# Patient Record
Sex: MALE | Race: WHITE | NOT HISPANIC OR LATINO | URBAN - METROPOLITAN AREA
[De-identification: names, ages, dates, MRNs, and addresses within clinical notes are randomized per-mention and may not be internally consistent; named-entity substitution may affect disease eponyms.]

---

## 2019-06-26 ENCOUNTER — EMERGENCY (EMERGENCY)
Facility: HOSPITAL | Age: 64
LOS: 1 days | Discharge: HOME | End: 2019-06-26
Attending: EMERGENCY MEDICINE | Admitting: EMERGENCY MEDICINE
Payer: COMMERCIAL

## 2019-06-26 PROCEDURE — 99284 EMERGENCY DEPT VISIT MOD MDM: CPT | Mod: 25

## 2019-06-27 VITALS
HEART RATE: 91 BPM | RESPIRATION RATE: 18 BRPM | SYSTOLIC BLOOD PRESSURE: 160 MMHG | OXYGEN SATURATION: 99 % | DIASTOLIC BLOOD PRESSURE: 91 MMHG | TEMPERATURE: 98 F

## 2019-06-27 VITALS
TEMPERATURE: 98 F | DIASTOLIC BLOOD PRESSURE: 70 MMHG | RESPIRATION RATE: 19 BRPM | SYSTOLIC BLOOD PRESSURE: 137 MMHG | OXYGEN SATURATION: 99 % | HEART RATE: 81 BPM

## 2019-06-27 LAB
ALBUMIN SERPL ELPH-MCNC: 4.1 G/DL — SIGNIFICANT CHANGE UP (ref 3.3–5)
ALP SERPL-CCNC: 58 U/L — SIGNIFICANT CHANGE UP (ref 40–120)
ALT FLD-CCNC: 14 U/L — SIGNIFICANT CHANGE UP (ref 4–41)
ANION GAP SERPL CALC-SCNC: 11 MMO/L — SIGNIFICANT CHANGE UP (ref 7–14)
AST SERPL-CCNC: 22 U/L — SIGNIFICANT CHANGE UP (ref 4–40)
BASOPHILS # BLD AUTO: 0.03 K/UL — SIGNIFICANT CHANGE UP (ref 0–0.2)
BASOPHILS NFR BLD AUTO: 0.4 % — SIGNIFICANT CHANGE UP (ref 0–2)
BILIRUB DIRECT SERPL-MCNC: < 0.2 MG/DL — SIGNIFICANT CHANGE UP (ref 0.1–0.2)
BILIRUB SERPL-MCNC: 0.4 MG/DL — SIGNIFICANT CHANGE UP (ref 0.2–1.2)
BUN SERPL-MCNC: 15 MG/DL — SIGNIFICANT CHANGE UP (ref 7–23)
CALCIUM SERPL-MCNC: 9.4 MG/DL — SIGNIFICANT CHANGE UP (ref 8.4–10.5)
CHLORIDE SERPL-SCNC: 98 MMOL/L — SIGNIFICANT CHANGE UP (ref 98–107)
CO2 SERPL-SCNC: 28 MMOL/L — SIGNIFICANT CHANGE UP (ref 22–31)
CREAT SERPL-MCNC: 0.9 MG/DL — SIGNIFICANT CHANGE UP (ref 0.5–1.3)
CRP SERPL-MCNC: 11.5 MG/L — HIGH
EOSINOPHIL # BLD AUTO: 0.4 K/UL — SIGNIFICANT CHANGE UP (ref 0–0.5)
EOSINOPHIL NFR BLD AUTO: 4.8 % — SIGNIFICANT CHANGE UP (ref 0–6)
ERYTHROCYTE [SEDIMENTATION RATE] IN BLOOD: 23 MM/HR — HIGH (ref 1–15)
GLUCOSE SERPL-MCNC: 119 MG/DL — HIGH (ref 70–99)
GRAM STN SPEC: SIGNIFICANT CHANGE UP
HBA1C BLD-MCNC: 5.5 % — SIGNIFICANT CHANGE UP (ref 4–5.6)
HCT VFR BLD CALC: 42 % — SIGNIFICANT CHANGE UP (ref 39–50)
HGB BLD-MCNC: 13.6 G/DL — SIGNIFICANT CHANGE UP (ref 13–17)
IMM GRANULOCYTES NFR BLD AUTO: 0.4 % — SIGNIFICANT CHANGE UP (ref 0–1.5)
LYMPHOCYTES # BLD AUTO: 1.62 K/UL — SIGNIFICANT CHANGE UP (ref 1–3.3)
LYMPHOCYTES # BLD AUTO: 19.2 % — SIGNIFICANT CHANGE UP (ref 13–44)
MAGNESIUM SERPL-MCNC: 2.2 MG/DL — SIGNIFICANT CHANGE UP (ref 1.6–2.6)
MCHC RBC-ENTMCNC: 30.4 PG — SIGNIFICANT CHANGE UP (ref 27–34)
MCHC RBC-ENTMCNC: 32.4 % — SIGNIFICANT CHANGE UP (ref 32–36)
MCV RBC AUTO: 93.8 FL — SIGNIFICANT CHANGE UP (ref 80–100)
MONOCYTES # BLD AUTO: 0.74 K/UL — SIGNIFICANT CHANGE UP (ref 0–0.9)
MONOCYTES NFR BLD AUTO: 8.8 % — SIGNIFICANT CHANGE UP (ref 2–14)
NEUTROPHILS # BLD AUTO: 5.6 K/UL — SIGNIFICANT CHANGE UP (ref 1.8–7.4)
NEUTROPHILS NFR BLD AUTO: 66.4 % — SIGNIFICANT CHANGE UP (ref 43–77)
NRBC # FLD: 0 K/UL — SIGNIFICANT CHANGE UP (ref 0–0)
NT-PROBNP SERPL-SCNC: 82.25 PG/ML — SIGNIFICANT CHANGE UP
PHOSPHATE SERPL-MCNC: 3.1 MG/DL — SIGNIFICANT CHANGE UP (ref 2.5–4.5)
PLATELET # BLD AUTO: 192 K/UL — SIGNIFICANT CHANGE UP (ref 150–400)
PMV BLD: 10.4 FL — SIGNIFICANT CHANGE UP (ref 7–13)
POTASSIUM SERPL-MCNC: 3.9 MMOL/L — SIGNIFICANT CHANGE UP (ref 3.5–5.3)
POTASSIUM SERPL-SCNC: 3.9 MMOL/L — SIGNIFICANT CHANGE UP (ref 3.5–5.3)
PROT SERPL-MCNC: 7.5 G/DL — SIGNIFICANT CHANGE UP (ref 6–8.3)
RBC # BLD: 4.48 M/UL — SIGNIFICANT CHANGE UP (ref 4.2–5.8)
RBC # FLD: 12.4 % — SIGNIFICANT CHANGE UP (ref 10.3–14.5)
SODIUM SERPL-SCNC: 137 MMOL/L — SIGNIFICANT CHANGE UP (ref 135–145)
SPECIMEN SOURCE: SIGNIFICANT CHANGE UP
WBC # BLD: 8.42 K/UL — SIGNIFICANT CHANGE UP (ref 3.8–10.5)
WBC # FLD AUTO: 8.42 K/UL — SIGNIFICANT CHANGE UP (ref 3.8–10.5)

## 2019-06-27 PROCEDURE — 73630 X-RAY EXAM OF FOOT: CPT | Mod: 26,LT

## 2019-06-27 PROCEDURE — 71045 X-RAY EXAM CHEST 1 VIEW: CPT | Mod: 26

## 2019-06-27 PROCEDURE — 93971 EXTREMITY STUDY: CPT | Mod: 26,LT

## 2019-06-27 RX ORDER — MUPIROCIN 20 MG/G
1 OINTMENT TOPICAL ONCE
Refills: 0 | Status: COMPLETED | OUTPATIENT
Start: 2019-06-27 | End: 2019-06-27

## 2019-06-27 RX ADMIN — Medication 100 MILLIGRAM(S): at 03:12

## 2019-06-27 RX ADMIN — MUPIROCIN 1 APPLICATION(S): 20 OINTMENT TOPICAL at 11:00

## 2019-06-27 NOTE — ED PROVIDER NOTE - PROGRESS NOTE DETAILS
Elbert Cobb MD, PGY3: Patient received at resident sign out. Discussed with podiatry, will discharge with follow up in 1 week. Pt ambulating, appears well. Patient informed of ED visit findings, understands plan.  Patient provided with written and further verbal instructions not included in discharge paperwork.  Patient instructed to follow up with their primary care physician in 2-3 days and return for new, worsened, or persistent symptoms.

## 2019-06-27 NOTE — CONSULT NOTE ADULT - SUBJECTIVE AND OBJECTIVE BOX
Podiatry pager #: [phone] ([place])/ [phone] ([facility])    Patient is a 64y old  Male who presents with a chief complaint of L foot ulcer w/ LLE swelling.    HPI: 63yo M who has not seen a doctor >20 years presents with L leg pain and swelling. 2 weeks ago pt filed down a callous on the bottom of his L foot. Ex-girlfriend Adrianne at bedside. An ulcer developed and has gotten larger. He tried peroxide and salt water on the wound. His LLE became red, swollen, and warm. He has chronic b/l LE edema especially after he stands all day as a , but the L leg is more swollen. Denies discharge from the ulcer. He has a chronic cough with white phlegm for years. At baseline he does not exert himself much and feels short of breath easily. Sleeps with 2 pillows. Wakes up in the middle of the night coughing sometimes. Denies F/C, CP, wheezing, N/V/D, abd pain, PND, orthopnea, dysuria, numbness/tingling, urinary frequency, polydipsia.      PAST MEDICAL & SURGICAL HISTORY:  No pertinent past medical history  No significant past surgical history      MEDICATIONS  (STANDING):    MEDICATIONS  (PRN):      Allergies    No Known Allergies    Intolerances        VITALS:    Vital Signs Last 24 Hrs  T(C): 36.7 (27 Jun 2019 07:51), Max: 36.7 (27 Jun 2019 00:01)  T(F): 98 (27 Jun 2019 07:51), Max: 98.1 (27 Jun 2019 00:01)  HR: 81 (27 Jun 2019 07:51) (65 - 91)  BP: 137/70 (27 Jun 2019 07:51) (137/70 - 162/94)  BP(mean): --  RR: 19 (27 Jun 2019 07:51) (18 - 19)  SpO2: 99% (27 Jun 2019 07:51) (98% - 99%)    LABS:                          13.6   8.42  )-----------( 192      ( 27 Jun 2019 02:40 )             42.0       06-27    137  |  98  |  15  ----------------------------<  119<H>  3.9   |  28  |  0.90    Ca    9.4      27 Jun 2019 02:40  Phos  3.1     06-27  Mg     2.2     06-27    TPro  7.5  /  Alb  4.1  /  TBili  0.4  /  DBili  < 0.2  /  AST  22  /  ALT  14  /  AlkPhos  58  06-27      CAPILLARY BLOOD GLUCOSE      POCT Blood Glucose.: 147 mg/dL (27 Jun 2019 00:00)          LOWER EXTREMITY PHYSICAL EXAM:    Vascular: DP/PT 2/4 B/L, CFT <3 seconds B/L, Temperature gradient warm to cool B/L  Neuro: Epicritic sensation diminished to the level of toes B/L  Musculoskeletal/Ortho: cavovarus foot type b/l  Skin: LLE +3 pitting edema w/ venous stasis and varicosities b/l  Wound #1:   Location: left foot sub met 5  Size: 2x2cm in diameter  Depth: probes to subQ  Wound bed: fibrogranular  Drainage: none  Odor: none  Periwound: hyperkeratotic  Etiology: pressure, neuropathy    RADIOLOGY & ADDITIONAL STUDIES:  < from: Xray Foot AP + Lateral + Oblique, Left (06.27.19 @ 04:58) >  EXAM:  RAD FOOT MIN 3 VIEWS LT        PROCEDURE DATE:  Jun 27 2019         INTERPRETATION:  CLINICAL INFORMATION: Left foot ulceration, evaluate for   acute osteomyelitis.    TECHNIQUE: 3 views of the left foot.    COMPARISON: None.    FINDINGS:   Diffuse lower extremity subcutaneous edema.  No evidence of subcutaneous tracking gas, cortical destruction or   erosion. No radiopaque foreign body.  Chronic appearing deformity of the second digit metatarsal head.  No acute fracture or dislocation.    IMPRESSION:   No radiologic evidence of acute osteomyelitis.              RACHEL MALLOY M.D., RADIOLOGY RESIDENT  This document has been electronically signed.  MANI PARSONS M.D., ATTENDING RADIOLOGIST  This document has been electronically signed. Jun27 2019  6:16AM                  < end of copied text >

## 2019-06-27 NOTE — ED PROVIDER NOTE - NSFOLLOWUPINSTRUCTIONS_ED_ALL_ED_FT
1. TAKE CLINDAMYCIN (ANTIBIOTIC) AS PRESCRIBED  2. FOR PAIN OR FEVER YOU CAN TAKE IBUPROFEN (MOTRIN, ADVIL) OR ACETAMINOPHEN (TYLENOL) AS NEEDED, AS DIRECTED ON PACKAGING.  3. FOLLOW UP WITH A PODIATRIST IN 5-7 DAYS. CALL OFFICE FOR APPOINTMENT.  4. IF YOU HAD LABS OR IMAGING DONE, YOU WERE GIVEN COPIES OF ALL LABS AND/OR IMAGING RESULTS FROM YOUR ER VISIT--PLEASE TAKE THEM WITH YOU TO YOUR FOLLOW UP APPOINTMENTS.  5. IF NEEDED, CALL PATIENT ACCESS SERVICES AT [phone] TO FIND A PRIMARY CARE PHYSICIAN.  OR CALL [phone] TO MAKE AN APPOINTMENT WITH THE CLINIC.  6. RETURN TO THE ER FOR ANY WORSENING SYMPTOMS OR CONCERNS.  7. PLEASE ESTABLISH CARE WITH A PRIMARY CARE DOCTOR FOR HEALTH MAINTENANCE AND ROUTINE HEALTHCARE. 1. TAKE CLINDAMYCIN (ANTIBIOTIC) AS PRESCRIBED TWO PILLS EVERY 8 HOURS FOR 10 DAYS  2. FOR PAIN OR FEVER YOU CAN TAKE IBUPROFEN (MOTRIN, ADVIL) OR ACETAMINOPHEN (TYLENOL) AS NEEDED, AS DIRECTED ON PACKAGING.  3. FOLLOW UP WITH A PODIATRIST IN 5-7 DAYS. CALL OFFICE FOR APPOINTMENT.  -Follow up with Dr. Richards within 1 week. Call [phone] to make an appointment.  4. IF YOU HAD LABS OR IMAGING DONE, YOU WERE GIVEN COPIES OF ALL LABS AND/OR IMAGING RESULTS FROM YOUR ER VISIT--PLEASE TAKE THEM WITH YOU TO YOUR FOLLOW UP APPOINTMENTS.  5. IF NEEDED, CALL PATIENT ACCESS SERVICES AT [phone] TO FIND A PRIMARY CARE PHYSICIAN.  OR CALL [phone] TO MAKE AN APPOINTMENT WITH THE CLINIC.  6. RETURN TO THE ER FOR ANY WORSENING SYMPTOMS OR CONCERNS.  7. PLEASE ESTABLISH CARE WITH A PRIMARY CARE DOCTOR FOR HEALTH MAINTENANCE AND ROUTINE HEALTHCARE.

## 2019-06-27 NOTE — ED ADULT NURSE REASSESSMENT NOTE - NS ED NURSE REASSESS COMMENT FT1
Received report from overnight YANELI [place]. Pt AxOx4, presenting with positive ROM in upper and lower extremities. Breathing is even and unlabored, pt satting well on RA. NAD noted at this time. Pt denies CP, SOB, dizziness, lightheadedness, H/A at this time. Pt waiting podiatry consult. Wound on bottom of left foot measuring 3x2, stage 2. MD at bedside, S, will continue to monitor.

## 2019-06-27 NOTE — ED ADULT NURSE REASSESSMENT NOTE - NS ED NURSE REASSESS COMMENT FT1
Pt requesting to speak with doctor to discuss results, MD made aware, MD at bedside, will continue to monitor.

## 2019-06-27 NOTE — ED ADULT NURSE NOTE - NSIMPLEMENTINTERV_GEN_ALL_ED
Implemented All Universal Safety Interventions:  [place] to call system. Call bell, personal items and telephone within reach. Instruct patient to call for assistance. Room bathroom lighting operational. Non-slip footwear when patient is off stretcher. Physically safe environment: no spills, clutter or unnecessary equipment. Stretcher in lowest position, wheels locked, appropriate side rails in place.

## 2019-06-27 NOTE — ED PROVIDER NOTE - CCCP TRG CHIEF CMPLNT
Veronica from [facility] calling and patient has a sentencing at 3:20 today.    He was been rude, combative with staff and likely off his medications and noncompliant.     He will likely be sentenced to [facility] today.    They will be contacting our office to notify us if new scripts need to be sent to a new pharmacy if he has to go to [facility].    Veronica's supervisor is Dante and his phone number is [phone]       foot/leg pain/swelling

## 2019-06-27 NOTE — ED ADULT TRIAGE NOTE - CHIEF COMPLAINT QUOTE
c/o pain and swelling to left lower leg. has wound on bottom of foot that started as an infection from sanding down a callus. +1 pitting edema to left leg. has not seen a doctor in 20 years.

## 2019-06-27 NOTE — ED PROVIDER NOTE - ATTENDING CONTRIBUTION TO CARE
Afebrile. Awake and Alert. Lungs CTA. Heart RRR. CN II-XII grossly intact. Moves all extremities without lateralization. b/l LE +2 pitting edema. LLE ulcer to heal of foot with 2.5 cm with surrounding, +2 pedal pulse.    Cellulitis with b/l pedal edema, no medical care  r/o OM (low suspicion) XR, ESR, CRP, blood Cx  r/o CHF given leg swelling BNP/CXR  No sepsis Afebrile. Awake and Alert. Lungs CTA. Heart RRR. CN II-XII grossly intact. Moves all extremities without lateralization. b/l LE +2 pitting edema. LLE ulcer stage 2 to ball of foot with 2.5 cm with surrounding erythema up to mid shin, +2 pedal pulse.    Cellulitis with b/l pedal edema, no medical care  r/o OM (low suspicion) XR, ESR, CRP, blood Cx  r/o CHF given leg swelling BNP/CXR  No sepsis Afebrile. Awake and Alert. Lungs CTA. Heart RRR. CN II-XII grossly intact. Moves all extremities without lateralization. b/l LE l>r. LLE ulcer stage 2 to ball of foot with 2.5 cm with surrounding erythema up to mid shin, +2 pedal pulse.    Cellulitis with b/l pedal edema, no medical care  r/o OM (low suspicion) XR, ESR, CRP, blood Cx  r/o CHF given leg swelling BNP/CXR  No sepsis  Will check HA1C r/o DM    Venous stasis ulcer. Pt declined CDU for IV abx. Podiatry consulted.

## 2019-06-27 NOTE — ED ADULT NURSE NOTE - OBJECTIVE STATEMENT
Pt received to the ED came in c/o of callous located on bottom of left foot that "broke open". Pt b/l legs edematous at baseline, +1 edema to rt lower leg. Pt left lower leg appears swollen and red. Pt a&ox4 and ambulatory at baseline, respirations even and unlabored. Pt denies fever, chills, or any other symptoms. Awaiting MD orders, will continue to monitor.

## 2019-06-27 NOTE — ED PROVIDER NOTE - OBJECTIVE STATEMENT
63yo M who has not seen a doctor >20 years presents with L leg pain and swelling. 63yo M who has not seen a doctor >20 years presents with L leg pain and swelling. 2 weeks ago pt filed down a callous on the bottom of his L foot. Ex-girlfriend Adrianne at bedside. An ulcer developed and has gotten larger. He tried peroxide and salt water on the wound. His LLE became red, swollen, and warm. He has chronic b/l LE edema especially after he stands all day as a , but the L leg is more swollen. Denies discharge from the ulcer. He has a chronic cough with white phlegm for years. At baseline he does not exert himself much and feels short of breath easily. Sleeps with 2 pillows. Wakes up in the middle of the night coughing sometimes. Denies F/C, CP, wheezing, N/V/D, abd pain, PND, orthopnea, dysuria, numbness/tingling, urinary frequency, polydipsia.

## 2019-06-27 NOTE — ED PROVIDER NOTE - PHYSICAL EXAMINATION
Vital Signs Last 24 Hrs  T(C): 36.6 (27 Jun 2019 01:23), Max: 36.7 (27 Jun 2019 00:01)  T(F): 97.8 (27 Jun 2019 01:23), Max: 98.1 (27 Jun 2019 00:01)  HR: 65 (27 Jun 2019 02:43) (65 - 91)  BP: 142/78 (27 Jun 2019 02:43) (142/78 - 162/94)  BP(mean): --  RR: 18 (27 Jun 2019 02:43) (18 - 18)  SpO2: 98% (27 Jun 2019 02:43) (98% - 99%)    GENERAL: No acute distress, well-developed  HEAD:  Atraumatic, Normocephalic  ENT: PERRL, conjunctiva and sclera clear, neck supple, no JVD, moist mucosa, posterior oropharynx clear  CHEST/LUNG: crackles at bases; No wheeze, equal breath sounds bilaterally, respirations nonlabored, on RA  HEART: Regular rate and rhythm; No murmurs, rubs, or gallops  ABDOMEN: Soft, nontender, nondistended; Bowel sounds present, no organomegaly  BACK: no spinal tenderness, no CVA tenderness  EXTREMITIES:  No clubbing, cyanosis. 3+ pitting edema LLE, 2+ pitting edema RLE with varicose veins. L leg erythematous with inc warmth, no sharp demarcation or streaking. Pedal pulses intact  PSYCH: Nl behavior, nl affect  NEUROLOGY: AAOx3, non-focal, moves all extremities spontaneously  SKIN: Normal color, No rashes. L plantar forefoot clean-based ulcer ~2.5cm diameter, no discharge or foul-smelling.

## 2019-06-27 NOTE — CONSULT NOTE ADULT - ASSESSMENT
65 y/o M w/ LF sub met 5 ulcer to subQ and LLE edema  -Pt seen and evaluated in ED  -Vitals stable, no WBC  -Ulcer probes to subQ, stable, no acute signs of infection  -LLE edema likely 2/2 venous stasis and possibly infection from the ulcer  -X-ray negative for OM  -Wound culture taken  -Pt refusing stay in CDU- will follow up on culture and monitor swelling outpatient  -Dressed w/ betadine and DSD  -Rec 2 wks PO Clinda  -Daily dressing changes w/ mupirocin and DSD  -Follow up with Dr. Richards within 1 week. Call [phone] to make an appointment.  -Discussed w/ attending 65 y/o M w/ LF sub met 5 ulcer to subQ and LLE edema  -Pt seen and evaluated in ED  -Vitals stable, no WBC  -Ulcer probes to subQ, stable, no acute signs of infection  -LLE edema likely 2/2 venous stasis and possibly infection from the ulcer  -X-ray negative for OM  -Wound culture taken  -Pt refusing stay in CDU- will follow up on culture and monitor swelling outpatient  -Dressed w/ betadine, DSD, and ACE  -Rec 2 wks PO Clinda  -Daily dressing changes w/ mupirocin and DSD  -Rec compression of the LLE w/ ACE  -Follow up with Dr. Richards within 1 week. Call [phone] to make an appointment.  -Discussed w/ attending

## 2019-06-27 NOTE — ED PROVIDER NOTE - CARE PLAN
Principal Discharge DX:	Cellulitis of left lower extremity Principal Discharge DX:	Venous stasis ulcer  Secondary Diagnosis:	Cellulitis of left lower extremity

## 2019-06-27 NOTE — ED PROVIDER NOTE - CLINICAL SUMMARY MEDICAL DECISION MAKING FREE TEXT BOX
Pt nontoxic, afebrile without leukocytosis. LLE neg for DVT, XRay LLE neg for signs of OM. ESR 23, CRP 11.5. Clindamycin for LLE cellulitis. Podiatry consult. Pt nontoxic, afebrile without leukocytosis. LLE neg for DVT, XRay LLE neg for signs of OM. ESR 23, CRP 11.5. Clindamycin for LLE cellulitis. Podiatry consult for LLE venous stasis ulcer.

## 2019-06-28 LAB
SPECIMEN SOURCE: SIGNIFICANT CHANGE UP
SPECIMEN SOURCE: SIGNIFICANT CHANGE UP

## 2019-06-29 LAB
GRAM STN SPEC: SIGNIFICANT CHANGE UP
METHOD TYPE: SIGNIFICANT CHANGE UP
ORGANISM # SPEC MICROSCOPIC CNT: SIGNIFICANT CHANGE UP

## 2019-07-01 LAB
-  AMPICILLIN: SIGNIFICANT CHANGE UP
-  CIPROFLOXACIN: SIGNIFICANT CHANGE UP
-  TETRACYCLINE: SIGNIFICANT CHANGE UP
-  VANCOMYCIN: SIGNIFICANT CHANGE UP
CULTURE RESULTS: SIGNIFICANT CHANGE UP
ORGANISM # SPEC MICROSCOPIC CNT: SIGNIFICANT CHANGE UP
ORGANISM # SPEC MICROSCOPIC CNT: SIGNIFICANT CHANGE UP

## 2019-07-02 LAB
BACTERIA BLD CULT: SIGNIFICANT CHANGE UP
BACTERIA BLD CULT: SIGNIFICANT CHANGE UP

## 2019-07-03 RX ORDER — MOXIFLOXACIN HYDROCHLORIDE TABLETS, 400 MG 400 MG/1
1 TABLET, FILM COATED ORAL
Qty: 14 | Refills: 0
Start: 2019-07-03 | End: 2019-07-09

## 2019-09-19 ENCOUNTER — INPATIENT (INPATIENT)
Facility: HOSPITAL | Age: 64
LOS: 4 days | Discharge: HOME | End: 2019-09-24
Attending: INTERNAL MEDICINE | Admitting: INTERNAL MEDICINE
Payer: COMMERCIAL

## 2019-09-19 VITALS
HEART RATE: 84 BPM | RESPIRATION RATE: 16 BRPM | DIASTOLIC BLOOD PRESSURE: 75 MMHG | TEMPERATURE: 98 F | OXYGEN SATURATION: 100 % | SYSTOLIC BLOOD PRESSURE: 156 MMHG

## 2019-09-19 LAB
ALBUMIN SERPL ELPH-MCNC: 4.3 G/DL — SIGNIFICANT CHANGE UP (ref 3.3–5)
ALP SERPL-CCNC: 62 U/L — SIGNIFICANT CHANGE UP (ref 40–120)
ALT FLD-CCNC: 15 U/L — SIGNIFICANT CHANGE UP (ref 4–41)
ANION GAP SERPL CALC-SCNC: 11 MMO/L — SIGNIFICANT CHANGE UP (ref 7–14)
AST SERPL-CCNC: 19 U/L — SIGNIFICANT CHANGE UP (ref 4–40)
BASOPHILS # BLD AUTO: 0.05 K/UL — SIGNIFICANT CHANGE UP (ref 0–0.2)
BASOPHILS NFR BLD AUTO: 0.6 % — SIGNIFICANT CHANGE UP (ref 0–2)
BILIRUB SERPL-MCNC: 0.6 MG/DL — SIGNIFICANT CHANGE UP (ref 0.2–1.2)
BUN SERPL-MCNC: 14 MG/DL — SIGNIFICANT CHANGE UP (ref 7–23)
CALCIUM SERPL-MCNC: 9.3 MG/DL — SIGNIFICANT CHANGE UP (ref 8.4–10.5)
CHLORIDE SERPL-SCNC: 97 MMOL/L — LOW (ref 98–107)
CO2 SERPL-SCNC: 31 MMOL/L — SIGNIFICANT CHANGE UP (ref 22–31)
CREAT SERPL-MCNC: 0.89 MG/DL — SIGNIFICANT CHANGE UP (ref 0.5–1.3)
CRP SERPL-MCNC: 10.2 MG/L — HIGH
EOSINOPHIL # BLD AUTO: 0.27 K/UL — SIGNIFICANT CHANGE UP (ref 0–0.5)
EOSINOPHIL NFR BLD AUTO: 3.1 % — SIGNIFICANT CHANGE UP (ref 0–6)
ERYTHROCYTE [SEDIMENTATION RATE] IN BLOOD: 18 MM/HR — HIGH (ref 1–15)
GLUCOSE SERPL-MCNC: 107 MG/DL — HIGH (ref 70–99)
HCT VFR BLD CALC: 42.6 % — SIGNIFICANT CHANGE UP (ref 39–50)
HGB BLD-MCNC: 13.6 G/DL — SIGNIFICANT CHANGE UP (ref 13–17)
IMM GRANULOCYTES NFR BLD AUTO: 0.6 % — SIGNIFICANT CHANGE UP (ref 0–1.5)
LYMPHOCYTES # BLD AUTO: 2.04 K/UL — SIGNIFICANT CHANGE UP (ref 1–3.3)
LYMPHOCYTES # BLD AUTO: 23.6 % — SIGNIFICANT CHANGE UP (ref 13–44)
MCHC RBC-ENTMCNC: 29.5 PG — SIGNIFICANT CHANGE UP (ref 27–34)
MCHC RBC-ENTMCNC: 31.9 % — LOW (ref 32–36)
MCV RBC AUTO: 92.4 FL — SIGNIFICANT CHANGE UP (ref 80–100)
MONOCYTES # BLD AUTO: 0.63 K/UL — SIGNIFICANT CHANGE UP (ref 0–0.9)
MONOCYTES NFR BLD AUTO: 7.3 % — SIGNIFICANT CHANGE UP (ref 2–14)
NEUTROPHILS # BLD AUTO: 5.6 K/UL — SIGNIFICANT CHANGE UP (ref 1.8–7.4)
NEUTROPHILS NFR BLD AUTO: 64.8 % — SIGNIFICANT CHANGE UP (ref 43–77)
NRBC # FLD: 0 K/UL — SIGNIFICANT CHANGE UP (ref 0–0)
PLATELET # BLD AUTO: 209 K/UL — SIGNIFICANT CHANGE UP (ref 150–400)
PMV BLD: 10.3 FL — SIGNIFICANT CHANGE UP (ref 7–13)
POTASSIUM SERPL-MCNC: 4.2 MMOL/L — SIGNIFICANT CHANGE UP (ref 3.5–5.3)
POTASSIUM SERPL-SCNC: 4.2 MMOL/L — SIGNIFICANT CHANGE UP (ref 3.5–5.3)
PROT SERPL-MCNC: 8.1 G/DL — SIGNIFICANT CHANGE UP (ref 6–8.3)
RBC # BLD: 4.61 M/UL — SIGNIFICANT CHANGE UP (ref 4.2–5.8)
RBC # FLD: 12.4 % — SIGNIFICANT CHANGE UP (ref 10.3–14.5)
SODIUM SERPL-SCNC: 139 MMOL/L — SIGNIFICANT CHANGE UP (ref 135–145)
WBC # BLD: 8.64 K/UL — SIGNIFICANT CHANGE UP (ref 3.8–10.5)
WBC # FLD AUTO: 8.64 K/UL — SIGNIFICANT CHANGE UP (ref 3.8–10.5)

## 2019-09-19 PROCEDURE — 73620 X-RAY EXAM OF FOOT: CPT | Mod: 26,LT

## 2019-09-19 NOTE — ED PROVIDER NOTE - CLINICAL SUMMARY MEDICAL DECISION MAKING FREE TEXT BOX
Attending MD Nelson.  Given age of wound, location, chronic non-healing pressure ulcer with surrounding edema, concern for osteomyelitis.  No current evidence of sepsis or significant surrounding cellulitis.

## 2019-09-19 NOTE — ED ADULT NURSE NOTE - OBJECTIVE STATEMENT
received pt to intake aox4 in no apparent distress VSS sent by podiatrist for wound on L foot. Pt sustained wound to bottom of foot few months ago when filing calculous. Has been slow healing since largest since quarter size now dime size with clean edges and pink wound bed. Pt states was sent for abx because surround area has been intermittently swollen with redness, currently note some edema and redness to middle toe. Pt endorses difficulty ambulating. Denies any other symptoms including fevers, chills, n/v, chest pain, SOB, abdominal pain, weakness. Breaths equal and unlabored VSS 18 G IV L AC placed labs sent will continue to monitor

## 2019-09-19 NOTE — ED PROVIDER NOTE - PROGRESS NOTE DETAILS
Attending MD Nelson.  Labs, vital signs and initial XR largely non-actionable.  Podiatry has seen pt and recommends vanc/cefepime and MRI with contrast.  Pt is well appearing in NAD.  Unclear if chronic non-healing ulcer represents isolated skin involvement or underlying osteomyelitis.  Will consult CDU for possible obs stay and MRI.  PT advised and amenable to plan.  Podiatry to follow.  If MRI inconsistent with osteo will change abxs to skin carlos and likely d/c tomorrow with close podiatry follow-up.

## 2019-09-19 NOTE — ED ADULT NURSE NOTE - NSIMPLEMENTINTERV_GEN_ALL_ED
Implemented All Fall Risk Interventions:  [place] to call system. Call bell, personal items and telephone within reach. Instruct patient to call for assistance. Room bathroom lighting operational. Non-slip footwear when patient is off stretcher. Physically safe environment: no spills, clutter or unnecessary equipment. Stretcher in lowest position, wheels locked, appropriate side rails in place. Provide visual cue, wrist band, yellow gown, etc. Monitor gait and stability. Monitor for mental status changes and reorient to person, place, and time. Review medications for side effects contributing to fall risk. Reinforce activity limits and safety measures with patient and family.

## 2019-09-19 NOTE — ED PROVIDER NOTE - NS ED ROS FT
Gen: No fever, normal appetite  Eyes: No eye irritation or discharge  ENT: No ear pain, congestion, sore throat  Resp: No cough or trouble breathing  Cardiovascular: No chest pain or palpitation  Gastroenteric: No nausea/vomiting, diarrhea, constipation  :  No change in urine output; no dysuria  MS: pain in L foot with prolonged standing  Skin: No rashes. Skin breakdown over L ball of foot.   Neuro: No headache; no abnormal movements  Remainder negative, except as per the HPI

## 2019-09-19 NOTE — ED PROVIDER NOTE - ATTENDING CONTRIBUTION TO CARE
Agree with above.  Pt is well appearing, L foot is edematous and has a deep wound to ball of foot c/w chronic non-healing ulcer.  Tender to deep palpation.  Dorsal pedal pulses intact.  Pt denies leg pain/CP/SOB.  States he was sent in by Dr. Erwin Rangel.  States he was 'supposed to stay' in July but refused.  Planned eval with labs/XR and podiatry consult.

## 2019-09-19 NOTE — ED PROVIDER NOTE - PHYSICAL EXAMINATION
Const:  Alert and interactive, no acute distress  HEENT: Normocephalic, atraumatic; Moist mucosa; Oropharynx clear; Neck supple  Lymph: No significant lymphadenopathy  CV: Heart regular, normal S1/2, no murmurs; Extremities WWPx4  Pulm: Lungs clear to auscultation bilaterally  GI: Abdomen non-distended; No organomegaly, no tenderness, no masses  Skin: No rash noted, L wound at ball of foot. No pus, foul odor.  Pink healthy skin at wound base. White calloused skin around wound. DP pulses palpable bilaterally. PT pulse palpable on R, not palpable on L due to swelling inferior and posterior to medial malleolus due to L ankle injury while playing tennis as a kid.   Neuro: Alert; Normal tone; coordination intact Const:  Alert and interactive, no acute distress  HEENT: Normocephalic, atraumatic; Moist mucosa; Oropharynx clear; Neck supple  Lymph: No significant lymphadenopathy  CV: Heart regular, normal S1/2, no murmurs; Extremities WWPx4  Pulm: Lungs clear to auscultation bilaterally  GI: Abdomen non-distended; No organomegaly, no tenderness, no masses  Skin: No rash noted, L wound at ball of foot. No pus, foul odor.  Pink healthy skin at wound base. White calloused skin around wound. DP pulses palpable bilaterally. PT pulse palpable on R, not palpable on L secondary to swelling inferior and posterior to medial malleolus due to L ankle injury while playing tennis as a kid.   Neuro: Alert; Normal tone; coordination intact

## 2019-09-19 NOTE — ED PROVIDER NOTE - OBJECTIVE STATEMENT
Javier is a 64yoM with no PMH presenting with a chronic foot wound, sent in by podiatry for r/o osteomyelitis and IV abx as swelling has worsened in the last few days. Wound caused by filing callouses in July. Broke skin with metal file, bandaged it.  Wound did not heal well and at its largest was the size of a quarter. Today the size of a dime. S/p 2 PO antibiotic regimens (amoxicillin, then cipro). Took all abx as prescribed. Outpatient podiatry able to probe "almost to bone" today. No fever, chills, or night sweats. Swelling of the foot and ankle, increased in past few days. Pain with prolonged standing. Works at [employer] standing all day, 12h at a time. Concern for swelling and thickening of the skin lateral to the third toe on L foot.     All: none  Meds: Vitamin E, Collagen  Surg: none  PMH: none  PMD: Dr. Umana  Podiatrist: Dr. You, on janis ahumada

## 2019-09-19 NOTE — ED ADULT TRIAGE NOTE - CHIEF COMPLAINT QUOTE
C/o LLE swelling for few months, has been worse past couple of days, spoke to podiatrist 2 days ago who said if swelling does not improve he should come to ED to r/o osteomyelitis and for IV abx. Pt is ambulatory to triage, has gauze in place over wound. Denies fevers/ other complaints. Denies DM/ other PMH.

## 2019-09-20 LAB
ANION GAP SERPL CALC-SCNC: 13 MMO/L — SIGNIFICANT CHANGE UP (ref 7–14)
BASOPHILS # BLD AUTO: 0.04 K/UL — SIGNIFICANT CHANGE UP (ref 0–0.2)
BASOPHILS NFR BLD AUTO: 0.5 % — SIGNIFICANT CHANGE UP (ref 0–2)
BUN SERPL-MCNC: 15 MG/DL — SIGNIFICANT CHANGE UP (ref 7–23)
CALCIUM SERPL-MCNC: 9 MG/DL — SIGNIFICANT CHANGE UP (ref 8.4–10.5)
CHLORIDE SERPL-SCNC: 101 MMOL/L — SIGNIFICANT CHANGE UP (ref 98–107)
CO2 SERPL-SCNC: 26 MMOL/L — SIGNIFICANT CHANGE UP (ref 22–31)
CREAT SERPL-MCNC: 0.83 MG/DL — SIGNIFICANT CHANGE UP (ref 0.5–1.3)
EOSINOPHIL # BLD AUTO: 0.24 K/UL — SIGNIFICANT CHANGE UP (ref 0–0.5)
EOSINOPHIL NFR BLD AUTO: 3.1 % — SIGNIFICANT CHANGE UP (ref 0–6)
GLUCOSE SERPL-MCNC: 95 MG/DL — SIGNIFICANT CHANGE UP (ref 70–99)
GRAM STN SPEC: SIGNIFICANT CHANGE UP
HBA1C BLD-MCNC: 5.3 % — SIGNIFICANT CHANGE UP (ref 4–5.6)
HCT VFR BLD CALC: 41.3 % — SIGNIFICANT CHANGE UP (ref 39–50)
HGB BLD-MCNC: 13.3 G/DL — SIGNIFICANT CHANGE UP (ref 13–17)
IMM GRANULOCYTES NFR BLD AUTO: 0.4 % — SIGNIFICANT CHANGE UP (ref 0–1.5)
LYMPHOCYTES # BLD AUTO: 2.02 K/UL — SIGNIFICANT CHANGE UP (ref 1–3.3)
LYMPHOCYTES # BLD AUTO: 25.9 % — SIGNIFICANT CHANGE UP (ref 13–44)
MCHC RBC-ENTMCNC: 29.9 PG — SIGNIFICANT CHANGE UP (ref 27–34)
MCHC RBC-ENTMCNC: 32.2 % — SIGNIFICANT CHANGE UP (ref 32–36)
MCV RBC AUTO: 92.8 FL — SIGNIFICANT CHANGE UP (ref 80–100)
MONOCYTES # BLD AUTO: 0.71 K/UL — SIGNIFICANT CHANGE UP (ref 0–0.9)
MONOCYTES NFR BLD AUTO: 9.1 % — SIGNIFICANT CHANGE UP (ref 2–14)
NEUTROPHILS # BLD AUTO: 4.76 K/UL — SIGNIFICANT CHANGE UP (ref 1.8–7.4)
NEUTROPHILS NFR BLD AUTO: 61 % — SIGNIFICANT CHANGE UP (ref 43–77)
NRBC # FLD: 0 K/UL — SIGNIFICANT CHANGE UP (ref 0–0)
PLATELET # BLD AUTO: 169 K/UL — SIGNIFICANT CHANGE UP (ref 150–400)
PMV BLD: 10.9 FL — SIGNIFICANT CHANGE UP (ref 7–13)
POTASSIUM SERPL-MCNC: 4.6 MMOL/L — SIGNIFICANT CHANGE UP (ref 3.5–5.3)
POTASSIUM SERPL-SCNC: 4.6 MMOL/L — SIGNIFICANT CHANGE UP (ref 3.5–5.3)
RBC # BLD: 4.45 M/UL — SIGNIFICANT CHANGE UP (ref 4.2–5.8)
RBC # FLD: 12.5 % — SIGNIFICANT CHANGE UP (ref 10.3–14.5)
SODIUM SERPL-SCNC: 140 MMOL/L — SIGNIFICANT CHANGE UP (ref 135–145)
SPECIMEN SOURCE: SIGNIFICANT CHANGE UP
WBC # BLD: 7.8 K/UL — SIGNIFICANT CHANGE UP (ref 3.8–10.5)
WBC # FLD AUTO: 7.8 K/UL — SIGNIFICANT CHANGE UP (ref 3.8–10.5)

## 2019-09-20 PROCEDURE — 73719 MRI LOWER EXTREMITY W/DYE: CPT | Mod: 26,LT

## 2019-09-20 RX ORDER — CEFEPIME 1 G/1
1000 INJECTION, POWDER, FOR SOLUTION INTRAMUSCULAR; INTRAVENOUS ONCE
Refills: 0 | Status: COMPLETED | OUTPATIENT
Start: 2019-09-20 | End: 2019-09-20

## 2019-09-20 RX ORDER — CEFEPIME 1 G/1
1000 INJECTION, POWDER, FOR SOLUTION INTRAMUSCULAR; INTRAVENOUS EVERY 8 HOURS
Refills: 0 | Status: DISCONTINUED | OUTPATIENT
Start: 2019-09-20 | End: 2019-09-24

## 2019-09-20 RX ORDER — IBUPROFEN 200 MG
600 TABLET ORAL EVERY 6 HOURS
Refills: 0 | Status: DISCONTINUED | OUTPATIENT
Start: 2019-09-20 | End: 2019-09-24

## 2019-09-20 RX ORDER — VANCOMYCIN HCL 1 G
1000 VIAL (EA) INTRAVENOUS EVERY 12 HOURS
Refills: 0 | Status: DISCONTINUED | OUTPATIENT
Start: 2019-09-20 | End: 2019-09-24

## 2019-09-20 RX ORDER — VANCOMYCIN HCL 1 G
1000 VIAL (EA) INTRAVENOUS ONCE
Refills: 0 | Status: COMPLETED | OUTPATIENT
Start: 2019-09-20 | End: 2019-09-20

## 2019-09-20 RX ORDER — MUPIROCIN 20 MG/G
1 OINTMENT TOPICAL
Refills: 0 | Status: DISCONTINUED | OUTPATIENT
Start: 2019-09-20 | End: 2019-09-24

## 2019-09-20 RX ORDER — CEFEPIME 1 G/1
INJECTION, POWDER, FOR SOLUTION INTRAMUSCULAR; INTRAVENOUS
Refills: 0 | Status: DISCONTINUED | OUTPATIENT
Start: 2019-09-20 | End: 2019-09-24

## 2019-09-20 RX ADMIN — Medication 600 MILLIGRAM(S): at 18:09

## 2019-09-20 RX ADMIN — MUPIROCIN 1 APPLICATION(S): 20 OINTMENT TOPICAL at 07:55

## 2019-09-20 RX ADMIN — MUPIROCIN 1 APPLICATION(S): 20 OINTMENT TOPICAL at 18:07

## 2019-09-20 RX ADMIN — CEFEPIME 100 MILLIGRAM(S): 1 INJECTION, POWDER, FOR SOLUTION INTRAMUSCULAR; INTRAVENOUS at 18:07

## 2019-09-20 RX ADMIN — CEFEPIME 100 MILLIGRAM(S): 1 INJECTION, POWDER, FOR SOLUTION INTRAMUSCULAR; INTRAVENOUS at 02:39

## 2019-09-20 RX ADMIN — Medication 250 MILLIGRAM(S): at 13:08

## 2019-09-20 RX ADMIN — CEFEPIME 100 MILLIGRAM(S): 1 INJECTION, POWDER, FOR SOLUTION INTRAMUSCULAR; INTRAVENOUS at 10:39

## 2019-09-20 RX ADMIN — Medication 250 MILLIGRAM(S): at 01:36

## 2019-09-20 NOTE — ED CDU PROVIDER INITIAL DAY NOTE - INDICATION FOR OBSERVATION
Other/IV antibiotics per orders, MRI foot, IV antibiotics per orders, MRI foot, Podiatry following pt, general observation care / monitoring./Other

## 2019-09-20 NOTE — ED CDU PROVIDER INITIAL DAY NOTE - PROGRESS NOTE DETAILS
Awaiting MRI. Seen by Podiatry. Appears well. NAD. Afebrile. Vital signs unremarkable. JANNET Valentine: pt resting comfortably in bed NAD, family at bedside.  Pt returned from MRI awaiting results.  Will continue to monitor.

## 2019-09-20 NOTE — ED CDU PROVIDER INITIAL DAY NOTE - MUSCULOSKELETAL, MLM
Range of motion is not limited, no calf TTP LLE.  LE symmetrical in size/color/temp.  LLE with ~2 cm wound to plantar aspect of foot, centrally located at level of distal MTs; wound appears clean and with no active bleeding or discharge; surrounding tissue is not erythematous or edematous on objective visualization.  Pt. is obese, with large legs and feet bilaterally which appear grossly symmetrical.

## 2019-09-20 NOTE — CONSULT NOTE ADULT - ASSESSMENT
65 y/o M w/ LF sub met 3 ulcer to bone w/ cellulitic forefoot:  -pt seen and evaluated in ED  -vitals stable, no leukocytosis,   -LF X-ray negative for OM or subcutaneous gas   -left foot sub me 3 wound probes to bone centrally, fibrogranular base, no active drainage, forefoot erythematous and warm, no fluctuance or crepitation   -wound culture taken  -dressed w/ betadine, DSD, and ACE  -rec admit to CDU for IV abx and monitor progression of cellulitis  -ordered LF MR to r/o OM- high clinical suspicion   -rx: mupiricon  -will cont to follow to monitor progression on IV abx/ MR results   -Discussed w/ attending

## 2019-09-20 NOTE — CONSULT NOTE ADULT - SUBJECTIVE AND OBJECTIVE BOX
Attending:    Patient is a 64y old  Male who presents with a chief complaint of L foot wound/cellulitis     HPI:  Javier is a 64yoM with no PMH presenting with a chronic foot wound, sent in by podiatry for r/o osteomyelitis and IV abx as swelling has worsened in the last few days. Wound caused by filing callouses in July. Broke skin with metal file, bandaged it.  Wound did not heal well and at its largest was the size of a quarter. Today the size of a dime. S/p 2 PO antibiotic regimens (amoxicillin, then cipro). Took all abx as prescribed. Outpatient podiatry able to probe "almost to bone" today. No fever, chills, or night sweats. Swelling of the foot and ankle, increased in past few days. Pain with prolonged standing. Works at [employer] standing all day, 12h at a time. Concern for swelling and thickening of the skin lateral to the third toe on L foot.     Review of systems negative except per HPI    PAST MEDICAL & SURGICAL HISTORY:  No pertinent past medical history  No significant past surgical history    Home Medications:    Allergies    No Known Allergies    Intolerances      FAMILY HISTORY:  Family history of COPD (chronic obstructive pulmonary disease) (Mother): Mother    Social History:       LABS                        13.6   8.64  )-----------( 209      ( 19 Sep 2019 21:40 )             42.6     09-19    139  |  97<L>  |  14  ----------------------------<  107<H>  4.2   |  31  |  0.89    Ca    9.3      19 Sep 2019 21:40    TPro  8.1  /  Alb  4.3  /  TBili  0.6  /  DBili  x   /  AST  19  /  ALT  15  /  AlkPhos  62  09-19      ESR: 18  CRP: --  09-19 @ 21:40    Vital Signs Last 24 Hrs  T(C): 36.6 (19 Sep 2019 18:21), Max: 36.6 (19 Sep 2019 18:21)  T(F): 97.9 (19 Sep 2019 18:21), Max: 97.9 (19 Sep 2019 18:21)  HR: 84 (19 Sep 2019 18:21) (84 - 84)  BP: 156/75 (19 Sep 2019 18:21) (156/75 - 156/75)  BP(mean): --  RR: 16 (19 Sep 2019 18:21) (16 - 16)  SpO2: 100% (19 Sep 2019 18:21) (100% - 100%)    PHYSICAL EXAM  General: NAD, AA0x3    Lower Extremity Focused:  Vascular: DP/PT 2/4 B/L, CFT <3 seconds B/L, Temperature gradient warm to cool on R, warm to warm on L  Neuro: Epicritic sensation diminished to the level of toes B/L  Musculoskeletal/Ortho: cavovarus foot type b/l  Skin: LLE +3 pitting edema w/ venous stasis and varicosities b/l    Wound #1:   Location: left foot sub met 3  Size: 2x2cm in diameter  Depth: probes to bone  Wound bed: fibrogranular  Drainage: none  Odor: none  Periwound: hyperkeratotic  Etiology: pressure, neuropathy    	    RADIOLOGY    < from: Xray Foot AP + Lateral, Left (09.19.19 @ 22:14) >    ******PRELIMINARY REPORT******    ******PRELIMINARY REPORT******            EXAM:  RAD FOOT 2 VIEWS LEFT        PROCEDURE DATE:  Sep 19 2019     ******PRELIMINARY REPORT******    ******PRELIMINARY REPORT******            INTERPRETATION:  No subcutaneous gas collection, bony erosion or   periosteal reaction suggestive of advanced osteomyelitis. MRI is more   sensitive.            ******PRELIMINARY REPORT******    ******PRELIMINARY REPORT******          ALEX LOWE M.D., RADIOLOGY RESIDENT    < end of copied text >

## 2019-09-20 NOTE — ED CDU PROVIDER INITIAL DAY NOTE - CONSTITUTIONAL, MLM
normal... Pt obese, well appearing, well nourished, awake, alert, oriented to person, place, time/situation and in no apparent distress.  Pt. verbalizing in full, clear, effortless sentences.

## 2019-09-20 NOTE — ED CDU PROVIDER INITIAL DAY NOTE - OBJECTIVE STATEMENT
Javier is a 64yoM with no PMH presenting with a chronic foot wound, sent in by podiatry for r/o osteomyelitis and IV abx as swelling has worsened in the last few days. Wound caused by filing callouses in July. Broke skin with metal file, bandaged it.  Wound did not heal well and at its largest was the size of a quarter. Today the size of a dime. S/p 2 PO antibiotic regimens (amoxicillin, then cipro). Took all abx as prescribed. Outpatient podiatry able to probe "almost to bone" today. No fever, chills, or night sweats. Swelling of the foot and ankle, increased in past few days. Pain with prolonged standing. Works at [employer] standing all day, 12h at a time. Concern for swelling and thickening of the skin lateral to the third toe on L foot.   All: none  Meds: Vitamin E, Collagen  Surg: none  PMH: none  PMD: Dr. Umana  Podiatrist: Dr. You, on janis ahumada    CDU JANNET Bhat Note------  65 yo male, no stated PMH other than chronic plantar wound to left foot, presented to the ED for swelling to area of chronic plantar wound, as per above.  In the ED, Podiatry was consulted; pt was started on IV vancomycin and cefipime with MRI foot ordered to r/o osteomyelitis.  Pt. was dispo'd to CDU for continued care.  On CDU evaluation, pt with no active c/o; states any foot discomfort he has been experiencing has been helped by ibuprofen PRN.  No hx/o fevers or chills or foot trauma, though pt states he stands on his feet for extended time frames at work.  No calf pain/discomfort; no new/worse leg swelling; no other c/o.  CDU care plan d/w pt who verbalizes agreement with plan.

## 2019-09-21 DIAGNOSIS — L97.509 NON-PRESSURE CHRONIC ULCER OF OTHER PART OF UNSPECIFIED FOOT WITH UNSPECIFIED SEVERITY: ICD-10-CM

## 2019-09-21 RX ORDER — INFLUENZA VIRUS VACCINE 15; 15; 15; 15 UG/.5ML; UG/.5ML; UG/.5ML; UG/.5ML
0.5 SUSPENSION INTRAMUSCULAR ONCE
Refills: 0 | Status: COMPLETED | OUTPATIENT
Start: 2019-09-21 | End: 2019-09-21

## 2019-09-21 RX ORDER — ENOXAPARIN SODIUM 100 MG/ML
40 INJECTION SUBCUTANEOUS DAILY
Refills: 0 | Status: DISCONTINUED | OUTPATIENT
Start: 2019-09-21 | End: 2019-09-24

## 2019-09-21 RX ADMIN — MUPIROCIN 1 APPLICATION(S): 20 OINTMENT TOPICAL at 18:55

## 2019-09-21 RX ADMIN — CEFEPIME 100 MILLIGRAM(S): 1 INJECTION, POWDER, FOR SOLUTION INTRAMUSCULAR; INTRAVENOUS at 02:55

## 2019-09-21 RX ADMIN — MUPIROCIN 1 APPLICATION(S): 20 OINTMENT TOPICAL at 07:18

## 2019-09-21 RX ADMIN — Medication 250 MILLIGRAM(S): at 13:40

## 2019-09-21 RX ADMIN — CEFEPIME 100 MILLIGRAM(S): 1 INJECTION, POWDER, FOR SOLUTION INTRAMUSCULAR; INTRAVENOUS at 10:06

## 2019-09-21 RX ADMIN — Medication 600 MILLIGRAM(S): at 11:50

## 2019-09-21 RX ADMIN — Medication 250 MILLIGRAM(S): at 01:00

## 2019-09-21 RX ADMIN — CEFEPIME 100 MILLIGRAM(S): 1 INJECTION, POWDER, FOR SOLUTION INTRAMUSCULAR; INTRAVENOUS at 18:51

## 2019-09-21 NOTE — ED CDU PROVIDER SUBSEQUENT DAY NOTE - MEDICAL DECISION MAKING DETAILS
Chronic left foot wound not healing, sent in for IV antibiotics, MRI r/o osteo, Podiatry following. Chronic left foot wound not healing, sent in for IV antibiotics, MRI to assess for osteomyelitis, Podiatry following.  Exam otherwise normal and pt well appearing and in NAD>

## 2019-09-21 NOTE — H&P ADULT - HISTORY OF PRESENT ILLNESS
64yoM with no PMH presenting with a chronic foot wound, sent in by podiatry for r/o osteomyelitis and IV abx as swelling has worsened in the last few days. Wound caused by filing callouses in July. Broke skin with metal file, bandaged it.  Wound did not heal well and at its largest was the size of a quarter. Today the size of a dime. S/p 2 PO antibiotic regimens (amoxicillin, then cipro). Took all abx as prescribed. Outpatient podiatry able to probe "almost to bone" today. No fever, chills, or night sweats. Swelling of the foot and ankle, increased in past few days. Pain with prolonged standing. [place] [employer] standing all day,

## 2019-09-21 NOTE — ED CDU PROVIDER SUBSEQUENT DAY NOTE - ATTENDING CONTRIBUTION TO CARE
ED Attending (Dr Thrasher): I have personally performed a face to face bedside history and physical examination of this patient.  I have discussed the case with the PA and  I have personally authored the following components: HPI, ROS, Physical Exam and MDM in addition to reviewing and revising the rest of the Provider Note. Chronic left foot wound not healing, sent in for IV antibiotics, MRI to assess for osteomyelitis, Podiatry following.  Exam otherwise normal and pt well appearing and in NAD>

## 2019-09-21 NOTE — ED CDU PROVIDER SUBSEQUENT DAY NOTE - PROGRESS NOTE DETAILS
CDU Att PN: Price  Pt feeling well.  No complaints.   I have personally performed a face to face evaluation of this patient including review of the history and focused physical exam.  I have discussed the case and reviewed the plan of care with the PA.

## 2019-09-21 NOTE — PATIENT PROFILE ADULT - DO YOU FEEL LIKE HURTING OTHERS?
[facility] called, cardiac rehab ordered in 2017 by Dr Dameon Otoole was denied through insurance  [facility] is looking for additional dx codes that would cover rehab, Cardiomyopathy is not an approved one       C/b # [id number]  Fax # [phone]
P/C to Lizette @ [facility] to call me back with DOS  Spoke with Lizette-new dx codes given   ICD, PSVT
no

## 2019-09-21 NOTE — ED CDU PROVIDER DISPOSITION NOTE - CLINICAL COURSE
CDU Att Admit Note: Price  Pt chronic non healing wound who presented for soft tissue infection and placed in CDU d/t posiatry request for MRI.  Generally well otherwise.  No Osteo on MRI but Pods requesting admit to IM for IV Abx and further inpaientn E&M.   I have personally performed a face to face evaluation of this patient including review of the history and focused physical exam.  I have discussed the case and reviewed the plan of care with the PA.

## 2019-09-21 NOTE — ED CDU PROVIDER SUBSEQUENT DAY NOTE - HISTORY
JANNET Valentine: pt sleeping comfortably in bed NAD, pt receiving IV antibiotics.  Pt completed MRI awaiting results.  Podiatry following.  Will continue to monitor. Pt sent to CDU for MRI to assess for possible osteomyelitis.     JANNET Valentine: pt sleeping comfortably in bed NAD, pt receiving IV antibiotics.  Pt completed MRI awaiting results.  Podiatry following.  Will continue to monitor.

## 2019-09-21 NOTE — H&P ADULT - ASSESSMENT
63 y/o M w/ LF sub met 3 ulcer to bone w/ cellulitic forefoot:    - Evaluated by podiatry , wound care , MRI neg for osteo , iv abx , ID eval

## 2019-09-22 ENCOUNTER — TRANSCRIPTION ENCOUNTER (OUTPATIENT)
Age: 64
End: 2019-09-22

## 2019-09-22 LAB
HCV AB S/CO SERPL IA: 0.11 S/CO — SIGNIFICANT CHANGE UP (ref 0–0.99)
HCV AB SERPL-IMP: SIGNIFICANT CHANGE UP
VANCOMYCIN TROUGH SERPL-MCNC: 10.1 UG/ML — SIGNIFICANT CHANGE UP (ref 10–20)

## 2019-09-22 RX ADMIN — CEFEPIME 100 MILLIGRAM(S): 1 INJECTION, POWDER, FOR SOLUTION INTRAMUSCULAR; INTRAVENOUS at 01:11

## 2019-09-22 RX ADMIN — Medication 250 MILLIGRAM(S): at 16:20

## 2019-09-22 RX ADMIN — CEFEPIME 100 MILLIGRAM(S): 1 INJECTION, POWDER, FOR SOLUTION INTRAMUSCULAR; INTRAVENOUS at 18:41

## 2019-09-22 RX ADMIN — MUPIROCIN 1 APPLICATION(S): 20 OINTMENT TOPICAL at 18:41

## 2019-09-22 RX ADMIN — MUPIROCIN 1 APPLICATION(S): 20 OINTMENT TOPICAL at 05:02

## 2019-09-22 RX ADMIN — ENOXAPARIN SODIUM 40 MILLIGRAM(S): 100 INJECTION SUBCUTANEOUS at 12:07

## 2019-09-22 RX ADMIN — CEFEPIME 100 MILLIGRAM(S): 1 INJECTION, POWDER, FOR SOLUTION INTRAMUSCULAR; INTRAVENOUS at 10:17

## 2019-09-22 RX ADMIN — Medication 250 MILLIGRAM(S): at 03:39

## 2019-09-22 NOTE — DISCHARGE NOTE PROVIDER - HOSPITAL COURSE
64yoM with no PMH presenting with a chronic foot wound, sent in by podiatry for r/o osteomyelitis and IV abx as swelling has worsened in the last few days. Wound caused by filing callouses in July. Broke skin with metal file, bandaged it.  Wound did not heal well and at its largest was the size of a quarter. Today the size of a dime. S/p 2 PO antibiotic regimens (amoxicillin, then cipro). Took all abx as prescribed. Outpatient podiatry able to probe "almost to bone" today. No fever, chills, or night sweats. Swelling of the foot and ankle, increased in past few days. Pain with prolonged standing. [place] [employer] standing all day.         Patient was admitted to the observation unit for IV abx and Podiatry and MRI to r/o osteo. Due to concerns for osteo patient was admitted to [facility] Medicine service for further management. Patient was continued on IV abx and had MRI showing tenosynovitis and possible bursitis but no abscess or evidence of Osteo. Patient improved on IV Abx and was transitioned to oral antibiotics. Patient was discharged home in stable condition on 9/23/19 with continuing oral antibiotics, would care instructions, and further podiatry follow-up. Patient was given return instructions for worsening symptoms or condition.

## 2019-09-22 NOTE — DISCHARGE NOTE PROVIDER - NSDCFUADDINST_GEN_ALL_CORE_FT
Podiatry Discharge Instructions:  Follow up: Please follow up with Dr. Richards within 1 week of discharge from the hospital, please call [phone] for appointment and discuss that you recently were seen in the hospital.  Wound Care: Please make daily dressing changes with application of Mupirocin ointment, 4x4 gauze and Ted.  Weight bearing: Please weight bear as tolerated in a surgical shoe.  Antibiotics: Please continue as instructed. Wound Care: Please make daily dressing changes with application of Mupirocin ointment, 4x4 gauze and Ted.  Weight bearing: Please weight bear as tolerated in a surgical shoe.  Antibiotics: Please continue as instructed.

## 2019-09-22 NOTE — DISCHARGE NOTE PROVIDER - NSFOLLOWUPCLINICS_GEN_ALL_ED_FT
[facility]  Podiatry  [address] [place] [address] - [address] [place] [address]  Phone: [phone]  Fax:   Follow Up Time:

## 2019-09-22 NOTE — DISCHARGE NOTE PROVIDER - NSDCFUADDAPPT_GEN_ALL_CORE_FT
Follow up: Please follow up with Dr. Richards within 1 week of discharge from the hospital, please call [phone] for appointment and discuss that you recently were seen in the hospital.

## 2019-09-23 PROCEDURE — 99222 1ST HOSP IP/OBS MODERATE 55: CPT

## 2019-09-23 RX ORDER — MUPIROCIN 20 MG/G
1 OINTMENT TOPICAL
Qty: 1 | Refills: 0
Start: 2019-09-23 | End: 2019-10-22

## 2019-09-23 RX ADMIN — Medication 250 MILLIGRAM(S): at 04:33

## 2019-09-23 RX ADMIN — CEFEPIME 100 MILLIGRAM(S): 1 INJECTION, POWDER, FOR SOLUTION INTRAMUSCULAR; INTRAVENOUS at 10:36

## 2019-09-23 RX ADMIN — CEFEPIME 100 MILLIGRAM(S): 1 INJECTION, POWDER, FOR SOLUTION INTRAMUSCULAR; INTRAVENOUS at 01:32

## 2019-09-23 RX ADMIN — Medication 250 MILLIGRAM(S): at 16:52

## 2019-09-23 RX ADMIN — MUPIROCIN 1 APPLICATION(S): 20 OINTMENT TOPICAL at 18:52

## 2019-09-23 RX ADMIN — ENOXAPARIN SODIUM 40 MILLIGRAM(S): 100 INJECTION SUBCUTANEOUS at 12:47

## 2019-09-23 RX ADMIN — CEFEPIME 100 MILLIGRAM(S): 1 INJECTION, POWDER, FOR SOLUTION INTRAMUSCULAR; INTRAVENOUS at 18:52

## 2019-09-23 RX ADMIN — MUPIROCIN 1 APPLICATION(S): 20 OINTMENT TOPICAL at 05:17

## 2019-09-23 NOTE — PROGRESS NOTE ADULT - ASSESSMENT
65 y/o M w/ LF sub met 3 ulcer to bone w/ cellulitic forefoot:    - Evaluated by podiatry , wound care , MRI neg for osteo , iv abx , ID eval
63 y/o M w/ LF sub met 3 ulcer to bone w/ cellulitic forefoot:    - Evaluated by podiatry , wound care , MRI neg for osteo , iv abx     - left foot ulcer with wound culture positive fo Acinetobacter  Imaging with synovitis
63 y/o M w/ LF sub met 3 ulcer to bone w/ cellulitic forefoot:  -pt seen and evaluated  -vitals stable, no leukocytosis,   -LF X-ray negative for OM  -stable left foot sub me 3 wound probes to bone centrally, fibrogranular base, no active drainage, forefoot erythematous and warm, no fluctuance or crepitation   -discussed option for surgery wiht pt - will need to have met head removed to allow wound to heal. Pt refusing any surgical intervention at this moment, will continue with local wound care   -dressed w/ mupirocin, DSD, and patti  -pod stable for d/c, will consider any surgical intervention as outpatient. recommend discharge on Levofloxacin x 1 week  -Discussed w/ attending
63 y/o M w/ LF sub met 3 ulcer to bone w/ cellulitic forefoot:  -pt seen and evaluated  -vitals stable, no leukocytosis,   -LF X-ray negative for OM or subcutaneous gas   -MR results still pending final read   -left foot sub me 3 wound probes to bone centrally, fibrogranular base, no active drainage, forefoot erythematous and warm, no fluctuance or crepitation   -wound culture currently not growing anything  -dressed w/ mupriocin, DSD, and patti  -admit to medicine for IV abx w/ vanco and cefepime  -will cont to follow to monitor progression on IV abx/ MR results   -Seen w/ attending
65 y/o M w/ LF sub met 3 ulcer to bone w/ cellulitic forefoot:  -pt seen and evaluated  -vitals stable, no leukocytosis,   -LF X-ray negative for OM or subcutaneous gas   -MR negative for OM but after discussion with Dr. De Leon, early osteomyelitis vs osteitis. To contact Dr Fulton (MRI report author) on 9/23 to reassess the impression and findings.  -stable left foot sub me 3 wound probes to bone centrally, fibrogranular base, no active drainage, forefoot erythematous and warm, no fluctuance or crepitation   -prelim wound culture currently growing CNS   -dressed w/ mupirocin, DSD, and patti  - c/w IV abx w/ vanco and cefepime  -pod stable for d/c & surg intvn as outpatient  -Rec d/c on PO ABx pending final WCx  -Seen w/ attending
65 y/o M w/ LF sub met 3 ulcer to bone w/ cellulitic forefoot:  -pt seen and evaluated  -vitals stable, no leukocytosis,   -LF X-ray negative for OM or subcutaneous gas   -left foot sub me 3 wound probes to bone centrally, fibrogranular base, no active drainage, forefoot erythematous and warm, no fluctuance or crepitation   -wound culture currently not growing anything  -dressed w/ betadine, DSD, and ACE  -rec admit to CDU for IV abx and monitor progression of cellulitis  -ordered LF MR to r/o OM- high clinical suspicion   -wound dresssed w/ mupriocin, 4x4 gauze, and patti  -will cont to follow to monitor progression on IV abx/ MR results   -Discussed w/ attending

## 2019-09-23 NOTE — CONSULT NOTE ADULT - SUBJECTIVE AND OBJECTIVE BOX
HPI:  64yoM with no PMH presenting with a chronic foot wound, sent in by podiatry for r/o osteomyelitis and IV abx as swelling has worsened in the last few days. Wound caused by filing callouses in July. Broke skin with metal file, bandaged it.  Wound did not heal well and at its largest was the size of a quarter. Today the size of a dime. S/p 2 PO antibiotic regimens (amoxicillin, then cipro). Took all abx as prescribed. Outpatient podiatry able to probe "almost to bone" today. No fever, chills, or night sweats. Swelling of the foot and ankle, increased in past few days. Pain with prolonged standing. Works at [employer] standing all day, (21 Sep 2019 12:45)      PAST MEDICAL & SURGICAL HISTORY:  Wound: (chronic wound to plantar aspect of left foot)  No significant past surgical history      Allergies    No Known Allergies    Intolerances        ANTIMICROBIALS:  cefepime   IVPB 1000 every 8 hours  cefepime   IVPB    vancomycin  IVPB 1000 every 12 hours      OTHER MEDS:  enoxaparin Injectable 40 milliGRAM(s) SubCutaneous daily  ibuprofen  Tablet. 600 milliGRAM(s) Oral every 6 hours PRN  mupirocin 2% Ointment 1 Application(s) Topical two times a day      SOCIAL HISTORY:    FAMILY HISTORY:  Family history of COPD (chronic obstructive pulmonary disease): Mother      REVIEW OF SYSTEMS  [  ] ROS unobtainable because:    [  ] All other systems negative except as noted below:	    Constitutional:  [ ] fever [ ] chills  [ ] weight loss  [ ] weakness  Skin:  [ ] rash [ ] phlebitis	  Eyes: [ ] icterus [ ] pain  [ ] discharge	  ENMT: [ ] sore throat  [ ] thrush [ ] ulcers [ ] exudates  Respiratory: [ ] dyspnea [ ] hemoptysis [ ] cough [ ] sputum	  Cardiovascular:  [ ] chest pain [ ] palpitations [ ] edema	  Gastrointestinal:  [ ] nausea [ ] vomiting [ ] diarrhea [ ] constipation [ ] pain	  Genitourinary:  [ ] dysuria [ ] frequency [ ] hematuria [ ] discharge [ ] flank pain  [ ] incontinence  Musculoskeletal:  [ ] myalgias [ ] arthralgias [ ] arthritis  [ ] back pain  Neurological:  [ ] headache [ ] seizures  [ ] confusion/altered mental status  Psychiatric:  [ ] anxiety [ ] depression	  Hematology/Lymphatics:  [ ] lymphadenopathy  Endocrine:  [ ] adrenal [ ] thyroid  Allergic/Immunologic:	 [ ] transplant [ ] seasonal    PHYSICAL EXAM:  General: [ ] non-toxic  HEAD/EYES: [ ] PERRL [ ] white sclera [ ] icterus  ENT:  [ ] normal [ ] supple [ ] thrush [ ] pharyngeal exudate  Cardiovascular:   [ ] murmur [ ] normal [ ] PPM/AICD  Respiratory:  [ ] clear to ausculation bilaterally  GI:  [ ] soft, non-tender, normal bowel sounds  :  [ ] vasquez [ ] no CVA tenderness   Musculoskeletal:  [ ] no synovitis  Neurologic:  [ ] non-focal exam   Skin:  [ ] no rash  Lymph: [ ] no lymphadenopathy  Psychiatric:  [ ] appropriate affect [ ] alert & oriented  Lines:  [ ] no phlebitis [ ] central line          Drug Dosing Weight  Height (cm): 175.3 (22 Sep 2019 01:00)  Weight (kg): 103.8 (22 Sep 2019 01:00)  BMI (kg/m2): 33.8 (22 Sep 2019 01:00)  BSA (m2): 2.19 (22 Sep 2019 01:00)    Vital Signs Last 24 Hrs  T(F): 98 (09-23-19 @ 14:00), Max: 98.6 (09-21-19 @ 10:10)    Vital Signs Last 24 Hrs  HR: 91 (09-23-19 @ 14:00) (69 - 91)  BP: 128/74 (09-23-19 @ 14:00) (128/72 - 141/85)  RR: 16 (09-23-19 @ 14:00)  SpO2: 100% (09-23-19 @ 14:00) (95% - 100%)  Wt(kg): --                    MICROBIOLOGY:    RADIOLOGY: HPI:  64yoM with no PMH presenting with a chronic foot wound, sent in by podiatry for r/o osteomyelitis    Pt developed a foot ulcer in 2019 after removing a Callus. He was treated in July with antibiotics- initially improved.     Hs pain and the size of the ulcer tend to improve when he is not working.  tend to get worse/ larger when he works on his feet.      Recently has changed from size of a quarter ot size of a dime.  No fever.      No fever, chills, or night sweats. Swelling of the foot and ankle, increased in past few days. Pain with prolonged standing. Works at [employer] standing all day,    PAST MEDICAL & SURGICAL HISTORY:  Wound: (chronic wound to plantar aspect of left foot)  No significant past surgical history      Allergies    No Known Allergies    Intolerances        ANTIMICROBIALS:  cefepime   IVPB 1000 every 8 hours  cefepime   IVPB    vancomycin  IVPB 1000 every 12 hours      OTHER MEDS:  enoxaparin Injectable 40 milliGRAM(s) SubCutaneous daily  ibuprofen  Tablet. 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      FAMILY HISTORY:  Family history of COPD (chronic obstructive pulmonary disease): Mother  at 92  Father  at 89- complications of hospitalization        REVIEW OF SYSTEMS  [  ] ROS unobtainable because:    [ x ] All other systems negative except as noted below:	    Constitutional:  [ ] fever [ ] chills  [ ] weight loss  [ x] weakness  Skin:  [ ] rash [ ] phlebitis	  Eyes: [ ] icterus [ ] pain  [ ] discharge	  ENMT: [ ] sore throat  [ ] thrush [ ] ulcers [ ] exudates  Respiratory: [ ] dyspnea [ ] hemoptysis [ ] cough [ ] sputum	  Cardiovascular:  [ ] chest pain [ ] palpitations [ ] edema	  Gastrointestinal:  [ ] nausea [ ] vomiting [ ] diarrhea [ ] constipation [ ] pain	  Genitourinary:  [ ] dysuria [ ] frequency [ ] hematuria [ ] discharge [ ] flank pain  [ ] incontinence  Musculoskeletal:  [ ] myalgias [ ] arthralgias [ ] arthritis  [ ] back pain  Neurological:  [ ] headache [ ] seizures  [ ] confusion/altered mental status  Psychiatric:  [ ] anxiety [ ] depression	  Hematology/Lymphatics:  [ ] lymphadenopathy  Endocrine:  [ ] adrenal [ ] thyroid  Allergic/Immunologic:	 [ ] transplant [ ] seasonal    PHYSICAL EXAM:  General: [ x] non-toxic  HEAD/EYES: [ ] PERRL [ x] white sclera [ ] icterus  ENT:  [ ] normal [x ] supple [ ] thrush [ ] pharyngeal exudate  Cardiovascular:   [ ] murmur [ x] normal [ ] PPM/AICD  Respiratory:  [x ] clear to ausculation bilaterally  GI:  [x ] soft, non-tender, normal bowel sounds  :  [ ] vasquez [x ] no CVA tenderness   Musculoskeletal:  [ ]x no synovitis  Neurologic:  [ ] non-focal exam   Skin:  [ x] plantar aspect left foot- small ulcer  no pus  no surrounding cellulitis at this time    Lymph: [ x] no lymphadenopathy  Psychiatric:  [ ] appropriate affect [x ] alert & oriented  Lines:  [ x] no phlebitis [ ] central line          Drug Dosing Weight  Height (cm): 175.3 (22 Sep 2019 01:00)  Weight (kg): 103.8 (22 Sep 2019 01:00)  BMI (kg/m2): 33.8 (22 Sep 2019 01:00)  BSA (m2): 2.19 (22 Sep 2019 01:00)    Vital Signs Last 24 Hrs  T(F): 98 (19 @ 14:00), Max: 98.6 (19 @ 10:10)    Vital Signs Last 24 Hrs  HR: 91 (19 @ 14:00) (69 - 91)  BP: 128/74 (19 @ 14:00) (128/72 - 141/85)  RR: 16 (19 @ 14:00)  SpO2: 100% (19 @ 14:00) (95% - 100%)  Wt(kg): --                    MICROBIOLOGY:    RADIOLOGY:  < from: MR Foot w/ IV Cont, Left (19 @ 20:00) >    EXAM:  MR FOOT IC LT        PROCEDURE DATE:  Sep 20 2019         INTERPRETATION:    LEFT FOOT MRI WITH AND WITHOUT CONTRAST    CLINICAL INFORMATION: Plantar foot swelling. Evaluate for osteomyelitis.  TECHNIQUE: Multiplanar, multisequence MRI was obtained of the left foot   with and without contrast. 9 cc of Gadavist was administered   intravenously. Comparison is made to radiographs from 2019.    FINDINGS:    At the plantar aspect of the forefoot just below the third MTP joint   there is a soft tissue ulcer that is filled with low signal material   likely a combination of gas and fibrotic debris. This tracks from the   skin to just proximal to the third metatarsal head. There is adjacent   soft tissue edema and enhancement without evidence of a soft tissue   abscess.     There is a somewhat amorphous appearing fluid collection at the plantar   aspect of the first MTP joint with minimal peripheral enhancement. This   measures 1.0 x 1.3 x 1.0 cm. There is no obvious associated sinustract   to the skin. This may represent an adventitial bursa. Infected fluid   cannot be entirely excluded.    There is mild dorsal subcutaneous soft tissue edema.    Mild tenosynovitis is noted of the great toe flexor tendon.    There is no osteomyelitis.    There is chronic dorsal dislocation at the second MTP joint.    IMPRESSION:    Soft tissue ulcer at the plantar aspect of the third MCP joint without an   associated abscess or peripherally enhancing sinus tract. There is   adjacent soft tissue edema and enhancement compatible with synovitis.    At the plantar aspect of the great toe is a somewhat amorphous appearing   small fluid collection with minimal peripheral enhancement that does not   appear to be patent the scan and may represent an adventitial bursa.   Infected fluid cannot be entirely excluded.    No osteomyelitis.      < end of copied text >

## 2019-09-23 NOTE — CONSULT NOTE ADULT - ASSESSMENT
64 year old with left foot ulcer with wound culture positive fo Acinetobacter  Imaging with synovitis but without evidence of OM.    Pt has refused surgical exploration of the wound.    Can treat with levofloxacin for 14 days.  Pt advised re risk of tendo rupture and QT prolongation (arrythmia)   Check baseline EKG    I discussed with the patient that he may fail anabiotics (50% chance) and require surgery in the future.  I explained that surgery at this time may help to clean the area of the ulcer, assess for bone/ joint infection,. change treatment duration -b ut he states that he prefers to try anabiotics with close observation as outpt.    Follow up as outpt [phone] 64 year old with left foot ulcer with wound culture positive fo Acinetobacter  Imaging with synovitis but without evidence of OM.    Pt has refused surgical exploration of the wound.    Can treat with levofloxacin for 14 days.  Pt advised re risk of tendo rupture and QT prolongation (arrythmia)   Check baseline EKG    I discussed with the patient that he may fail anabiotics (50% chance) and require surgery in the future.  I explained that surgery at this time may help to clean the area of the ulcer, assess for bone/ joint infection,. change treatment duration -b ut he states that he prefers to try anabiotics with close observation as outpt.    Follow up as outpt [phone]

## 2019-09-23 NOTE — PROGRESS NOTE ADULT - SUBJECTIVE AND OBJECTIVE BOX
SUBJECTIVE / OVERNIGHT EVENTS: pt denies chest pain, shortness of breath     MEDICATIONS  (STANDING):  cefepime   IVPB 1000 milliGRAM(s) IV Intermittent every 8 hours  cefepime   IVPB      enoxaparin Injectable 40 milliGRAM(s) SubCutaneous daily  mupirocin 2% Ointment 1 Application(s) Topical two times a day  vancomycin  IVPB 1000 milliGRAM(s) IV Intermittent every 12 hours    MEDICATIONS  (PRN):  ibuprofen  Tablet. 600 milliGRAM(s) Oral every 6 hours PRN Mild Pain (1 - 3), Moderate Pain (4 - 6)    Vital Signs Last 24 Hrs  T(C): 36.7 (23 Sep 2019 14:00), Max: 36.8 (22 Sep 2019 21:00)  T(F): 98 (23 Sep 2019 14:00), Max: 98.3 (23 Sep 2019 01:00)  HR: 91 (23 Sep 2019 14:00) (69 - 91)  BP: 128/74 (23 Sep 2019 14:00) (128/72 - 141/85)  BP(mean): --  RR: 16 (23 Sep 2019 14:00) (16 - 18)  SpO2: 100% (23 Sep 2019 14:00) (95% - 100%)    Constitutional: No fever, fatigue  Skin: No rash.  Eyes: No recent vision problems or eye pain.  ENT: No congestion, ear pain, or sore throat.  Cardiovascular: No chest pain or palpation.  Respiratory: No cough, shortness of breath, congestion, or wheezing.  Gastrointestinal: No abdominal pain, nausea, vomiting, or diarrhea.  Genitourinary: No dysuria.  Musculoskeletal: No joint swelling.  Neurologic: No headache.    PHYSICAL EXAM:  GENERAL: NAD  EYES: EOMI, PERRLA  NECK: Supple, No JVD  CHEST/LUNG: cta lindsay   HEART:  S1 , S2 +  ABDOMEN: soft , bs+  EXTREMITIES: left  foot dressing +  NEUROLOGY:alert awake oriented     LABS:        Creatinine Trend: 0.83 <--, 0.89 <--    Urine Studies:                                  RADIOLOGY & ADDITIONAL TESTS:    Imaging Personally Reviewed:    Consultant(s) Notes Reviewed:      Care Discussed with Consultants/Other Providers:
Patient is a 64y old  Male who presents with a chief complaint of      INTERVAL HPI/OVERNIGHT EVENTS:  Patient seen and evaluated at bedside.  Pt is resting comfortable in NAD. Denies N/V/F/C.      Allergies    No Known Allergies    Intolerances        Vital Signs Last 24 Hrs  T(C): 36.5 (22 Sep 2019 05:00), Max: 37 (21 Sep 2019 10:10)  T(F): 97.7 (22 Sep 2019 05:00), Max: 98.6 (21 Sep 2019 10:10)  HR: 77 (22 Sep 2019 05:00) (77 - 88)  BP: 135/68 (22 Sep 2019 05:00) (127/87 - 154/90)  BP(mean): --  RR: 18 (22 Sep 2019 05:00) (16 - 18)  SpO2: 100% (22 Sep 2019 05:00) (99% - 100%)    LABS:              CAPILLARY BLOOD GLUCOSE          Lower Extremity Physical Exam:  Vascular: DP/PT 2/4 B/L, CFT <3 seconds B/L, Temperature gradient warm to cool on R, warm to warm on L  Neuro: Epicritic sensation diminished to the level of toes B/L  Musculoskeletal/Ortho: cavovarus foot type b/l  Skin: LLE +3 pitting edema w/ venous stasis and varicosities b/l    Wound #1:   Location: left foot sub met 3  Size: 2x2cm in diameter  Depth: probes to bone  Wound bed: fibrogranular  Drainage: none  Odor: none  Periwound: hyperkeratotic  Etiology: pressure, neuropathy    RADIOLOGY & ADDITIONAL TESTS:    < from: MR Foot w/ IV Cont, Left (09.20.19 @ 20:00) >    EXAM:  MR FOOT IC LT        PROCEDURE DATE:  Sep 20 2019         INTERPRETATION:    LEFT FOOT MRI WITH AND WITHOUT CONTRAST    CLINICAL INFORMATION: Plantar foot swelling. Evaluate for osteomyelitis.  TECHNIQUE: Multiplanar, multisequence MRI was obtained of the left foot   with and without contrast. 9 cc of Gadavist was administered   intravenously. Comparison is made to radiographs from 9/19/2019.    FINDINGS:    At the plantar aspect of the forefoot just below the third MTP joint   there is a soft tissue ulcer that is filled with low signal material   likely a combination of gas and fibrotic debris. This tracks from the   skin to just proximal to the third metatarsal head. There is adjacent   soft tissue edema and enhancement without evidence of a soft tissue   abscess.     There is a somewhat amorphous appearing fluid collection at the plantar   aspect of the first MTP joint with minimal peripheral enhancement. This   measures 1.0 x 1.3 x 1.0 cm. There is no obvious associated sinustract   to the skin. This may represent an adventitial bursa. Infected fluid   cannot be entirely excluded.    There is mild dorsal subcutaneous soft tissue edema.    Mild tenosynovitis is noted of the great toe flexor tendon.    There is no osteomyelitis.    There is chronic dorsal dislocation at the second MTP joint.    IMPRESSION:    Soft tissue ulcer at the plantar aspect of the third MCP joint without an   associated abscess or peripherally enhancing sinus tract. There is   adjacent soft tissue edema and enhancement compatible with synovitis.    At the plantar aspect of the great toe is a somewhat amorphous appearing   small fluid collection with minimal peripheral enhancement that does not   appear to be patent the scan and may represent an adventitial bursa.   Infected fluid cannot be entirely excluded.    No osteomyelitis.                  CITLALI VALENZUELA M.D., ATTENDING RADIOLOGIST  This document has been electronically signed. Sep 21 2019  7:33AM    < end of copied text >
Patient is a 64y old  Male who presents with a chief complaint of Foot Cellulitis (22 Sep 2019 09:26)       INTERVAL HPI/OVERNIGHT EVENTS:  Patient seen and evaluated at bedside.  Pt is resting comfortable in NAD. Denies N/V/F/C.      Allergies    No Known Allergies    Intolerances        Vital Signs Last 24 Hrs  T(C): 36.4 (23 Sep 2019 09:04), Max: 36.8 (22 Sep 2019 21:00)  T(F): 97.6 (23 Sep 2019 09:04), Max: 98.3 (23 Sep 2019 01:00)  HR: 69 (23 Sep 2019 09:04) (69 - 97)  BP: 128/72 (23 Sep 2019 09:04) (119/84 - 141/85)  BP(mean): --  RR: 16 (23 Sep 2019 09:04) (16 - 18)  SpO2: 95% (23 Sep 2019 09:04) (95% - 99%)    LABS:              CAPILLARY BLOOD GLUCOSE          Lower Extremity Physical Exam:  Vascular: DP/PT 2/4 B/L, CFT <3 seconds B/L, Temperature gradient warm to cool on R, warm to warm on L  Neuro: Epicritic sensation diminished to the level of toes B/L  Musculoskeletal/Ortho: cavovarus foot type b/l  Skin: LLE +3 pitting edema w/ venous stasis and varicosities b/l    Wound #1:   Location: left foot sub met 3  Size: 2x2cm in diameter  Depth: probes to bone  Wound bed: fibrogranular  Drainage: none  Odor: none  Periwound: hyperkeratotic  Etiology: pressure, neuropathy    RADIOLOGY & ADDITIONAL TESTS:
Podiatry pager #: [phone] ([place])/ [phone] ([facility])    Patient is a 64y old  Male who presents with a chief complaint of      INTERVAL HPI/OVERNIGHT EVENTS:  Patient seen and evaluated at bedside.  Pt is resting comfortable in NAD. Denies N/V/F/C.     Allergies    No Known Allergies    Intolerances        Vital Signs Last 24 Hrs  T(C): 36.6 (20 Sep 2019 01:38), Max: 36.6 (19 Sep 2019 18:21)  T(F): 97.8 (20 Sep 2019 01:38), Max: 97.9 (19 Sep 2019 18:21)  HR: 67 (20 Sep 2019 01:38) (67 - 84)  BP: 137/79 (20 Sep 2019 01:38) (108/57 - 156/75)  BP(mean): --  RR: 17 (20 Sep 2019 01:13) (16 - 17)  SpO2: 98% (20 Sep 2019 01:38) (98% - 100%)    LABS:                        13.3   7.80  )-----------( 169      ( 20 Sep 2019 06:00 )             41.3     09-20    140  |  101  |  15  ----------------------------<  95  4.6   |  26  |  0.83    Ca    9.0      20 Sep 2019 06:00    TPro  8.1  /  Alb  4.3  /  TBili  0.6  /  DBili  x   /  AST  19  /  ALT  15  /  AlkPhos  62  09-19        CAPILLARY BLOOD GLUCOSE          Lower Extremity Physical Exam:  Vascular: DP/PT 2/4 B/L, CFT <3 seconds B/L, Temperature gradient warm to cool on R, warm to warm on L  Neuro: Epicritic sensation diminished to the level of toes B/L  Musculoskeletal/Ortho: cavovarus foot type b/l  Skin: LLE +3 pitting edema w/ venous stasis and varicosities b/l    Wound #1:   Location: left foot sub met 3  Size: 2x2cm in diameter  Depth: probes to bone  Wound bed: fibrogranular  Drainage: none  Odor: none  Periwound: hyperkeratotic  Etiology: pressure, neuropathy    RADIOLOGY & ADDITIONAL TESTS:  < from: Xray Foot AP + Lateral, Left (09.19.19 @ 22:14) >  EXAM:  RAD FOOT 2 VIEWS LEFT        PROCEDURE DATE:  Sep 19 2019         INTERPRETATION:  CLINICAL INDICATION: Left foot pain. Evaluate base of   left foot for osteomyelitis.    TECHNIQUE: 2 views left foot    COMPARISON: Left foot x-ray 6/27/2019    FINDINGS:    No subcutaneous gas collection, bony erosion or periosteal reaction   suggestive of advanced osteomyelitis.     Demineralized bones. Plantar calcaneal enthesophyte formation.    No acute fracture or dislocation. Joint spaces are preserved. No   radiopaque foreign body.      IMPRESSION:    No subcutaneous gas collection, bony erosion or periosteal reaction   suggestive of advanced osteomyelitis. MRI is more sensitive.              ALEX LOWE M.D., RADIOLOGY RESIDENT  This document has been electronically signed.  MANI PARSONS M.D., ATTENDING RADIOLOGIST  This document has been electronically signed. Sep 20 2019  6:24AM              < end of copied text >
Podiatry pager #: [phone] ([place])/ [phone] ([facility])    Patient is a 64y old  Male who presents with a chief complaint of      INTERVAL HPI/OVERNIGHT EVENTS:  Patient seen and evaluated at bedside.  Pt is resting comfortable in NAD. Denies N/V/F/C.     Allergies    No Known Allergies    Intolerances        Vital Signs Last 24 Hrs  T(C): 36.6 (21 Sep 2019 06:10), Max: 36.9 (20 Sep 2019 14:05)  T(F): 97.9 (21 Sep 2019 06:10), Max: 98.5 (20 Sep 2019 14:05)  HR: 72 (21 Sep 2019 06:10) (72 - 89)  BP: 144/97 (21 Sep 2019 06:10) (107/72 - 153/78)  BP(mean): --  RR: 16 (21 Sep 2019 06:10) (16 - 18)  SpO2: 100% (21 Sep 2019 06:10) (97% - 100%)    LABS:                        13.3   7.80  )-----------( 169      ( 20 Sep 2019 06:00 )             41.3     09-20    140  |  101  |  15  ----------------------------<  95  4.6   |  26  |  0.83    Ca    9.0      20 Sep 2019 06:00    TPro  8.1  /  Alb  4.3  /  TBili  0.6  /  DBili  x   /  AST  19  /  ALT  15  /  AlkPhos  62  09-19        CAPILLARY BLOOD GLUCOSE          Lower Extremity Physical Exam:    Vascular: DP/PT 2/4 B/L, CFT <3 seconds B/L, Temperature gradient warm to cool on R, warm to warm on L  Neuro: Epicritic sensation diminished to the level of toes B/L  Musculoskeletal/Ortho: cavovarus foot type b/l  Skin: LLE +3 pitting edema w/ venous stasis and varicosities b/l    Wound #1:   Location: left foot sub met 3  Size: 2x2cm in diameter  Depth: probes to bone  Wound bed: fibrogranular  Drainage: none  Odor: none  Periwound: hyperkeratotic  Etiology: pressure, neuropathy  RADIOLOGY & ADDITIONAL TESTS:  < from: Xray Foot AP + Lateral, Left (09.19.19 @ 22:14) >  EXAM:  RAD FOOT 2 VIEWS LEFT        PROCEDURE DATE:  Sep 19 2019         INTERPRETATION:  CLINICAL INDICATION: Left foot pain. Evaluate base of   left foot for osteomyelitis.    TECHNIQUE: 2 views left foot    COMPARISON: Left foot x-ray 6/27/2019    FINDINGS:    No subcutaneous gas collection, bony erosion or periosteal reaction   suggestive of advanced osteomyelitis.     Demineralized bones. Plantar calcaneal enthesophyte formation.    No acute fracture or dislocation. Joint spaces are preserved. No   radiopaque foreign body.      IMPRESSION:    No subcutaneous gas collection, bony erosion or periosteal reaction   suggestive of advanced osteomyelitis. MRI is more sensitive.              ALEX LOWE M.D., RADIOLOGY RESIDENT  This document has been electronically signed.  MANI PARSONS M.D., ATTENDING RADIOLOGIST  This document has been electronically signed. Sep 20 2019  6:24AM              < end of copied text >
SUBJECTIVE / OVERNIGHT EVENTS: pt denies chest pain, shortness of breath       MEDICATIONS  (STANDING):  cefepime   IVPB 1000 milliGRAM(s) IV Intermittent every 8 hours  cefepime   IVPB      enoxaparin Injectable 40 milliGRAM(s) SubCutaneous daily  influenza   Vaccine 0.5 milliLiter(s) IntraMuscular once  mupirocin 2% Ointment 1 Application(s) Topical two times a day  vancomycin  IVPB 1000 milliGRAM(s) IV Intermittent every 12 hours    MEDICATIONS  (PRN):  ibuprofen  Tablet. 600 milliGRAM(s) Oral every 6 hours PRN Mild Pain (1 - 3), Moderate Pain (4 - 6)      Vital Signs Last 24 Hrs  T(C): 36.8 (22 Sep 2019 21:00), Max: 37 (22 Sep 2019 09:00)  T(F): 98.2 (22 Sep 2019 21:00), Max: 98.6 (22 Sep 2019 09:00)  HR: 83 (22 Sep 2019 21:00) (73 - 97)  BP: 141/85 (22 Sep 2019 21:00) (119/84 - 142/82)  BP(mean): --  RR: 18 (22 Sep 2019 21:00) (16 - 18)  SpO2: 98% (22 Sep 2019 21:00) (96% - 100%)  CAPILLARY BLOOD GLUCOSE        I&O's Summary      Constitutional: No fever, fatigue  Skin: No rash.  Eyes: No recent vision problems or eye pain.  ENT: No congestion, ear pain, or sore throat.  Cardiovascular: No chest pain or palpation.  Respiratory: No cough, shortness of breath, congestion, or wheezing.  Gastrointestinal: No abdominal pain, nausea, vomiting, or diarrhea.  Genitourinary: No dysuria.  Musculoskeletal: No joint swelling.  Neurologic: No headache.    PHYSICAL EXAM:  GENERAL: NAD  EYES: EOMI, PERRLA  NECK: Supple, No JVD  CHEST/LUNG: cta lindsay   HEART:  S1 , S2 +  ABDOMEN: soft , bs+  EXTREMITIES: left  foot dressing +  NEUROLOGY:alert awake oriented       LABS:                    RADIOLOGY & ADDITIONAL TESTS:    Imaging Personally Reviewed:    Consultant(s) Notes Reviewed:      Care Discussed with Consultants/Other Providers:

## 2019-09-24 ENCOUNTER — TRANSCRIPTION ENCOUNTER (OUTPATIENT)
Age: 64
End: 2019-09-24

## 2019-09-24 VITALS
TEMPERATURE: 98 F | DIASTOLIC BLOOD PRESSURE: 65 MMHG | RESPIRATION RATE: 17 BRPM | SYSTOLIC BLOOD PRESSURE: 132 MMHG | OXYGEN SATURATION: 98 % | HEART RATE: 82 BPM

## 2019-09-24 LAB
BACTERIA BLD CULT: SIGNIFICANT CHANGE UP
BACTERIA BLD CULT: SIGNIFICANT CHANGE UP
VANCOMYCIN TROUGH SERPL-MCNC: 11.7 UG/ML — SIGNIFICANT CHANGE UP (ref 10–20)

## 2019-09-24 RX ORDER — VANCOMYCIN HCL 1 G
1250 VIAL (EA) INTRAVENOUS ONCE
Refills: 0 | Status: COMPLETED | OUTPATIENT
Start: 2019-09-24 | End: 2019-09-24

## 2019-09-24 RX ORDER — VANCOMYCIN HCL 1 G
VIAL (EA) INTRAVENOUS
Refills: 0 | Status: DISCONTINUED | OUTPATIENT
Start: 2019-09-24 | End: 2019-09-24

## 2019-09-24 RX ORDER — VANCOMYCIN HCL 1 G
1250 VIAL (EA) INTRAVENOUS EVERY 12 HOURS
Refills: 0 | Status: DISCONTINUED | OUTPATIENT
Start: 2019-09-24 | End: 2019-09-24

## 2019-09-24 RX ADMIN — CEFEPIME 100 MILLIGRAM(S): 1 INJECTION, POWDER, FOR SOLUTION INTRAMUSCULAR; INTRAVENOUS at 02:22

## 2019-09-24 RX ADMIN — MUPIROCIN 1 APPLICATION(S): 20 OINTMENT TOPICAL at 17:20

## 2019-09-24 RX ADMIN — Medication 166.67 MILLIGRAM(S): at 04:45

## 2019-09-24 NOTE — DISCHARGE NOTE NURSING/CASE MANAGEMENT/SOCIAL WORK - NSDCFUADDAPPT_GEN_ALL_CORE_FT
Follow up: Please follow up with Dr. iRchards within 1 week of discharge from the hospital, please call [phone] for appointment and discuss that you recently were seen in the hospital.

## 2019-09-24 NOTE — DISCHARGE NOTE NURSING/CASE MANAGEMENT/SOCIAL WORK - PATIENT PORTAL LINK FT
You can access the FollowMyHealth Patient Portal offered by [facility] by registering at the following website: [email or web address]. By joining [employer]’s FollowMyHealth portal, you will also be able to view your health information using other applications (apps) compatible with our system.

## 2019-09-25 LAB
-  AMIKACIN: SIGNIFICANT CHANGE UP
-  AMPICILLIN/SULBACTAM: SIGNIFICANT CHANGE UP
-  CEFAZOLIN: SIGNIFICANT CHANGE UP
-  CEFEPIME: SIGNIFICANT CHANGE UP
-  CEFTAZIDIME: SIGNIFICANT CHANGE UP
-  CIPROFLOXACIN: SIGNIFICANT CHANGE UP
-  CIPROFLOXACIN: SIGNIFICANT CHANGE UP
-  CLINDAMYCIN: SIGNIFICANT CHANGE UP
-  ERYTHROMYCIN: SIGNIFICANT CHANGE UP
-  GENTAMICIN: SIGNIFICANT CHANGE UP
-  GENTAMICIN: SIGNIFICANT CHANGE UP
-  LEVOFLOXACIN: SIGNIFICANT CHANGE UP
-  LEVOFLOXACIN: SIGNIFICANT CHANGE UP
-  MEROPENEM: SIGNIFICANT CHANGE UP
-  MOXIFLOXACIN(AEROBIC): SIGNIFICANT CHANGE UP
-  OXACILLIN: SIGNIFICANT CHANGE UP
-  RIFAMPIN.: SIGNIFICANT CHANGE UP
-  TETRACYCLINE: SIGNIFICANT CHANGE UP
-  TOBRAMYCIN: SIGNIFICANT CHANGE UP
-  TRIMETHOPRIM/SULFAMETHOXAZOLE: SIGNIFICANT CHANGE UP
-  VANCOMYCIN: SIGNIFICANT CHANGE UP
CULTURE RESULTS: SIGNIFICANT CHANGE UP
METHOD TYPE: SIGNIFICANT CHANGE UP
ORGANISM # SPEC MICROSCOPIC CNT: SIGNIFICANT CHANGE UP
ORGANISM # SPEC MICROSCOPIC CNT: SIGNIFICANT CHANGE UP

## 2019-11-21 ENCOUNTER — INPATIENT (INPATIENT)
Facility: HOSPITAL | Age: 64
LOS: 5 days | Discharge: HOME | End: 2019-11-27
Attending: HOSPITALIST | Admitting: HOSPITALIST
Payer: COMMERCIAL

## 2019-11-22 VITALS
RESPIRATION RATE: 20 BRPM | OXYGEN SATURATION: 96 % | HEART RATE: 63 BPM | SYSTOLIC BLOOD PRESSURE: 115 MMHG | TEMPERATURE: 98 F | DIASTOLIC BLOOD PRESSURE: 71 MMHG

## 2019-11-22 DIAGNOSIS — Z02.9 ENCOUNTER FOR ADMINISTRATIVE EXAMINATIONS, UNSPECIFIED: ICD-10-CM

## 2019-11-22 DIAGNOSIS — Z29.9 ENCOUNTER FOR PROPHYLACTIC MEASURES, UNSPECIFIED: ICD-10-CM

## 2019-11-22 DIAGNOSIS — J18.9 PNEUMONIA, UNSPECIFIED ORGANISM: ICD-10-CM

## 2019-11-22 DIAGNOSIS — L97.529 NON-PRESSURE CHRONIC ULCER OF OTHER PART OF LEFT FOOT WITH UNSPECIFIED SEVERITY: ICD-10-CM

## 2019-11-22 DIAGNOSIS — I48.92 UNSPECIFIED ATRIAL FLUTTER: ICD-10-CM

## 2019-11-22 LAB
ALBUMIN SERPL ELPH-MCNC: 3 G/DL — LOW (ref 3.3–5)
ALP SERPL-CCNC: 138 U/L — HIGH (ref 40–120)
ALT FLD-CCNC: 35 U/L — SIGNIFICANT CHANGE UP (ref 4–41)
ANION GAP SERPL CALC-SCNC: 11 MMO/L — SIGNIFICANT CHANGE UP (ref 7–14)
APPEARANCE UR: SIGNIFICANT CHANGE UP
AST SERPL-CCNC: 82 U/L — HIGH (ref 4–40)
B PERT DNA SPEC QL NAA+PROBE: NOT DETECTED — SIGNIFICANT CHANGE UP
BACTERIA # UR AUTO: SIGNIFICANT CHANGE UP
BASE EXCESS BLDV CALC-SCNC: -1.8 MMOL/L — SIGNIFICANT CHANGE UP
BASOPHILS # BLD AUTO: 0.05 K/UL — SIGNIFICANT CHANGE UP (ref 0–0.2)
BASOPHILS NFR BLD AUTO: 0.4 % — SIGNIFICANT CHANGE UP (ref 0–2)
BASOPHILS NFR SPEC: 0 % — SIGNIFICANT CHANGE UP (ref 0–2)
BILIRUB SERPL-MCNC: 1.8 MG/DL — HIGH (ref 0.2–1.2)
BILIRUB UR-MCNC: SIGNIFICANT CHANGE UP
BLASTS # FLD: 0 % — SIGNIFICANT CHANGE UP (ref 0–0)
BLOOD GAS VENOUS - CREATININE: 1.11 MG/DL — SIGNIFICANT CHANGE UP (ref 0.5–1.3)
BLOOD GAS VENOUS - FIO2: 21 — SIGNIFICANT CHANGE UP
BLOOD UR QL VISUAL: HIGH
BUN SERPL-MCNC: 26 MG/DL — HIGH (ref 7–23)
C PNEUM DNA SPEC QL NAA+PROBE: NOT DETECTED — SIGNIFICANT CHANGE UP
CALCIUM SERPL-MCNC: 8.9 MG/DL — SIGNIFICANT CHANGE UP (ref 8.4–10.5)
CHLORIDE BLDV-SCNC: 106 MMOL/L — SIGNIFICANT CHANGE UP (ref 96–108)
CHLORIDE SERPL-SCNC: 102 MMOL/L — SIGNIFICANT CHANGE UP (ref 98–107)
CK SERPL-CCNC: 131 U/L — SIGNIFICANT CHANGE UP (ref 30–200)
CO2 SERPL-SCNC: 21 MMOL/L — LOW (ref 22–31)
COLOR SPEC: SIGNIFICANT CHANGE UP
CREAT SERPL-MCNC: 1.05 MG/DL — SIGNIFICANT CHANGE UP (ref 0.5–1.3)
EOSINOPHIL # BLD AUTO: 0.01 K/UL — SIGNIFICANT CHANGE UP (ref 0–0.5)
EOSINOPHIL NFR BLD AUTO: 0.1 % — SIGNIFICANT CHANGE UP (ref 0–6)
EOSINOPHIL NFR FLD: 0.9 % — SIGNIFICANT CHANGE UP (ref 0–6)
FLUAV H1 2009 PAND RNA SPEC QL NAA+PROBE: NOT DETECTED — SIGNIFICANT CHANGE UP
FLUAV H1 RNA SPEC QL NAA+PROBE: NOT DETECTED — SIGNIFICANT CHANGE UP
FLUAV H3 RNA SPEC QL NAA+PROBE: NOT DETECTED — SIGNIFICANT CHANGE UP
FLUAV SUBTYP SPEC NAA+PROBE: NOT DETECTED — SIGNIFICANT CHANGE UP
FLUBV RNA SPEC QL NAA+PROBE: NOT DETECTED — SIGNIFICANT CHANGE UP
GAS PNL BLDV: 132 MMOL/L — LOW (ref 136–146)
GIANT PLATELETS BLD QL SMEAR: PRESENT — SIGNIFICANT CHANGE UP
GLUCOSE BLDV-MCNC: 112 MG/DL — HIGH (ref 70–99)
GLUCOSE SERPL-MCNC: 111 MG/DL — HIGH (ref 70–99)
GLUCOSE UR-MCNC: NEGATIVE — SIGNIFICANT CHANGE UP
HADV DNA SPEC QL NAA+PROBE: NOT DETECTED — SIGNIFICANT CHANGE UP
HCO3 BLDV-SCNC: 23 MMOL/L — SIGNIFICANT CHANGE UP (ref 20–27)
HCOV PNL SPEC NAA+PROBE: SIGNIFICANT CHANGE UP
HCT VFR BLD CALC: 41.1 % — SIGNIFICANT CHANGE UP (ref 39–50)
HCT VFR BLDV CALC: 37.6 % — LOW (ref 39–51)
HGB BLD-MCNC: 13.2 G/DL — SIGNIFICANT CHANGE UP (ref 13–17)
HGB BLDV-MCNC: 12.2 G/DL — LOW (ref 13–17)
HMPV RNA SPEC QL NAA+PROBE: NOT DETECTED — SIGNIFICANT CHANGE UP
HPIV1 RNA SPEC QL NAA+PROBE: NOT DETECTED — SIGNIFICANT CHANGE UP
HPIV2 RNA SPEC QL NAA+PROBE: NOT DETECTED — SIGNIFICANT CHANGE UP
HPIV3 RNA SPEC QL NAA+PROBE: NOT DETECTED — SIGNIFICANT CHANGE UP
HPIV4 RNA SPEC QL NAA+PROBE: NOT DETECTED — SIGNIFICANT CHANGE UP
HYALINE CASTS # UR AUTO: SIGNIFICANT CHANGE UP
IMM GRANULOCYTES NFR BLD AUTO: 3.7 % — HIGH (ref 0–1.5)
KETONES UR-MCNC: NEGATIVE — SIGNIFICANT CHANGE UP
LACTATE BLDV-MCNC: 1.9 MMOL/L — SIGNIFICANT CHANGE UP (ref 0.5–2)
LEUKOCYTE ESTERASE UR-ACNC: NEGATIVE — SIGNIFICANT CHANGE UP
LYMPHOCYTES # BLD AUTO: 0.98 K/UL — LOW (ref 1–3.3)
LYMPHOCYTES # BLD AUTO: 8.5 % — LOW (ref 13–44)
LYMPHOCYTES NFR SPEC AUTO: 10.9 % — LOW (ref 13–44)
MCHC RBC-ENTMCNC: 30.1 PG — SIGNIFICANT CHANGE UP (ref 27–34)
MCHC RBC-ENTMCNC: 32.1 % — SIGNIFICANT CHANGE UP (ref 32–36)
MCV RBC AUTO: 93.8 FL — SIGNIFICANT CHANGE UP (ref 80–100)
METAMYELOCYTES # FLD: 0.9 % — SIGNIFICANT CHANGE UP (ref 0–1)
MONOCYTES # BLD AUTO: 0.62 K/UL — SIGNIFICANT CHANGE UP (ref 0–0.9)
MONOCYTES NFR BLD AUTO: 5.4 % — SIGNIFICANT CHANGE UP (ref 2–14)
MONOCYTES NFR BLD: 5.5 % — SIGNIFICANT CHANGE UP (ref 2–9)
MYELOCYTES NFR BLD: 0 % — SIGNIFICANT CHANGE UP (ref 0–0)
NEUTROPHIL AB SER-ACNC: 80 % — HIGH (ref 43–77)
NEUTROPHILS # BLD AUTO: 9.39 K/UL — HIGH (ref 1.8–7.4)
NEUTROPHILS NFR BLD AUTO: 81.9 % — HIGH (ref 43–77)
NEUTS BAND # BLD: 1.8 % — SIGNIFICANT CHANGE UP (ref 0–6)
NITRITE UR-MCNC: NEGATIVE — SIGNIFICANT CHANGE UP
NRBC # FLD: 0 K/UL — SIGNIFICANT CHANGE UP (ref 0–0)
OTHER - HEMATOLOGY %: 0 — SIGNIFICANT CHANGE UP
PCO2 BLDV: 35 MMHG — LOW (ref 41–51)
PH BLDV: 7.42 PH — SIGNIFICANT CHANGE UP (ref 7.32–7.43)
PH UR: 6 — SIGNIFICANT CHANGE UP (ref 5–8)
PLATELET # BLD AUTO: 132 K/UL — LOW (ref 150–400)
PLATELET COUNT - ESTIMATE: SIGNIFICANT CHANGE UP
PMV BLD: 11.4 FL — SIGNIFICANT CHANGE UP (ref 7–13)
PO2 BLDV: 47 MMHG — HIGH (ref 35–40)
POLYCHROMASIA BLD QL SMEAR: SLIGHT — SIGNIFICANT CHANGE UP
POTASSIUM BLDV-SCNC: 3.4 MMOL/L — SIGNIFICANT CHANGE UP (ref 3.4–4.5)
POTASSIUM SERPL-MCNC: SIGNIFICANT CHANGE UP MMOL/L (ref 3.5–5.3)
POTASSIUM SERPL-SCNC: SIGNIFICANT CHANGE UP MMOL/L (ref 3.5–5.3)
PROMYELOCYTES # FLD: 0 % — SIGNIFICANT CHANGE UP (ref 0–0)
PROT SERPL-MCNC: 7.8 G/DL — SIGNIFICANT CHANGE UP (ref 6–8.3)
PROT UR-MCNC: 600 — HIGH
RBC # BLD: 4.38 M/UL — SIGNIFICANT CHANGE UP (ref 4.2–5.8)
RBC # FLD: 13.7 % — SIGNIFICANT CHANGE UP (ref 10.3–14.5)
RBC CASTS # UR COMP ASSIST: >50 — HIGH (ref 0–?)
REVIEW TO FOLLOW: YES — SIGNIFICANT CHANGE UP
RSV RNA SPEC QL NAA+PROBE: NOT DETECTED — SIGNIFICANT CHANGE UP
RV+EV RNA SPEC QL NAA+PROBE: NOT DETECTED — SIGNIFICANT CHANGE UP
SAO2 % BLDV: 81.1 % — SIGNIFICANT CHANGE UP (ref 60–85)
SMUDGE CELLS # BLD: PRESENT — SIGNIFICANT CHANGE UP
SODIUM SERPL-SCNC: 134 MMOL/L — LOW (ref 135–145)
SP GR SPEC: 1.04 — SIGNIFICANT CHANGE UP (ref 1–1.04)
SQUAMOUS # UR AUTO: SIGNIFICANT CHANGE UP
TROPONIN T, HIGH SENSITIVITY: 8 NG/L — SIGNIFICANT CHANGE UP (ref ?–14)
TROPONIN T, HIGH SENSITIVITY: 9 NG/L — SIGNIFICANT CHANGE UP (ref ?–14)
UROBILINOGEN FLD QL: SIGNIFICANT CHANGE UP
VARIANT LYMPHS # BLD: 0 % — SIGNIFICANT CHANGE UP
WBC # BLD: 11.47 K/UL — HIGH (ref 3.8–10.5)
WBC # FLD AUTO: 11.47 K/UL — HIGH (ref 3.8–10.5)
WBC UR QL: SIGNIFICANT CHANGE UP (ref 0–?)

## 2019-11-22 PROCEDURE — 99223 1ST HOSP IP/OBS HIGH 75: CPT

## 2019-11-22 PROCEDURE — 99222 1ST HOSP IP/OBS MODERATE 55: CPT

## 2019-11-22 PROCEDURE — 71046 X-RAY EXAM CHEST 2 VIEWS: CPT | Mod: 26

## 2019-11-22 RX ORDER — CEFTRIAXONE 500 MG/1
1000 INJECTION, POWDER, FOR SOLUTION INTRAMUSCULAR; INTRAVENOUS ONCE
Refills: 0 | Status: COMPLETED | OUTPATIENT
Start: 2019-11-22 | End: 2019-11-22

## 2019-11-22 RX ORDER — VITAMIN E 100 UNIT
400 CAPSULE ORAL DAILY
Refills: 0 | Status: DISCONTINUED | OUTPATIENT
Start: 2019-11-22 | End: 2019-11-27

## 2019-11-22 RX ORDER — COLLAGENASE CLOSTRIDIUM HIST. 250 UNIT/G
1 OINTMENT (GRAM) TOPICAL DAILY
Refills: 0 | Status: DISCONTINUED | OUTPATIENT
Start: 2019-11-22 | End: 2019-11-27

## 2019-11-22 RX ORDER — VITAMIN E 100 UNIT
0 CAPSULE ORAL
Qty: 0 | Refills: 0 | DISCHARGE

## 2019-11-22 RX ORDER — APIXABAN 2.5 MG/1
5 TABLET, FILM COATED ORAL EVERY 12 HOURS
Refills: 0 | Status: DISCONTINUED | OUTPATIENT
Start: 2019-11-22 | End: 2019-11-24

## 2019-11-22 RX ORDER — IBUPROFEN 200 MG
600 TABLET ORAL ONCE
Refills: 0 | Status: COMPLETED | OUTPATIENT
Start: 2019-11-22 | End: 2019-11-22

## 2019-11-22 RX ORDER — AZITHROMYCIN 500 MG/1
500 TABLET, FILM COATED ORAL EVERY 24 HOURS
Refills: 0 | Status: DISCONTINUED | OUTPATIENT
Start: 2019-11-23 | End: 2019-11-24

## 2019-11-22 RX ORDER — SODIUM CHLORIDE 9 MG/ML
1000 INJECTION INTRAMUSCULAR; INTRAVENOUS; SUBCUTANEOUS
Refills: 0 | Status: DISCONTINUED | OUTPATIENT
Start: 2019-11-22 | End: 2019-11-24

## 2019-11-22 RX ORDER — DILTIAZEM HCL 120 MG
60 CAPSULE, EXT RELEASE 24 HR ORAL EVERY 6 HOURS
Refills: 0 | Status: DISCONTINUED | OUTPATIENT
Start: 2019-11-22 | End: 2019-11-27

## 2019-11-22 RX ORDER — CEFTRIAXONE 500 MG/1
1000 INJECTION, POWDER, FOR SOLUTION INTRAMUSCULAR; INTRAVENOUS EVERY 24 HOURS
Refills: 0 | Status: DISCONTINUED | OUTPATIENT
Start: 2019-11-23 | End: 2019-11-24

## 2019-11-22 RX ORDER — ACETAMINOPHEN 500 MG
650 TABLET ORAL ONCE
Refills: 0 | Status: COMPLETED | OUTPATIENT
Start: 2019-11-22 | End: 2019-11-22

## 2019-11-22 RX ORDER — AZITHROMYCIN 500 MG/1
500 TABLET, FILM COATED ORAL ONCE
Refills: 0 | Status: COMPLETED | OUTPATIENT
Start: 2019-11-22 | End: 2019-11-22

## 2019-11-22 RX ORDER — ASPIRIN/CALCIUM CARB/MAGNESIUM 324 MG
81 TABLET ORAL DAILY
Refills: 0 | Status: DISCONTINUED | OUTPATIENT
Start: 2019-11-22 | End: 2019-11-27

## 2019-11-22 RX ORDER — GUAIFENESIN/DEXTROMETHORPHAN 600MG-30MG
10 TABLET, EXTENDED RELEASE 12 HR ORAL EVERY 6 HOURS
Refills: 0 | Status: DISCONTINUED | OUTPATIENT
Start: 2019-11-22 | End: 2019-11-27

## 2019-11-22 RX ORDER — SODIUM CHLORIDE 9 MG/ML
1000 INJECTION INTRAMUSCULAR; INTRAVENOUS; SUBCUTANEOUS ONCE
Refills: 0 | Status: COMPLETED | OUTPATIENT
Start: 2019-11-22 | End: 2019-11-22

## 2019-11-22 RX ORDER — DILTIAZEM HCL 120 MG
5 CAPSULE, EXT RELEASE 24 HR ORAL
Qty: 125 | Refills: 0 | Status: DISCONTINUED | OUTPATIENT
Start: 2019-11-22 | End: 2019-11-22

## 2019-11-22 RX ORDER — ACETAMINOPHEN 500 MG
650 TABLET ORAL EVERY 6 HOURS
Refills: 0 | Status: DISCONTINUED | OUTPATIENT
Start: 2019-11-22 | End: 2019-11-27

## 2019-11-22 RX ORDER — DILTIAZEM HCL 120 MG
30 CAPSULE, EXT RELEASE 24 HR ORAL EVERY 6 HOURS
Refills: 0 | Status: DISCONTINUED | OUTPATIENT
Start: 2019-11-22 | End: 2019-11-22

## 2019-11-22 RX ORDER — DILTIAZEM HCL 120 MG
10 CAPSULE, EXT RELEASE 24 HR ORAL ONCE
Refills: 0 | Status: COMPLETED | OUTPATIENT
Start: 2019-11-22 | End: 2019-11-22

## 2019-11-22 RX ADMIN — CEFTRIAXONE 100 MILLIGRAM(S): 500 INJECTION, POWDER, FOR SOLUTION INTRAMUSCULAR; INTRAVENOUS at 04:34

## 2019-11-22 RX ADMIN — Medication 30 MILLIGRAM(S): at 16:17

## 2019-11-22 RX ADMIN — Medication 400 INTERNATIONAL UNIT(S): at 14:18

## 2019-11-22 RX ADMIN — Medication 650 MILLIGRAM(S): at 22:31

## 2019-11-22 RX ADMIN — Medication 600 MILLIGRAM(S): at 06:15

## 2019-11-22 RX ADMIN — Medication 30 MILLIGRAM(S): at 10:01

## 2019-11-22 RX ADMIN — Medication 650 MILLIGRAM(S): at 23:15

## 2019-11-22 RX ADMIN — Medication 650 MILLIGRAM(S): at 05:15

## 2019-11-22 RX ADMIN — Medication 60 MILLIGRAM(S): at 22:32

## 2019-11-22 RX ADMIN — SODIUM CHLORIDE 100 MILLILITER(S): 9 INJECTION INTRAMUSCULAR; INTRAVENOUS; SUBCUTANEOUS at 15:01

## 2019-11-22 RX ADMIN — APIXABAN 5 MILLIGRAM(S): 2.5 TABLET, FILM COATED ORAL at 16:17

## 2019-11-22 RX ADMIN — Medication 650 MILLIGRAM(S): at 06:15

## 2019-11-22 RX ADMIN — SODIUM CHLORIDE 1000 MILLILITER(S): 9 INJECTION INTRAMUSCULAR; INTRAVENOUS; SUBCUTANEOUS at 03:37

## 2019-11-22 RX ADMIN — Medication 600 MILLIGRAM(S): at 05:15

## 2019-11-22 RX ADMIN — Medication 10 MILLIGRAM(S): at 10:01

## 2019-11-22 RX ADMIN — Medication 5 MG/HR: at 17:04

## 2019-11-22 RX ADMIN — SODIUM CHLORIDE 1000 MILLILITER(S): 9 INJECTION INTRAMUSCULAR; INTRAVENOUS; SUBCUTANEOUS at 05:15

## 2019-11-22 RX ADMIN — AZITHROMYCIN 500 MILLIGRAM(S): 500 TABLET, FILM COATED ORAL at 04:31

## 2019-11-22 RX ADMIN — Medication 81 MILLIGRAM(S): at 14:09

## 2019-11-22 NOTE — ED ADULT NURSE NOTE - OBJECTIVE STATEMENT
Pt. brought into trauma A, a&ox3, ambulatory, and self-care. Pt. presents with a non-productive cough and states that he went to an urgent care where he had an X-Ray and was told that he had pneumonia and to come to the Emergency Department for antibiotics. Pt. states that he has pain on his left side when he coughs and presents with SOB, fever, chills, and HA. Pt. denies any n/v, diarrhea, or chest pain. Pt. states that he has a loss of appetite since Monday and hasn't been able to eat anything. Pt. states that he was tested for the flu at the urgent care and tested negative. Pt. tachy in HR 140s. Connected to cardiac monitor. Pt. is asymptomatic and states that he doesn't feel his heart beating fast. RR even and unlabored. 20 G to the right hand. Meds given per MD order. Pt. in no apparent distress. Awaiting further orders. Will continue to monitor.

## 2019-11-22 NOTE — H&P ADULT - ASSESSMENT
64 year old male with no significant past medical history presenting for left sided chest pain, cough and SOB for the past 4 days. CXR consistent with left upper and lower lobe pneumonia. Atrial flutter found on EKG with HR in 140's. 64 year old male with no significant past medical history presenting for left sided chest pain, cough and SOB for the past 4 days. CXR consistent with left upper and lower lobe multifocal community aquired pneumonia. Atrial flutter found on EKG with HR in 140's.

## 2019-11-22 NOTE — CONSULT NOTE ADULT - ASSESSMENT
64 year old male with no significant past medical history presented with SOB, fever and productive cough. CXR shows multifocal pneumonia. + leukocytosis. He is started on IV antibiotics. Found to have Atrial fibrillation with RVR up to 150s. Currently VR 110s-120s after IV Cardizem and PO Cardizem. [id number] score=0  - Continue current management with PO Cardizem. Increase dose as BP tolerates  - Possible OMI/DCCV on Monday if patient is stable 64 year old male with no significant past medical history presented with SOB, fever and productive cough. CXR shows multifocal pneumonia. + leukocytosis. He is started on IV antibiotics. Found to have Atrial fibrillation with RVR up to 150s. Currently VR 110s-120s after IV Cardizem and PO Cardizem. [id number] score=0. Risk for Stroke ~1%/year. Discussed with Dr. Ruiz.   - Continue current management with PO Cardizem. Increase dose as BP tolerates  - Start  apixaban 5mg BID for systemic anticoagulation  - Possible OMI/DCCV on Monday if patient is stable  - Continue other management as per primary team

## 2019-11-22 NOTE — H&P ADULT - PROBLEM SELECTOR PLAN 3
Possibly related to pneumonia.   Consider urology consult for bladder/kidney US vs. cystoscopy Continue wearing boot and Collagenase santyl cream

## 2019-11-22 NOTE — ED PROVIDER NOTE - OBJECTIVE STATEMENT
64M no pmh p/w myalgias, productive cough, L chest pain, fever x5d. was seen at urgent care today where he was told he had L sided PNA and come to ED for abx. Had flu swab there and was negative. was febrile at urgent care and given tylenol, no abx given[place] denies sob. also reporting red urine. denies back pain, urianry symptoms.

## 2019-11-22 NOTE — ED PROVIDER NOTE - CARE PLAN
Principal Discharge DX:	Pneumonia  Secondary Diagnosis:	Atrial flutter  Secondary Diagnosis:	Atrial flutter with rapid ventricular response

## 2019-11-22 NOTE — ED PROVIDER NOTE - CLINICAL SUMMARY MEDICAL DECISION MAKING FREE TEXT BOX
64M no pmh p/w myalgias, productive cough, L chest pain, fever x5d. s/s suggestive of viral syndrome v pna. will r/o rhabdo given red color of urine and myalgias. Bib att: 63 yo M no pmh p/w myalgias, productive cough, L chest pain, fever x5d. s/s suggestive of viral syndrome v pna. will r/o rhabdo given red color of urine and myalgias.

## 2019-11-22 NOTE — H&P ADULT - PROBLEM SELECTOR PLAN 6
1.  Name of PCP: Dr Umana  2.  PCP Contacted on Admission: [ ] Y    [ x] N    3.  PCP contacted at Discharge: [ ] Y    [ ] N    [ ] N/A  4.  Post-Discharge Appointment Date and Location:  5.  Summary of Handoff given to PCP:

## 2019-11-22 NOTE — H&P ADULT - PROBLEM SELECTOR PLAN 1
CXR consistent with left upper and lower lung field pneumonia  Azithromycin and Ceftriaxone started  Tylenol and ibuprofen for chest pain Multifocal community acquired PNA  CXR consistent with left upper and lower lung field pneumonia  Azithromycin and Ceftriaxone started  Blood cultures P  CXR: Patchy opacities throughout the left lung consistent with   multifocal pneumonia. Multifocal community acquired PNA  CXR consistent with left upper and lower lung field pneumonia  Azithromycin and Ceftriaxone started  Blood cultures P  CXR: Patchy opacities throughout the left lung consistent with   multifocal pneumonia.  case to be  dw Dr Ervin- hospitalists

## 2019-11-22 NOTE — H&P ADULT - PROBLEM SELECTOR PLAN 2
Telemetry monitoring, serial EKGs, TTE, cardiac enzymes  HR on admission in 60's. A. flutter possibly driven by infection- wait to treat until pneumonia starts to resolve Telemetry monitoring, serial EKGs, TTE, cardiac enzymes  HR on admission in 60's. With a. flutter in 140's- Rate control as needed after pneumonia starts to clear.  CHADVASc score 0 New a-flutter RVR  trop=8-->  ekg:  aflutter with variable av block @ 130 bpm  Telemetry monitoring, serial EKGs, TTE, cardiac enzymes  HR on admission in 60's. With a. flutter in 140's- Rate control as needed after pneumonia starts to clear.  CHADVASc score 0  starts asa. consider heparin if need OMI DCCV  Start cardizem for rate control  consider cardiology consullt and eventual ischemia work up New a-flutter RVR  trop=8-->  ekg:  aflutter with variable av block @ 130 bpm  Telemetry monitoring, serial EKGs, TTE, cardiac enzymes  HR on admission in 60's. With a. flutter in 140's- Rate control as needed after pneumonia starts to clear.  CHADVASc score 0  starts asa. consider heparin if need OMI DCCV  will give cardizem for rate control IV and PO  Start cardizem for rate control  consider cardiology consullt and eventual ischemia work up

## 2019-11-22 NOTE — ED ADULT NURSE REASSESSMENT NOTE - NS ED NURSE REASSESS COMMENT FT1
report endorsed to essu 2 rn, patient apepars comfortable and in no distress, texting on cell phone, respirations are even and unlabored, spo2 100% on room air, patient updated regarding plan of care.

## 2019-11-22 NOTE — PATIENT PROFILE ADULT - ARRIVAL FROM
Progress Notes by America Luther RMA at 02/09/18 11:53 AM     Author:  America Luther RMA Service:  (none) Author Type:  Certified Medical Assistant     Filed:  02/09/18 11:53 AM Encounter Date:  2/9/2018 Status:  Signed     :  America Luther RMA (Certified Medical Assistant)       From: Zo Medrano  Sent: 2/9/2018 11:37 AM CST  Subject: Appointment Request    Appointment Request From: Zo Medrano    With Provider: Mara Burns LCSW [[facility], [facility]. - Social   Worker / [place]]    Would Accept With:No one else    Preferred Date Range: From 2/9/2018 To 3/2/2018    Preferred Times: Any    Reason for visit: Office Visit    Comments:  I would like to schedule an appointment to see Mara. I made an   appointment for my daughter and noticed that she has openings in [place] on   Friday March 2nd. If any of these are still available after 9am, please   let me know.    Thank you       Revision History        Date/Time User Provider Type Action    > 02/09/18 11:53 AM America Luther RMA Certified Medical Assistant Sign    Attribution information within the note text is not available.             Home

## 2019-11-22 NOTE — H&P ADULT - NEGATIVE NEUROLOGICAL SYMPTOMS
no paresthesias/no headache/no confusion/no syncope/no loss of consciousness/no hemiparesis/no transient paralysis/no loss of sensation/no difficulty walking/no weakness/no generalized seizures/no focal seizures

## 2019-11-22 NOTE — ED ADULT NURSE NOTE - NSIMPLEMENTINTERV_GEN_ALL_ED
Implemented All Universal Safety Interventions:  [place] to call system. Call bell, personal items and telephone within reach. Instruct patient to call for assistance. Room bathroom lighting operational. Non-slip footwear when patient is off stretcher. Physically safe environment: no spills, clutter or unnecessary equipment. Stretcher in lowest position, wheels locked, appropriate side rails in place.

## 2019-11-22 NOTE — H&P ADULT - NEGATIVE GENERAL GENITOURINARY SYMPTOMS
no urinary hesitancy/no renal colic/no flank pain R/no dysuria/no nocturia/normal urinary frequency/no incontinence/no flank pain L

## 2019-11-22 NOTE — H&P ADULT - NEGATIVE OPHTHALMOLOGIC SYMPTOMS
no blurred vision L/no blurred vision R/no diplopia/no photophobia/no irritation R/no lacrimation L/no lacrimation R/no irritation L/no loss of vision R/no loss of vision L

## 2019-11-22 NOTE — H&P ADULT - NEUROLOGICAL DETAILS
no spontaneous movement/normal strength/alert and oriented x 3/responds to pain/responds to verbal commands/sensation intact/cranial nerves intact

## 2019-11-22 NOTE — H&P ADULT - HISTORY OF PRESENT ILLNESS
64 year old male with no significant past medical history presenting for left sided chest pain and cough for the past 4 days. Patient reports diffuse left sided chest and midaxillary pain at rest. The pain is sharp, nonradiating. Not reproducible, not exertional, not associated with food. Pain was a 9/10. He has also had a productive cough with white sputum and SOB for the past 4 days. SOB worse with lying flat and walking. Nothing makes the cough better or worse. Admits associated symptoms of fever, chills, diaphoresis, myalgias, nausea, loss of appetite and red urine all starting 4 days ago. Patient reports going to an urgent care yesterday. They told him he had pneumonia and sent him to the ED to get antibiotics. Denies PND, orthopnea, palpitations, lightheadedness, dizziness, vision changes, syncope, increased lower extremity edema, anorexia, weight changes (loss or gain), nightsweats, generalized fatigue, abdominal pain, V/C/D BRBPR, melena, urinary frequency, dysuria, back pain and wheezing. 64 year old male with no significant past medical history presenting for left sided chest pain and cough for the past 4 days. Patient reports diffuse left sided chest and midaxillary pain at rest. The pain is sharp, non-radiating. Not reproducible, not exertional, not associated with food. Pain was a 6/10 lasting for and intermittent since onset. . He has also had a productive cough with white sputum and SOB for the past 4 days. SOB worse with lying flat and walking. Nothing makes the cough better or worse. Admits associated symptoms of fever, chills, diaphoresis, myalgias, nausea, loss of appetite and red urine all starting 4 days ago. Patient reports going to an urgent care yesterday. They told him he had pneumonia and sent him to the ED to get antibiotics. Denies PND, orthopnea, palpitations, lightheadedness, dizziness, vision changes, syncope, increased lower extremity edema, anorexia, weight changes (loss or gain), night sweats, generalized fatigue, abdominal pain, V/C/D BRBPR, melena, urinary frequency, dysuria, back pain and wheezing.

## 2019-11-22 NOTE — H&P ADULT - PROBLEM SELECTOR PLAN 4
Continue wearing boot and Collagenase santyl cream Treat if patient becomes symptomatic Treat if patient becomes symptomatic  check urine culture

## 2019-11-22 NOTE — ED ADULT NURSE REASSESSMENT NOTE - NS ED NURSE REASSESS COMMENT FT1
pt started on cardizen gtt, c/o right sided rib cage pain, appears comfortable texting on cell phone, resps even and unlabored will ctm

## 2019-11-22 NOTE — ED ADULT TRIAGE NOTE - CHIEF COMPLAINT QUOTE
Pt c/o fever, cough, chills, body aches, sore throat, SOB, abdominal pain, nausea, loss of appetite, and sweating since Monday. Pt states he went to his local urgent care center this afternoon and was told he has PNA and to go to the ER for IV antibiotics. Pt denies chest pain.

## 2019-11-22 NOTE — H&P ADULT - GASTROINTESTINAL DETAILS
no distention/bowel sounds normal/no rigidity/no organomegaly/no rebound tenderness/soft/nontender/normal/no masses palpable/no bruit/no guarding

## 2019-11-22 NOTE — H&P ADULT - ATTENDING COMMENTS
63 yo m with no sig PMH other than recent admission for foot ulcer for fever and cough found to have L sided multifocal PNA as well as new onset Aflutter with RVR likely driven by infection  -cont abx for CAP. f/u cxs   -start Cardizem for rate control  -EP consulted, possible OMI/Cardioversion on Mon. will start apixaban for AC  -f/u TTE. ischemic w/u if reduced EF. currently low suspicion for ACS  -wound care for L foot ulcer 65 yo m with no sig PMH other than recent admission for foot ulcer p/w fever and cough found to have sepsis 2/2 L sided multifocal PNA as well as new onset Aflutter with RVR likely driven by infection  -met sepsis criteria based on fever, leukocytosis, tachycardia. will start abx for CAP. f/u cxs   -start Cardizem for rate control  -EP consulted, possible OMI/Cardioversion on Mon. will start apixaban for AC  -f/u TTE. ischemic w/u if reduced EF. currently low suspicion for ACS  -wound care for L foot ulcer

## 2019-11-22 NOTE — H&P ADULT - NSHPSOCIALHISTORY_GEN_ALL_CORE
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 Admits to occasional tobacco use for 15 years. Quit smoking 30 years ago. Admits to social alcohol use on the weekends. Denies illicit drug use.

## 2019-11-22 NOTE — H&P ADULT - RS GEN PE MLT RESP DETAILS PC
no wheezes/rales/no chest wall tenderness/airway patent/no rhonchi/no subcutaneous emphysema/no intercostal retractions clear to auscultation bilaterally/no chest wall tenderness/normal/airway patent/no subcutaneous emphysema/breath sounds equal/good air movement/no intercostal retractions/respirations non-labored/no rales/no wheezes/no rhonchi/rales

## 2019-11-22 NOTE — ED PROVIDER NOTE - NS ED ROS FT
Gen: +fever  Eyes: No eye irritation or discharge  ENT: No earpain, congestion, sore throat  Resp: +cough, no sob  Cardiovascular: +L chest pain  Gastroenteric: No nausea/vomiting, diarrhea, constipation  : No dysuria  MS: +myalgias  Skin: No rashes  Neuro: No headache  Remainder negative, except as per the HPI

## 2019-11-22 NOTE — ED ADULT NURSE REASSESSMENT NOTE - NS ED NURSE REASSESS COMMENT FT1
Patient HR observed increasing to 180s and fluctuating, monitor shows irregular rhythm. PO standing cardizem administered , Tele JANNET Reyes made aware and repeat ekg will be done. Will ctm. Patient endorsing muscular pain to upper abdomen when coughing, denies CP/SOB.

## 2019-11-22 NOTE — CONSULT NOTE ADULT - SUBJECTIVE AND OBJECTIVE BOX
CHIEF COMPLAINT: Called to evaluate patient with Atrial fibrillation/atrial flutter with RVR    HISTORY OF PRESENT ILLNESS:  64 year old male with no significant past medical history presenting for left sided chest pain and cough since Monday.  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 1000 milliLiter(s) IV Continuous <Continuous>  vitamin E 400 International Unit(s) Oral daily      FAMILY HISTORY:  Family history of COPD (chronic obstructive pulmonary disease): Mother      SOCIAL HISTORY:      [ ] Smoker  [ ] Alcohol  [ ] Drugs    Allergies    No Known Allergies    Intolerances    	    REVIEW OF SYSTEMS:  CONSTITUTIONAL: No fever, weight loss, or fatigue  EYES: No eye pain, visual disturbances, or discharge  ENMT:  No difficulty hearing, tinnitus, vertigo; No sinus or throat pain  NECK: No pain or stiffness  RESPIRATORY: No cough, wheezing, chills or hemoptysis; No Shortness of Breath  CARDIOVASCULAR: No chest pain, palpitations, passing out, dizziness, or leg swelling  GASTROINTESTINAL: No abdominal or epigastric pain. No nausea, vomiting, or hematemesis; No diarrhea or constipation. No melena or hematochezia.  GENITOURINARY: No dysuria, frequency, hematuria, or incontinence  NEUROLOGICAL: No headaches, memory loss, loss of strength, numbness, or tremors  SKIN: No itching, burning, rashes, or lesions   LYMPH Nodes: No enlarged glands  ENDOCRINE: No heat or cold intolerance; No hair loss  MUSCULOSKELETAL: No joint pain or swelling; No muscle, back, or extremity pain  PSYCHIATRIC: No depression, anxiety, mood swings, or difficulty sleeping  HEME/LYMPH: No easy bruising, or bleeding gums  ALLERY AND IMMUNOLOGIC: No hives or eczema	    [ ] All others negative	  [ ] Unable to obtain    PHYSICAL EXAM:  T(C): 37.2 (11-22-19 @ 11:46), Max: 38.6 (11-22-19 @ 05:31)  HR: 121 (11-22-19 @ 11:46) (63 - 146)  BP: 121/77 (11-22-19 @ 11:46) (101/79 - 165/132)  RR: 19 (11-22-19 @ 11:46) (19 - 20)  SpO2: 100% (11-22-19 @ 11:46) (96% - 100%)  Wt(kg): --  I&O's Summary      Appearance: Normal	  HEENT:   Normal oral mucosa, PERRL, EOMI	  Lymphatic: No lymphadenopathy  Cardiovascular: Normal S1 S2, No JVD, No murmurs, No edema  Respiratory: Lungs clear to auscultation	  Psychiatry: A & O x 3, Mood & affect appropriate  Gastrointestinal:  Soft, Non-tender, + BS	  Skin: No rashes, No ecchymoses, No cyanosis	  Neurologic: Non-focal  Extremities: Normal range of motion, No clubbing, cyanosis or edema  Vascular: Peripheral pulses palpable 2+ bilaterally    TELEMETRY: 	    ECG:  	  RADIOLOGY:  OTHER: 	  	  LABS:	 	    CARDIAC MARKERS:                                  13.2   11.47 )-----------( 132      ( 22 Nov 2019 04:10 )             41.1     11-22    134<L>  |  102  |  26<H>  ----------------------------<  111<H>  Test not performed SPECIMEN SEVERELY HEMOLYZED   |  21<L>  |  1.05    Ca    8.9      22 Nov 2019 04:10    TPro  7.8  /  Alb  3.0<L>  /  TBili  1.8<H>  /  DBili  x   /  AST  82<H>  /  ALT  35  /  AlkPhos  138<H>  11-22    proBNP:   Lipid Profile:   HgA1c:   TSH: CHIEF COMPLAINT: Called to evaluate patient with Atrial fibrillation/atrial flutter with RVR    HISTORY OF PRESENT ILLNESS:  64 year old male with no significant past medical history presented for left sided chest pain and cough since Monday.  Patient reports cold like symptoms over the last weekend. He started having productive cough with white sputum and SOB for the past 4 days. He also reports diffuse left sided chest and midaxillary pain at rest. His SOB was worse with lying flat and walking. He admits to associated symptoms of fever, chills, diaphoresis, myalgias, nausea, loss of appetite and red urine all starting 4 days ago. Patient reports going to an urgent care yesterday. They told him he had pneumonia and sent him to the ED to get IV antibiotics. EKG in ED revealed atrial fibrillation with VR up to 150s. Patient reports no associated palpitations or dizziness. He received IV Cardizem 10 mg x1and is started on PO Cardizem Telemetry now reveals atrial fibrillation with VR 100s-120s. Patient continues to complain of chills and productive cough. 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 1000 milliLiter(s) IV Continuous <Continuous>  vitamin E 400 International Unit(s) Oral daily      FAMILY HISTORY:  Family history of COPD (chronic obstructive pulmonary disease): Mother      SOCIAL HISTORY:      [-]Smoker  [-]Alcohol  [-]Drugs    Allergies    No Known Allergies    Intolerances    	    REVIEW OF SYSTEMS:  CONSTITUTIONAL: + Fever; + fatigue  EYES: No eye pain, visual disturbances, or discharge  ENMT:  No difficulty hearing, tinnitus, vertigo; No sinus or throat pain  NECK: No pain or stiffness  RESPIRATORY: + Cough, + chills, + shortness of breath  CARDIOVASCULAR: No chest pain, palpitations, passing out, dizziness, or leg swelling  GASTROINTESTINAL: + loss of appetite.   Genitourinary: + discolored urine  NEUROLOGICAL: No headaches, memory loss, loss of strength, numbness, or tremors  SKIN: No itching, burning, rashes, or lesions   LYMPH Nodes: No enlarged glands  ENDOCRINE: No heat or cold intolerance; No hair loss  MUSCULOSKELETAL: No joint pain or swelling; No muscle, back, or extremity pain  PSYCHIATRIC: No depression, anxiety, mood swings, or difficulty sleeping  HEME/LYMPH: No easy bruising, or bleeding gums  ALLERY AND IMMUNOLOGIC: No hives or eczema	    [X All others negative	  [ ] Unable to obtain    PHYSICAL EXAM:  T(C): 37.2 (11-22-19 @ 11:46), Max: 38.6 (11-22-19 @ 05:31)  HR: 121 (11-22-19 @ 11:46) (63 - 146)  BP: 121/77 (11-22-19 @ 11:46) (101/79 - 165/132)  RR: 19 (11-22-19 @ 11:46) (19 - 20)  SpO2: 100% (11-22-19 @ 11:46) (96% - 100%)  Wt(kg): --  I&O's Summary      Appearance: Normal	  HEENT:   Normal oral mucosa, PERRL, EOMI	  Lymphatic: No lymphadenopathy  Cardiovascular: Irregular S1 S2;   No edema  Respiratory: Lungs with diminished  BS @ bases  Psychiatry: A & O x 3, Mood & affect appropriate  Gastrointestinal:  Soft, Non-tender, + BS	  Skin: No rashes, No ecchymoses, No cyanosis	  Neurologic: Non-focal  Extremities: Normal range of motion, No clubbing, cyanosis or edema  Vascular: Peripheral pulses palpable 2+ bilaterally    TELEMETRY: Atrial fibrillation with VR 110s-120s  ECG:  	Atrial fibrillation with  bpm  RADIOLOGY:  OTHER: 	  	  LABS:	 	    CARDIAC MARKERS:                          13.2   11.47 )-----------( 132      ( 22 Nov 2019 04:10 )             41.1     11-22    134<L>  |  102  |  26<H>  ----------------------------<  111<H>  Test not performed SPECIMEN SEVERELY HEMOLYZED   |  21<L>  |  1.05    Ca    8.9      22 Nov 2019 04:10    TPro  7.8  /  Alb  3.0<L>  /  TBili  1.8<H>  /  DBili  x   /  AST  82<H>  /  ALT  35  /  AlkPhos  138<H>  11-22      TSH: Pending

## 2019-11-22 NOTE — ED PROVIDER NOTE - PHYSICAL EXAMINATION
Vitals: WNL  Gen: laying comfortably in NAD  Head: NCAT  ENT: sclerae white, anicterus, moist mucous membranes. No exudates.   CV: RRR. Audible S1 and S2  Pulm: crackles L base, remainder ctab  Abd: soft, normoactive BS x4, NTND, no rebound, no guarding, no rashes  Musculoskeletal:  No peripheral edema  Skin: no lesions or scars noted  Neurologic: AAOx3  : no CVA tenderness  Psych: normal affect

## 2019-11-23 LAB
ANION GAP SERPL CALC-SCNC: 12 MMO/L — SIGNIFICANT CHANGE UP (ref 7–14)
APTT BLD: 35.3 SEC — SIGNIFICANT CHANGE UP (ref 27.5–36.3)
BASOPHILS # BLD AUTO: 0.04 K/UL — SIGNIFICANT CHANGE UP (ref 0–0.2)
BASOPHILS NFR BLD AUTO: 0.3 % — SIGNIFICANT CHANGE UP (ref 0–2)
BUN SERPL-MCNC: 27 MG/DL — HIGH (ref 7–23)
CALCIUM SERPL-MCNC: 8.7 MG/DL — SIGNIFICANT CHANGE UP (ref 8.4–10.5)
CHLORIDE SERPL-SCNC: 106 MMOL/L — SIGNIFICANT CHANGE UP (ref 98–107)
CHOLEST SERPL-MCNC: 96 MG/DL — LOW (ref 120–199)
CO2 SERPL-SCNC: 20 MMOL/L — LOW (ref 22–31)
CREAT SERPL-MCNC: 0.96 MG/DL — SIGNIFICANT CHANGE UP (ref 0.5–1.3)
EOSINOPHIL # BLD AUTO: 0.35 K/UL — SIGNIFICANT CHANGE UP (ref 0–0.5)
EOSINOPHIL NFR BLD AUTO: 2.7 % — SIGNIFICANT CHANGE UP (ref 0–6)
GLUCOSE SERPL-MCNC: 111 MG/DL — HIGH (ref 70–99)
HBA1C BLD-MCNC: 5.2 % — SIGNIFICANT CHANGE UP (ref 4–5.6)
HCT VFR BLD CALC: 38.9 % — LOW (ref 39–50)
HDLC SERPL-MCNC: 19 MG/DL — LOW (ref 35–55)
HGB BLD-MCNC: 12.5 G/DL — LOW (ref 13–17)
IMM GRANULOCYTES NFR BLD AUTO: 9.5 % — HIGH (ref 0–1.5)
INR BLD: 1.39 — HIGH (ref 0.88–1.17)
LIPID PNL WITH DIRECT LDL SERPL: 49 MG/DL — SIGNIFICANT CHANGE UP
LYMPHOCYTES # BLD AUTO: 1.67 K/UL — SIGNIFICANT CHANGE UP (ref 1–3.3)
LYMPHOCYTES # BLD AUTO: 13.1 % — SIGNIFICANT CHANGE UP (ref 13–44)
MAGNESIUM SERPL-MCNC: 2.3 MG/DL — SIGNIFICANT CHANGE UP (ref 1.6–2.6)
MANUAL SMEAR VERIFICATION: SIGNIFICANT CHANGE UP
MCHC RBC-ENTMCNC: 30.3 PG — SIGNIFICANT CHANGE UP (ref 27–34)
MCHC RBC-ENTMCNC: 32.1 % — SIGNIFICANT CHANGE UP (ref 32–36)
MCV RBC AUTO: 94.2 FL — SIGNIFICANT CHANGE UP (ref 80–100)
MONOCYTES # BLD AUTO: 1.03 K/UL — HIGH (ref 0–0.9)
MONOCYTES NFR BLD AUTO: 8.1 % — SIGNIFICANT CHANGE UP (ref 2–14)
NEUTROPHILS # BLD AUTO: 8.43 K/UL — HIGH (ref 1.8–7.4)
NEUTROPHILS NFR BLD AUTO: 66.3 % — SIGNIFICANT CHANGE UP (ref 43–77)
NRBC # FLD: 0 K/UL — SIGNIFICANT CHANGE UP (ref 0–0)
PHOSPHATE SERPL-MCNC: 3.2 MG/DL — SIGNIFICANT CHANGE UP (ref 2.5–4.5)
PLATELET # BLD AUTO: 196 K/UL — SIGNIFICANT CHANGE UP (ref 150–400)
PMV BLD: 11.8 FL — SIGNIFICANT CHANGE UP (ref 7–13)
POTASSIUM SERPL-MCNC: 3.7 MMOL/L — SIGNIFICANT CHANGE UP (ref 3.5–5.3)
POTASSIUM SERPL-SCNC: 3.7 MMOL/L — SIGNIFICANT CHANGE UP (ref 3.5–5.3)
PROTHROM AB SERPL-ACNC: 16 SEC — HIGH (ref 9.8–13.1)
RBC # BLD: 4.13 M/UL — LOW (ref 4.2–5.8)
RBC # FLD: 14.1 % — SIGNIFICANT CHANGE UP (ref 10.3–14.5)
SODIUM SERPL-SCNC: 138 MMOL/L — SIGNIFICANT CHANGE UP (ref 135–145)
SPECIMEN SOURCE: SIGNIFICANT CHANGE UP
SPECIMEN SOURCE: SIGNIFICANT CHANGE UP
TRIGL SERPL-MCNC: 112 MG/DL — SIGNIFICANT CHANGE UP (ref 10–149)
TSH SERPL-MCNC: 1.43 UIU/ML — SIGNIFICANT CHANGE UP (ref 0.27–4.2)
WBC # BLD: 12.73 K/UL — HIGH (ref 3.8–10.5)
WBC # FLD AUTO: 12.73 K/UL — HIGH (ref 3.8–10.5)

## 2019-11-23 PROCEDURE — 99233 SBSQ HOSP IP/OBS HIGH 50: CPT

## 2019-11-23 PROCEDURE — 93010 ELECTROCARDIOGRAM REPORT: CPT

## 2019-11-23 PROCEDURE — 93306 TTE W/DOPPLER COMPLETE: CPT | Mod: 26

## 2019-11-23 RX ORDER — IPRATROPIUM/ALBUTEROL SULFATE 18-103MCG
3 AEROSOL WITH ADAPTER (GRAM) INHALATION ONCE
Refills: 0 | Status: COMPLETED | OUTPATIENT
Start: 2019-11-23 | End: 2019-11-23

## 2019-11-23 RX ORDER — POTASSIUM CHLORIDE 20 MEQ
20 PACKET (EA) ORAL ONCE
Refills: 0 | Status: COMPLETED | OUTPATIENT
Start: 2019-11-23 | End: 2019-11-23

## 2019-11-23 RX ADMIN — Medication 60 MILLIGRAM(S): at 04:38

## 2019-11-23 RX ADMIN — Medication 81 MILLIGRAM(S): at 11:16

## 2019-11-23 RX ADMIN — APIXABAN 5 MILLIGRAM(S): 2.5 TABLET, FILM COATED ORAL at 04:38

## 2019-11-23 RX ADMIN — Medication 1 APPLICATION(S): at 11:16

## 2019-11-23 RX ADMIN — Medication 60 MILLIGRAM(S): at 11:15

## 2019-11-23 RX ADMIN — Medication 3 MILLILITER(S): at 22:08

## 2019-11-23 RX ADMIN — Medication 400 INTERNATIONAL UNIT(S): at 11:15

## 2019-11-23 RX ADMIN — Medication 650 MILLIGRAM(S): at 11:26

## 2019-11-23 RX ADMIN — Medication 5 MILLILITER(S): at 16:37

## 2019-11-23 RX ADMIN — AZITHROMYCIN 255 MILLIGRAM(S): 500 TABLET, FILM COATED ORAL at 04:56

## 2019-11-23 RX ADMIN — Medication 60 MILLIGRAM(S): at 22:42

## 2019-11-23 RX ADMIN — Medication 650 MILLIGRAM(S): at 12:15

## 2019-11-23 RX ADMIN — Medication 10 MILLILITER(S): at 01:19

## 2019-11-23 RX ADMIN — Medication 20 MILLIEQUIVALENT(S): at 11:15

## 2019-11-23 RX ADMIN — Medication 60 MILLIGRAM(S): at 16:31

## 2019-11-23 RX ADMIN — Medication 650 MILLIGRAM(S): at 04:42

## 2019-11-23 RX ADMIN — CEFTRIAXONE 100 MILLIGRAM(S): 500 INJECTION, POWDER, FOR SOLUTION INTRAMUSCULAR; INTRAVENOUS at 04:38

## 2019-11-23 RX ADMIN — APIXABAN 5 MILLIGRAM(S): 2.5 TABLET, FILM COATED ORAL at 16:34

## 2019-11-23 NOTE — PROGRESS NOTE ADULT - SUBJECTIVE AND OBJECTIVE BOX
Rajni Nelson  [facility]  Pager #[phone]    Patient is a 64y old  Male who presents with a chief complaint of Pneumonia and Atrial flutter (2019 10:25)      SUBJECTIVE / OVERNIGHT EVENTS: Left-sided rib pain improving, still with cough. SOB improving as well. In NSR this morning, had rapid Aflutter overnight around 10 PM briefly.    ADDITIONAL REVIEW OF SYSTEMS:    MEDICATIONS  (STANDING):  apixaban 5 milliGRAM(s) Oral every 12 hours  aspirin enteric coated 81 milliGRAM(s) Oral daily  azithromycin  IVPB 500 milliGRAM(s) IV Intermittent every 24 hours  cefTRIAXone   IVPB 1000 milliGRAM(s) IV Intermittent every 24 hours  collagenase Ointment 1 Application(s) Topical daily  diltiazem    Tablet 60 milliGRAM(s) Oral every 6 hours  sodium chloride 0.9%. 1000 milliLiter(s) (100 mL/Hr) IV Continuous <Continuous>  vitamin E 400 International Unit(s) Oral daily    MEDICATIONS  (PRN):  acetaminophen   Tablet .. 650 milliGRAM(s) Oral every 6 hours PRN Mild Pain (1 - 3)  guaifenesin/dextromethorphan  Syrup 10 milliLiter(s) Oral every 6 hours PRN Cough  hydrocodone/homatropine Syrup 5 milliLiter(s) Oral every 6 hours PRN severe pain with cough      CAPILLARY BLOOD GLUCOSE        I&O's Summary      PHYSICAL EXAM:    Vital Signs Last 24 Hrs  T(C): 36.8 (2019 11:12), Max: 36.8 (2019 20:59)  T(F): 98.2 (2019 11:12), Max: 98.2 (2019 20:59)  HR: 91 (2019 11:12) (63 - 137)  BP: 140/82 (2019 11:12) (111/88 - 146/82)  BP(mean): --  RR: 16 (2019 11:12) (16 - 22)  SpO2: 95% (2019 11:12) (92% - 100%)    CONSTITUTIONAL: NAD, well-developed, well-groomed  EYES: PERRLA; conjunctiva and sclera clear  ENMT: Moist oral mucosa  NECK: Supple  RESPIRATORY: Normal respiratory effort; rhonchi in LLL  CARDIOVASCULAR: Regular rate and rhythm, normal S1 and S2, no murmur/rub/gallop; No lower extremity edema; Peripheral pulses are 2+ bilaterally  ABDOMEN: Nontender to palpation, normoactive bowel sounds, no rebound/guarding; No hepatosplenomegaly  MUSCULOSKELETAL:  Normal gait; no clubbing or cyanosis of digits; no joint swelling or tenderness to palpation  PSYCH: A+O to person, place, and time; affect appropriate  NEUROLOGY: CN 2-12 are intact and symmetric; no gross sensory deficits   SKIN: No rashes; no palpable lesions    LABS:                        12.5   12.73 )-----------( 196      ( 2019 05:46 )             38.9         138  |  106  |  27<H>  ----------------------------<  111<H>  3.7   |  20<L>  |  0.96    Ca    8.7      2019 04:56  Phos  3.2       Mg     2.3         TPro  7.8  /  Alb  3.0<L>  /  TBili  1.8<H>  /  DBili  x   /  AST  82<H>  /  ALT  35  /  AlkPhos  138<H>      PT/INR - ( 2019 05:46 )   PT: 16.0 SEC;   INR: 1.39          PTT - ( 2019 05:46 )  PTT:35.3 SEC  CARDIAC MARKERS ( 2019 04:10 )  x     / x     / 131 u/L / x     / x          Urinalysis Basic - ( 2019 03:53 )    Color: DARK YELLOW / Appearance: TURBID / S.037 / pH: 6.0  Gluc: NEGATIVE / Ketone: NEGATIVE  / Bili: TRACE / Urobili: TRACE   Blood: MODERATE / Protein: 600 / Nitrite: NEGATIVE   Leuk Esterase: NEGATIVE / RBC: >50 / WBC 6-11   Sq Epi: OCC / Non Sq Epi: x / Bacteria: FEW        Culture - Blood (collected 2019 09:26)  Source: BLOOD VENOUS  Preliminary Report (2019 09:26):    NO ORGANISMS ISOLATED    NO ORGANISMS ISOLATED AT 24 HOURS    Culture - Blood (collected 2019 09:26)  Source: BLOOD PERIPHERAL  Preliminary Report (2019 09:26):    NO ORGANISMS ISOLATED    NO ORGANISMS ISOLATED AT 24 HOURS        RADIOLOGY & ADDITIONAL TESTS:    < from: Transthoracic Echocardiogram (19 @ 09:20) >  CONCLUSIONS:  1. Mitral annular calcification, otherwise normal mitral  valve. Minimal mitral regurgitation.  2. Endocardium not well visualized; grossly normal left  ventricular systolic function.  3. The right ventricle is not well visualized; grossly  normal right ventricular systolic function.    Results Reviewed: Yes  Imaging Personally Reviewed:  Electrocardiogram Personally Reviewed:    COORDINATION OF CARE:  Care Discussed with Consultants/Other Providers [Y/N]:  Prior or Outpatient Records Reviewed [Y/N]:

## 2019-11-23 NOTE — PROGRESS NOTE ADULT - SUBJECTIVE AND OBJECTIVE BOX
Overnight Events:   NAEON    Medications:  acetaminophen   Tablet .. 650 milliGRAM(s) Oral every 6 hours PRN  apixaban 5 milliGRAM(s) Oral every 12 hours  aspirin enteric coated 81 milliGRAM(s) Oral daily  azithromycin  IVPB 500 milliGRAM(s) IV Intermittent every 24 hours  cefTRIAXone   IVPB 1000 milliGRAM(s) IV Intermittent every 24 hours  collagenase Ointment 1 Application(s) Topical daily  diltiazem    Tablet 60 milliGRAM(s) Oral every 6 hours  guaifenesin/dextromethorphan  Syrup 10 milliLiter(s) Oral every 6 hours PRN  hydrocodone/homatropine Syrup 5 milliLiter(s) Oral every 6 hours PRN  sodium chloride 0.9%. 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)  No significant past surgical history      Vitals:  T(F): 97.9 (-), Max: 98.9 (-)  HR: 95 () (63 - 137)  BP: 146/82 (-) (111/88 - 146/82)  RR: 19 (-)  SpO2: 96% (-)  I&O's Summary      Physical Exam:  Appearance: No acute distress; well appearing  Eyes: PERRL, EOMI, pink conjunctiva  HENT: Normal oral muscosa  Cardiovascular: irregularly irregular, S1, S2, no murmurs, rubs, or gallops; no edema; no JVD  Respiratory: Clear to auscultation bilaterally  Gastrointestinal: soft, non-tender, non-distended with normal bowel sounds  Musculoskeletal: No clubbing; no joint deformity   Neurologic: Non-focal  Lymphatic: No lymphadenopathy  Psychiatry: AAOx3, mood & affect appropriate  Skin: No rashes, ecchymoses, or cyanosis                          12.5   12.73 )-----------( 196      ( 2019 05:46 )             38.9         138  |  106  |  27<H>  ----------------------------<  111<H>  3.7   |  20<L>  |  0.96    Ca    8.7      2019 04:56  Phos  3.2       Mg     2.3         TPro  7.8  /  Alb  3.0<L>  /  TBili  1.8<H>  /  DBili  x   /  AST  82<H>  /  ALT  35  /  AlkPhos  138<H>      PT/INR - ( 2019 05:46 )   PT: 16.0 SEC;   INR: 1.39          PTT - ( 2019 05:46 )  PTT:35.3 SEC  CARDIAC MARKERS ( 2019 04:10 )  x     / x     / 131 u/L / x     / x            Total Cholesterol: 96  LDL: 49  HDL: 19  T    Hemoglobin A1C, Whole Blood: 5.2 % ( @ 04:56)

## 2019-11-23 NOTE — PROGRESS NOTE ADULT - ASSESSMENT
64 year old male with no significant past medical history presenting for left sided chest pain, cough and SOB for the past 4 days. CXR consistent with left upper and lower lobe multifocal community aquired pneumonia. Atrial flutter found on EKG with HR in 140's.

## 2019-11-23 NOTE — CHART NOTE - NSCHARTNOTEFT_GEN_A_CORE
64 year old male on Eliquis went to bathroom and noted some bright red blood in urine. States has been on Eliquis for 3 days and has never experienced this before. Denies dysuria, denies decrease in flow, or any other symptoms. Urine is otherwise clear. Will continue to monitor and follow up in am

## 2019-11-23 NOTE — PROGRESS NOTE ADULT - PROBLEM SELECTOR PLAN 2
New a-flutter with RVR on admission  trop=8-->9  ekg: aflutter with variable av block @ 130 bpm  TTE with normal EF, no significant valvular abnormalities  C/w tele monitoring  CHADVASc score 0  EP consulted- c/w diltiazem and Eliquis  C/w ASA

## 2019-11-23 NOTE — PROGRESS NOTE ADULT - PROBLEM SELECTOR PLAN 1
Multifocal community acquired PNA  CXR consistent with left upper and lower lung field pneumonia  C/w Azithromycin and Ceftriaxone   Blood cultures no growth at 24h  Hycodan and Robitussin PRN for cough  RVP negative

## 2019-11-23 NOTE — PROGRESS NOTE ADULT - ASSESSMENT
64 year old male with no significant past medical history presented with SOB, fever and productive cough. CXR shows multifocal pneumonia. + leukocytosis. He is started on IV antibiotics. Found to have Atrial fibrillation with RVR up to 150s. Currently more rate controlled after IV Cardizem and PO Cardizem. [id number] score=0. Risk for Stroke ~1%/year.  - Continue current management with PO Cardizem  - Continue apixaban 5mg BID for systemic anticoagulation  -f/u TTE  - Possible OMI/DCCV on Monday if patient is stable  - Continue other management as per primary team

## 2019-11-24 DIAGNOSIS — R31.0 GROSS HEMATURIA: ICD-10-CM

## 2019-11-24 LAB
ANION GAP SERPL CALC-SCNC: 14 MMO/L — SIGNIFICANT CHANGE UP (ref 7–14)
APPEARANCE UR: CLEAR — SIGNIFICANT CHANGE UP
BACTERIA # UR AUTO: NEGATIVE — SIGNIFICANT CHANGE UP
BILIRUB UR-MCNC: NEGATIVE — SIGNIFICANT CHANGE UP
BLOOD UR QL VISUAL: HIGH
BUN SERPL-MCNC: 22 MG/DL — SIGNIFICANT CHANGE UP (ref 7–23)
CALCIUM SERPL-MCNC: 8.3 MG/DL — LOW (ref 8.4–10.5)
CHLORIDE SERPL-SCNC: 104 MMOL/L — SIGNIFICANT CHANGE UP (ref 98–107)
CHOLEST SERPL-MCNC: 95 MG/DL — LOW (ref 120–199)
CO2 SERPL-SCNC: 20 MMOL/L — LOW (ref 22–31)
COLOR SPEC: YELLOW — SIGNIFICANT CHANGE UP
CREAT SERPL-MCNC: 0.88 MG/DL — SIGNIFICANT CHANGE UP (ref 0.5–1.3)
GLUCOSE SERPL-MCNC: 134 MG/DL — HIGH (ref 70–99)
GLUCOSE UR-MCNC: NEGATIVE — SIGNIFICANT CHANGE UP
HCT VFR BLD CALC: 35.4 % — LOW (ref 39–50)
HDLC SERPL-MCNC: 18 MG/DL — LOW (ref 35–55)
HGB BLD-MCNC: 11.6 G/DL — LOW (ref 13–17)
HYALINE CASTS # UR AUTO: SIGNIFICANT CHANGE UP
KETONES UR-MCNC: NEGATIVE — SIGNIFICANT CHANGE UP
LEUKOCYTE ESTERASE UR-ACNC: NEGATIVE — SIGNIFICANT CHANGE UP
LIPID PNL WITH DIRECT LDL SERPL: 52 MG/DL — SIGNIFICANT CHANGE UP
MAGNESIUM SERPL-MCNC: 2.1 MG/DL — SIGNIFICANT CHANGE UP (ref 1.6–2.6)
MCHC RBC-ENTMCNC: 30.4 PG — SIGNIFICANT CHANGE UP (ref 27–34)
MCHC RBC-ENTMCNC: 32.8 % — SIGNIFICANT CHANGE UP (ref 32–36)
MCV RBC AUTO: 92.7 FL — SIGNIFICANT CHANGE UP (ref 80–100)
NITRITE UR-MCNC: NEGATIVE — SIGNIFICANT CHANGE UP
NRBC # FLD: 0 K/UL — SIGNIFICANT CHANGE UP (ref 0–0)
PH UR: 6 — SIGNIFICANT CHANGE UP (ref 5–8)
PHOSPHATE SERPL-MCNC: 3.1 MG/DL — SIGNIFICANT CHANGE UP (ref 2.5–4.5)
PLATELET # BLD AUTO: 198 K/UL — SIGNIFICANT CHANGE UP (ref 150–400)
PMV BLD: 11 FL — SIGNIFICANT CHANGE UP (ref 7–13)
POTASSIUM SERPL-MCNC: 3.8 MMOL/L — SIGNIFICANT CHANGE UP (ref 3.5–5.3)
POTASSIUM SERPL-SCNC: 3.8 MMOL/L — SIGNIFICANT CHANGE UP (ref 3.5–5.3)
PROT UR-MCNC: 50 — SIGNIFICANT CHANGE UP
RBC # BLD: 3.82 M/UL — LOW (ref 4.2–5.8)
RBC # FLD: 14.4 % — SIGNIFICANT CHANGE UP (ref 10.3–14.5)
RBC CASTS # UR COMP ASSIST: >50 — HIGH (ref 0–?)
SODIUM SERPL-SCNC: 138 MMOL/L — SIGNIFICANT CHANGE UP (ref 135–145)
SP GR SPEC: 1.03 — SIGNIFICANT CHANGE UP (ref 1–1.04)
SQUAMOUS # UR AUTO: SIGNIFICANT CHANGE UP
TRIGL SERPL-MCNC: 101 MG/DL — SIGNIFICANT CHANGE UP (ref 10–149)
UROBILINOGEN FLD QL: NORMAL — SIGNIFICANT CHANGE UP
WBC # BLD: 15.08 K/UL — HIGH (ref 3.8–10.5)
WBC # FLD AUTO: 15.08 K/UL — HIGH (ref 3.8–10.5)
WBC UR QL: SIGNIFICANT CHANGE UP (ref 0–?)

## 2019-11-24 PROCEDURE — 99232 SBSQ HOSP IP/OBS MODERATE 35: CPT | Mod: GC

## 2019-11-24 PROCEDURE — 99233 SBSQ HOSP IP/OBS HIGH 50: CPT

## 2019-11-24 PROCEDURE — 71250 CT THORAX DX C-: CPT | Mod: 26

## 2019-11-24 RX ORDER — PIPERACILLIN AND TAZOBACTAM 4; .5 G/20ML; G/20ML
3.38 INJECTION, POWDER, LYOPHILIZED, FOR SOLUTION INTRAVENOUS EVERY 8 HOURS
Refills: 0 | Status: DISCONTINUED | OUTPATIENT
Start: 2019-11-24 | End: 2019-11-27

## 2019-11-24 RX ORDER — POTASSIUM CHLORIDE 20 MEQ
20 PACKET (EA) ORAL ONCE
Refills: 0 | Status: DISCONTINUED | OUTPATIENT
Start: 2019-11-24 | End: 2019-11-24

## 2019-11-24 RX ORDER — IPRATROPIUM/ALBUTEROL SULFATE 18-103MCG
3 AEROSOL WITH ADAPTER (GRAM) INHALATION EVERY 6 HOURS
Refills: 0 | Status: DISCONTINUED | OUTPATIENT
Start: 2019-11-24 | End: 2019-11-27

## 2019-11-24 RX ORDER — PIPERACILLIN AND TAZOBACTAM 4; .5 G/20ML; G/20ML
3.38 INJECTION, POWDER, LYOPHILIZED, FOR SOLUTION INTRAVENOUS ONCE
Refills: 0 | Status: COMPLETED | OUTPATIENT
Start: 2019-11-24 | End: 2019-11-24

## 2019-11-24 RX ORDER — POTASSIUM CHLORIDE 20 MEQ
20 PACKET (EA) ORAL ONCE
Refills: 0 | Status: COMPLETED | OUTPATIENT
Start: 2019-11-24 | End: 2019-11-24

## 2019-11-24 RX ADMIN — Medication 3 MILLILITER(S): at 21:11

## 2019-11-24 RX ADMIN — Medication 10 MILLILITER(S): at 18:26

## 2019-11-24 RX ADMIN — PIPERACILLIN AND TAZOBACTAM 25 GRAM(S): 4; .5 INJECTION, POWDER, LYOPHILIZED, FOR SOLUTION INTRAVENOUS at 20:16

## 2019-11-24 RX ADMIN — AZITHROMYCIN 255 MILLIGRAM(S): 500 TABLET, FILM COATED ORAL at 04:46

## 2019-11-24 RX ADMIN — Medication 81 MILLIGRAM(S): at 11:39

## 2019-11-24 RX ADMIN — Medication 20 MILLIEQUIVALENT(S): at 16:34

## 2019-11-24 RX ADMIN — Medication 60 MILLIGRAM(S): at 11:39

## 2019-11-24 RX ADMIN — APIXABAN 5 MILLIGRAM(S): 2.5 TABLET, FILM COATED ORAL at 04:45

## 2019-11-24 RX ADMIN — PIPERACILLIN AND TAZOBACTAM 200 GRAM(S): 4; .5 INJECTION, POWDER, LYOPHILIZED, FOR SOLUTION INTRAVENOUS at 11:40

## 2019-11-24 RX ADMIN — Medication 1 APPLICATION(S): at 11:39

## 2019-11-24 RX ADMIN — Medication 60 MILLIGRAM(S): at 04:44

## 2019-11-24 RX ADMIN — CEFTRIAXONE 100 MILLIGRAM(S): 500 INJECTION, POWDER, FOR SOLUTION INTRAMUSCULAR; INTRAVENOUS at 04:44

## 2019-11-24 RX ADMIN — Medication 40 MILLIGRAM(S): at 11:39

## 2019-11-24 RX ADMIN — Medication 10 MILLILITER(S): at 06:21

## 2019-11-24 RX ADMIN — Medication 60 MILLIGRAM(S): at 18:23

## 2019-11-24 RX ADMIN — Medication 400 INTERNATIONAL UNIT(S): at 11:39

## 2019-11-24 NOTE — PROGRESS NOTE ADULT - PROBLEM SELECTOR PLAN 3
New a-flutter with RVR on admission  trop=8-->9  ekg: aflutter with variable av block @ 130 bpm  TTE with normal EF, no significant valvular abnormalities  C/w tele monitoring  CHADVASc score 0  Eliquis on hold 2/2 hematuria, drop in Hgb  EP consulted- c/w diltiazem. No need for cardioversion at this time as patient has remained in NSR  C/w ASA

## 2019-11-24 NOTE — PROGRESS NOTE ADULT - PROBLEM SELECTOR PLAN 1
Multifocal community acquired PNA, however not much improvement noted over the past 24-48h  CT chest ordered  CXR consistent with left upper and lower lung field pneumonia  Switch Azithromycin and Ceftriaxone to Zosyn  Trial of prednisone 40mg daily for CAP in conjunction with abx  Blood cultures no growth at 24h  Hycodan and Robitussin PRN for cough  RVP negative

## 2019-11-24 NOTE — PROGRESS NOTE ADULT - ASSESSMENT
64 year old male with no significant past medical history presented with SOB, fever and productive cough. CXR shows multifocal pneumonia. + leukocytosis. He is started on IV antibiotics. Found to have Atrial fibrillation with RVR up to 150s. Currently more rate controlled after IV Cardizem and PO Cardizem. [id number] score=0. Risk for Stroke ~1%/year. No valvular abnormalities on TTE.  - Continue current management with PO Cardizem  - Patient with bright red blood in urine yesterday, Hgb slightly trending down however pt did also receive IVF a few days ago. Will discuss with attending whether to continue  - No need for MOI/CV is patient stays in NSR  - Continue other management as per primary team 64 year old male with no significant past medical history presented with SOB, fever and productive cough. CXR shows multifocal pneumonia. + leukocytosis. He is started on IV antibiotics. Found to have Atrial fibrillation with RVR up to 150s. Currently more rate controlled after IV Cardizem and PO Cardizem. [id number] score=0. Risk for Stroke ~1%/year. No valvular abnormalities on TTE.  - Continue current management with PO Cardizem  - Patient with bright red blood in urine yesterday, Hgb slightly trending down however pt did also receive IVF a few days ago. Would consider stopping as patient with low risk for stroke   - No need for OMI/CV is patient stays in NSR  - Continue other management as per primary team

## 2019-11-24 NOTE — PROGRESS NOTE ADULT - SUBJECTIVE AND OBJECTIVE BOX
Rajni Lee  [facility] Medicine  Pager #[phone]    Patient is a 64y old  Male who presents with a chief complaint of Pneumonia and Atrial flutter (2019 10:42)      SUBJECTIVE / OVERNIGHT EVENTS: Patient continues to have frequent cough with clear sputum, feels SOB is the same, and with left rib pain while coughing. Had episode of hematuria last night.    ADDITIONAL REVIEW OF SYSTEMS:    MEDICATIONS  (STANDING):  aspirin enteric coated 81 milliGRAM(s) Oral daily  collagenase Ointment 1 Application(s) Topical daily  diltiazem    Tablet 60 milliGRAM(s) Oral every 6 hours  piperacillin/tazobactam IVPB.. 3.375 Gram(s) IV Intermittent every 8 hours  potassium chloride   Powder 20 milliEquivalent(s) Oral once  predniSONE   Tablet 40 milliGRAM(s) Oral daily  vitamin E 400 International Unit(s) Oral daily    MEDICATIONS  (PRN):  acetaminophen   Tablet .. 650 milliGRAM(s) Oral every 6 hours PRN Mild Pain (1 - 3)  albuterol/ipratropium for Nebulization 3 milliLiter(s) Nebulizer every 6 hours PRN Shortness of Breath and/or Wheezing  guaifenesin/dextromethorphan  Syrup 10 milliLiter(s) Oral every 6 hours PRN Cough  hydrocodone/homatropine Syrup 5 milliLiter(s) Oral every 6 hours PRN severe pain with cough      CAPILLARY BLOOD GLUCOSE        I&O's Summary    2019 07:01  -  2019 13:22  --------------------------------------------------------  IN: 0 mL / OUT: 200 mL / NET: -200 mL        PHYSICAL EXAM:    Vital Signs Last 24 Hrs  T(C): 37.6 (2019 11:33), Max: 37.6 (2019 11:33)  T(F): 99.6 (2019 11:33), Max: 99.6 (2019 11:33)  HR: 94 (2019 11:33) (86 - 100)  BP: 142/82 (2019 11:33) (140/72 - 150/89)  BP(mean): --  RR: 16 (2019 11:33) (16 - 20)  SpO2: 95% (2019 11:33) (95% - 96%)    CONSTITUTIONAL: NAD, well-developed, well-groomed  EYES: PERRLA; conjunctiva and sclera clear  ENMT: Moist oral mucosa  NECK: Supple  RESPIRATORY: Normal respiratory effort; rhonchi in LLL, coughing frequently  CARDIOVASCULAR: Regular rate and rhythm, normal S1 and S2, no murmur/rub/gallop; nonpitting edema of b/l lower extremities  ABDOMEN: Nontender to palpation, normoactive bowel sounds, no rebound/guarding; No hepatosplenomegaly  MUSCULOSKELETAL:  Normal gait; no clubbing or cyanosis of digits; no joint swelling or tenderness to palpation  PSYCH: A+O to person, place, and time; affect appropriate  NEUROLOGY: CN 2-12 are intact and symmetric; no gross sensory deficits   SKIN: No rashes; no palpable lesions    LABS:                        11.6   15.08 )-----------( 198      ( 2019 04:37 )             35.4         138  |  104  |  22  ----------------------------<  134<H>  3.8   |  20<L>  |  0.88    Ca    8.3<L>      2019 04:37  Phos  3.1       Mg     2.1     24      PT/INR - ( 2019 05:46 )   PT: 16.0 SEC;   INR: 1.39          PTT - ( 2019 05:46 )  PTT:35.3 SEC      Urinalysis Basic - ( 2019 10:30 )    Color: YELLOW / Appearance: CLEAR / S.027 / pH: 6.0  Gluc: NEGATIVE / Ketone: NEGATIVE  / Bili: NEGATIVE / Urobili: NORMAL   Blood: MODERATE / Protein: 50 / Nitrite: NEGATIVE   Leuk Esterase: NEGATIVE / RBC: >50 / WBC 3-5   Sq Epi: OCC / Non Sq Epi: x / Bacteria: NEGATIVE        Culture - Blood (collected 2019 09:26)  Source: BLOOD VENOUS  Preliminary Report (2019 09:26):    NO ORGANISMS ISOLATED    NO ORGANISMS ISOLATED AT 48 HRS.    Culture - Blood (collected 2019 09:26)  Source: BLOOD PERIPHERAL  Preliminary Report (2019 09:26):    NO ORGANISMS ISOLATED    NO ORGANISMS ISOLATED AT 48 HRS.        RADIOLOGY & ADDITIONAL TESTS:  Results Reviewed: Yes  Imaging Personally Reviewed: No new imaging  Electrocardiogram Personally Reviewed:    COORDINATION OF CARE:  Care Discussed with Consultants/Other Providers [Y/N]:  Prior or Outpatient Records Reviewed [Y/N]:

## 2019-11-25 LAB
ANION GAP SERPL CALC-SCNC: 13 MMO/L — SIGNIFICANT CHANGE UP (ref 7–14)
BUN SERPL-MCNC: 24 MG/DL — HIGH (ref 7–23)
CALCIUM SERPL-MCNC: 8.6 MG/DL — SIGNIFICANT CHANGE UP (ref 8.4–10.5)
CHLORIDE SERPL-SCNC: 104 MMOL/L — SIGNIFICANT CHANGE UP (ref 98–107)
CO2 SERPL-SCNC: 22 MMOL/L — SIGNIFICANT CHANGE UP (ref 22–31)
CREAT SERPL-MCNC: 0.94 MG/DL — SIGNIFICANT CHANGE UP (ref 0.5–1.3)
GLUCOSE SERPL-MCNC: 155 MG/DL — HIGH (ref 70–99)
HCT VFR BLD CALC: 40.6 % — SIGNIFICANT CHANGE UP (ref 39–50)
HGB BLD-MCNC: 12.5 G/DL — LOW (ref 13–17)
MAGNESIUM SERPL-MCNC: 2.3 MG/DL — SIGNIFICANT CHANGE UP (ref 1.6–2.6)
MCHC RBC-ENTMCNC: 30.1 PG — SIGNIFICANT CHANGE UP (ref 27–34)
MCHC RBC-ENTMCNC: 30.8 % — LOW (ref 32–36)
MCV RBC AUTO: 97.8 FL — SIGNIFICANT CHANGE UP (ref 80–100)
NRBC # FLD: 0 K/UL — SIGNIFICANT CHANGE UP (ref 0–0)
PHOSPHATE SERPL-MCNC: 2.9 MG/DL — SIGNIFICANT CHANGE UP (ref 2.5–4.5)
PLATELET # BLD AUTO: 203 K/UL — SIGNIFICANT CHANGE UP (ref 150–400)
PMV BLD: 11.5 FL — SIGNIFICANT CHANGE UP (ref 7–13)
POTASSIUM SERPL-MCNC: 4 MMOL/L — SIGNIFICANT CHANGE UP (ref 3.5–5.3)
POTASSIUM SERPL-SCNC: 4 MMOL/L — SIGNIFICANT CHANGE UP (ref 3.5–5.3)
RBC # BLD: 4.15 M/UL — LOW (ref 4.2–5.8)
RBC # FLD: 14.5 % — SIGNIFICANT CHANGE UP (ref 10.3–14.5)
SODIUM SERPL-SCNC: 139 MMOL/L — SIGNIFICANT CHANGE UP (ref 135–145)
WBC # BLD: 17.28 K/UL — HIGH (ref 3.8–10.5)
WBC # FLD AUTO: 17.28 K/UL — HIGH (ref 3.8–10.5)

## 2019-11-25 PROCEDURE — 99233 SBSQ HOSP IP/OBS HIGH 50: CPT

## 2019-11-25 RX ORDER — SODIUM CHLORIDE 0.65 %
1 AEROSOL, SPRAY (ML) NASAL THREE TIMES A DAY
Refills: 0 | Status: DISCONTINUED | OUTPATIENT
Start: 2019-11-25 | End: 2019-11-27

## 2019-11-25 RX ORDER — FUROSEMIDE 40 MG
40 TABLET ORAL ONCE
Refills: 0 | Status: COMPLETED | OUTPATIENT
Start: 2019-11-25 | End: 2019-11-25

## 2019-11-25 RX ADMIN — Medication 3 MILLILITER(S): at 06:18

## 2019-11-25 RX ADMIN — PIPERACILLIN AND TAZOBACTAM 25 GRAM(S): 4; .5 INJECTION, POWDER, LYOPHILIZED, FOR SOLUTION INTRAVENOUS at 20:03

## 2019-11-25 RX ADMIN — PIPERACILLIN AND TAZOBACTAM 25 GRAM(S): 4; .5 INJECTION, POWDER, LYOPHILIZED, FOR SOLUTION INTRAVENOUS at 04:05

## 2019-11-25 RX ADMIN — Medication 400 INTERNATIONAL UNIT(S): at 11:31

## 2019-11-25 RX ADMIN — Medication 40 MILLIGRAM(S): at 06:11

## 2019-11-25 RX ADMIN — Medication 40 MILLIGRAM(S): at 10:49

## 2019-11-25 RX ADMIN — Medication 60 MILLIGRAM(S): at 12:07

## 2019-11-25 RX ADMIN — Medication 60 MILLIGRAM(S): at 06:14

## 2019-11-25 RX ADMIN — PIPERACILLIN AND TAZOBACTAM 25 GRAM(S): 4; .5 INJECTION, POWDER, LYOPHILIZED, FOR SOLUTION INTRAVENOUS at 12:10

## 2019-11-25 RX ADMIN — Medication 3 MILLILITER(S): at 19:05

## 2019-11-25 RX ADMIN — Medication 60 MILLIGRAM(S): at 17:57

## 2019-11-25 RX ADMIN — Medication 60 MILLIGRAM(S): at 23:22

## 2019-11-25 RX ADMIN — Medication 10 MILLILITER(S): at 06:13

## 2019-11-25 RX ADMIN — Medication 1 APPLICATION(S): at 12:14

## 2019-11-25 RX ADMIN — Medication 60 MILLIGRAM(S): at 00:37

## 2019-11-25 RX ADMIN — Medication 81 MILLIGRAM(S): at 11:31

## 2019-11-25 NOTE — PROGRESS NOTE ADULT - SUBJECTIVE AND OBJECTIVE BOX
[facility] Division of Hospital Medicine  Bryan Ochoa MD  Pager [phone]      Patient is a 64y old  Male who presents with a chief complaint of Pneumonia and Atrial flutter (2019 10:12)      SUBJECTIVE / OVERNIGHT EVENTS: Pt endorses persisting cough, albeit better than before. Improved SOB    MEDICATIONS  (STANDING):  aspirin enteric coated 81 milliGRAM(s) Oral daily  collagenase Ointment 1 Application(s) Topical daily  diltiazem    Tablet 60 milliGRAM(s) Oral every 6 hours  piperacillin/tazobactam IVPB.. 3.375 Gram(s) IV Intermittent every 8 hours  predniSONE   Tablet 40 milliGRAM(s) Oral daily  vitamin E 400 International Unit(s) Oral daily    MEDICATIONS  (PRN):  acetaminophen   Tablet .. 650 milliGRAM(s) Oral every 6 hours PRN Mild Pain (1 - 3)  albuterol/ipratropium for Nebulization 3 milliLiter(s) Nebulizer every 6 hours PRN Shortness of Breath and/or Wheezing  guaifenesin/dextromethorphan  Syrup 10 milliLiter(s) Oral every 6 hours PRN Cough  hydrocodone/homatropine Syrup 5 milliLiter(s) Oral every 6 hours PRN severe pain with cough      CAPILLARY BLOOD GLUCOSE        I&O's Summary    2019 07:  -  2019 07:00  --------------------------------------------------------  IN: 0 mL / OUT: 200 mL / NET: -200 mL    2019 07:  -  2019 15:49  --------------------------------------------------------  IN: 0 mL / OUT: 1100 mL / NET: -1100 mL        PHYSICAL EXAM:  Vital Signs Last 24 Hrs  T(C): 36.8 (2019 12:07), Max: 37.4 (2019 18:22)  T(F): 98.2 (2019 12:07), Max: 99.4 (2019 18:22)  HR: 97 (2019 12:07) (77 - 98)  BP: 159/76 (2019 12:07) (124/80 - 159/76)  BP(mean): --  RR: 19 (2019 12:07) (16 - 19)  SpO2: 96% (2019 06:18) (96% - 98%)  CONSTITUTIONAL: NAD  EYES: PERRLA; conjunctiva and sclera clear  ENMT: mmm  NECK: Supple, no palpable masses; no thyromegaly  RESPIRATORY: bibasilar crackles  CARDIOVASCULAR: Regular rate and rhythm, + S1 and S2  ABDOMEN: Nontender to palpation, normoactive bowel sounds, no rebound/guarding  MUSCULOSKELETAL: LLE wound with dressing  PSYCH: A+O to person, place, and time; affect appropriate  NEUROLOGY: no gross sensory deficits   SKIN: No rashes;     LABS:                        12.5   17.28 )-----------( 203      ( 2019 06:05 )             40.6     11-25    139  |  104  |  24<H>  ----------------------------<  155<H>  4.0   |  22  |  0.94    Ca    8.6      2019 06:05  Phos  2.9     11-25  Mg     2.3     -25            Urinalysis Basic - ( 2019 10:30 )    Color: YELLOW / Appearance: CLEAR / S.027 / pH: 6.0  Gluc: NEGATIVE / Ketone: NEGATIVE  / Bili: NEGATIVE / Urobili: NORMAL   Blood: MODERATE / Protein: 50 / Nitrite: NEGATIVE   Leuk Esterase: NEGATIVE / RBC: >50 / WBC 3-5   Sq Epi: OCC / Non Sq Epi: x / Bacteria: NEGATIVE          RADIOLOGY & ADDITIONAL TESTS:  Results Reviewed:   Imaging Personally Reviewed:  Electrocardiogram Personally Reviewed:    COORDINATION OF CARE:  Care Discussed with Consultants/Other Providers [Y/N]:  Prior or Outpatient Records Reviewed [Y/N]:

## 2019-11-25 NOTE — ADVANCED PRACTICE NURSE CONSULT - ASSESSMENT
A&Ox4, OOB ad marty, continent of urine and stool. Skin warm, dry with increased moisture in intertriginous folds, adequate skin turgor.    Bilateral lower extremities dry, flaky skin. Thickened-yellow toenails. No temperature changes noted. No Edema. Capillary refill < 3 seconds. B/L DP/PT pulses palpable, with biphasic doppler sounds. Denies numbness and tingling of lower legs/feet.    Left plantar foot (sub-metatarsal 3rd toe): chronic neuropathic wound measures 0.7cmx0.5cmx0.5cm. Patient followed by Podiatry services once a week outpatient for debridement- reports that wound started as a callus. Reports using collagenase once a day outpatient and performs dressing change on own. Wound base is 100% subcutaneous tissue. Small amount of serous drainage, no odor. Periwound skin with circumferential callus. No induration, no increased warmth, no erythema. Goal of care: counseled patient on different topical management for clinical wound presentation at this time, patient wishes to continue recommendations as per outpatient Podiatry with collagenase.

## 2019-11-25 NOTE — ADVANCED PRACTICE NURSE CONSULT - REASON FOR CONSULT
Patient seen on skin care rounds after wound care referral received for assessment of skin impairment and recommendations of topical management. Chart reviewed: Serum WBC 17.28 (on IV antibiotics-treated for PNA), Floyd 20, BMI 29.5kg/m2. Patient H/O of foot ulcer, treated for osteomyelitis in the past admission. Patient admitted with PNA.

## 2019-11-25 NOTE — PROGRESS NOTE ADULT - PROBLEM SELECTOR PLAN 3
New a-flutter with RVR on admission  trop=8-->9  Pt convertedt to sinus  TTE with normal EF, no significant valvular abnormalities  C/w tele monitoring  CHADVASc score 0  Eliquis on hold 2/2 hematuria, drop in Hgb  EP consulted- c/w diltiazem. No need for cardioversion at this time as patient has remained in NSR  C/w ASA

## 2019-11-25 NOTE — PROGRESS NOTE ADULT - PROBLEM SELECTOR PLAN 1
Multifocal community acquired PNA, confirmed on chest CT  c/w Zosyn for now  c/w Trial of prednisone 40mg daily for CAP in conjunction with abx  Blood cultures NGTD  Hycodan and Robitussin PRN for cough  RVP negative

## 2019-11-25 NOTE — PROGRESS NOTE ADULT - SUBJECTIVE AND OBJECTIVE BOX
Patient is seen and examined. Denies any chest pain, SOB, palpitations or dizziness. Has c/o cough    PAST MEDICAL & SURGICAL HISTORY:  Wound: (chronic wound to plantar aspect of left foot)  No pertinent past medical history  No significant past surgical history      MEDICATIONS  (STANDING):  aspirin enteric coated 81 milliGRAM(s) Oral daily  collagenase Ointment 1 Application(s) Topical daily  diltiazem    Tablet 60 milliGRAM(s) Oral every 6 hours  piperacillin/tazobactam IVPB.. 3.375 Gram(s) IV Intermittent every 8 hours  predniSONE   Tablet 40 milliGRAM(s) Oral daily  vitamin E 400 International Unit(s) Oral daily    MEDICATIONS  (PRN):  acetaminophen   Tablet .. 650 milliGRAM(s) Oral every 6 hours PRN Mild Pain (1 - 3)  albuterol/ipratropium for Nebulization 3 milliLiter(s) Nebulizer every 6 hours PRN Shortness of Breath and/or Wheezing  guaifenesin/dextromethorphan  Syrup 10 milliLiter(s) Oral every 6 hours PRN Cough  hydrocodone/homatropine Syrup 5 milliLiter(s) Oral every 6 hours PRN severe pain with cough    Vital Signs Last 24 Hrs  T(C): 37.1 (2019 05:39), Max: 37.6 (2019 11:33)  T(F): 98.7 (2019 05:39), Max: 99.6 (2019 11:33)  HR: 88 (2019 06:18) (77 - 98)  BP: 124/80 (2019 05:39) (124/80 - 150/75)  BP(mean): --  RR: 18 (2019 05:39) (16 - 18)  SpO2: 96% (2019 06:18) (95% - 98%)    INTERPRETATION OF TELEMETRY: Sinus rhythm with HR 70s-80s  LABS:                        12.5   17.28 )-----------( 203      ( 2019 06:05 )             40.6         139  |  104  |  24<H>  ----------------------------<  155<H>  4.0   |  22  |  0.94    Ca    8.6      2019 06:05  Phos  2.9     -  Mg     2.3     -25            Urinalysis Basic - ( 2019 10:30 )    Color: YELLOW / Appearance: CLEAR / S.027 / pH: 6.0  Gluc: NEGATIVE / Ketone: NEGATIVE  / Bili: NEGATIVE / Urobili: NORMAL   Blood: MODERATE / Protein: 50 / Nitrite: NEGATIVE   Leuk Esterase: NEGATIVE / RBC: >50 / WBC 3-5   Sq Epi: OCC / Non Sq Epi: x / Bacteria: NEGATIVE      I&O's Summary    2019 07:01  -  2019 07:00  --------------------------------------------------------  IN: 0 mL / OUT: 200 mL / NET: -200 mL    PHYSICAL EXAM:    GENERAL: In no apparent distress, well nourished, and hydrated.  HEAD:  Atraumatic, Normocephalic  HEART: Regular rate and rhythm; No murmurs, rubs, or gallops.  PULMONARY: Lungs with crackles and wheezes on left lung fields  ABDOMEN: Soft, Nontender, Nondistended; Bowel sounds present  EXTREMITIES:  2+ Peripheral Pulses, No clubbing, cyanosis, or edema

## 2019-11-25 NOTE — ADVANCED PRACTICE NURSE CONSULT - RECOMMEDATIONS
Follow up outpatient with Podiatry or Recommend follow up care at [facility] ([phone], [address] [place] [address]).     Left plantar: Cleanse with SAF-clens, rinse with NS, pat dry. Apply Liquid barrier film to periwound skin. apply Collagenase to wound base, cover with gauze and secure with Kerlix. Change daily.    Please call wound care service line is further assistance is needed ([phone]).

## 2019-11-25 NOTE — PROGRESS NOTE ADULT - ASSESSMENT
64 year old male with no significant past medical history presented with SOB, fever and productive cough. CXR shows multifocal pneumonia. + leukocytosis. He is started on IV antibiotics. Found to have Atrial fibrillation with RVR up to 150s. Currently more rate controlled after IV Cardizem and PO Cardizem. [id number] score=0. Risk for Stroke ~1%/year. No valvular abnormalities on TTE. Apixaban discontinued due to hematuria. Patient is self converted to sinus rhythm on Friday and maintaining sinus rhythm  - Continue current management with PO Cardizem  - - Continue other management as per primary team

## 2019-11-26 LAB
ANION GAP SERPL CALC-SCNC: 13 MMO/L — SIGNIFICANT CHANGE UP (ref 7–14)
ANISOCYTOSIS BLD QL: SLIGHT — SIGNIFICANT CHANGE UP
BASOPHILS # BLD AUTO: 0.06 K/UL — SIGNIFICANT CHANGE UP (ref 0–0.2)
BASOPHILS NFR BLD AUTO: 0.3 % — SIGNIFICANT CHANGE UP (ref 0–2)
BASOPHILS NFR SPEC: 0 % — SIGNIFICANT CHANGE UP (ref 0–2)
BLASTS # FLD: 0 % — SIGNIFICANT CHANGE UP (ref 0–0)
BUN SERPL-MCNC: 25 MG/DL — HIGH (ref 7–23)
CALCIUM SERPL-MCNC: 8.6 MG/DL — SIGNIFICANT CHANGE UP (ref 8.4–10.5)
CHLORIDE SERPL-SCNC: 101 MMOL/L — SIGNIFICANT CHANGE UP (ref 98–107)
CO2 SERPL-SCNC: 25 MMOL/L — SIGNIFICANT CHANGE UP (ref 22–31)
CREAT SERPL-MCNC: 0.96 MG/DL — SIGNIFICANT CHANGE UP (ref 0.5–1.3)
EOSINOPHIL # BLD AUTO: 0.31 K/UL — SIGNIFICANT CHANGE UP (ref 0–0.5)
EOSINOPHIL NFR BLD AUTO: 1.6 % — SIGNIFICANT CHANGE UP (ref 0–6)
EOSINOPHIL NFR FLD: 1.8 % — SIGNIFICANT CHANGE UP (ref 0–6)
GIANT PLATELETS BLD QL SMEAR: PRESENT — SIGNIFICANT CHANGE UP
GLUCOSE SERPL-MCNC: 153 MG/DL — HIGH (ref 70–99)
HCT VFR BLD CALC: 37.4 % — LOW (ref 39–50)
HGB BLD-MCNC: 11.7 G/DL — LOW (ref 13–17)
IMM GRANULOCYTES NFR BLD AUTO: 7.3 % — HIGH (ref 0–1.5)
LYMPHOCYTES # BLD AUTO: 11.8 % — LOW (ref 13–44)
LYMPHOCYTES # BLD AUTO: 2.36 K/UL — SIGNIFICANT CHANGE UP (ref 1–3.3)
LYMPHOCYTES NFR SPEC AUTO: 4.5 % — LOW (ref 13–44)
MACROCYTES BLD QL: SLIGHT — SIGNIFICANT CHANGE UP
MAGNESIUM SERPL-MCNC: 2 MG/DL — SIGNIFICANT CHANGE UP (ref 1.6–2.6)
MCHC RBC-ENTMCNC: 29.5 PG — SIGNIFICANT CHANGE UP (ref 27–34)
MCHC RBC-ENTMCNC: 31.3 % — LOW (ref 32–36)
MCV RBC AUTO: 94.4 FL — SIGNIFICANT CHANGE UP (ref 80–100)
METAMYELOCYTES # FLD: 0.9 % — SIGNIFICANT CHANGE UP (ref 0–1)
MONOCYTES # BLD AUTO: 0.94 K/UL — HIGH (ref 0–0.9)
MONOCYTES NFR BLD AUTO: 4.7 % — SIGNIFICANT CHANGE UP (ref 2–14)
MONOCYTES NFR BLD: 7.1 % — SIGNIFICANT CHANGE UP (ref 2–9)
MYELOCYTES NFR BLD: 2.7 % — HIGH (ref 0–0)
NEUTROPHIL AB SER-ACNC: 83 % — HIGH (ref 43–77)
NEUTROPHILS # BLD AUTO: 14.84 K/UL — HIGH (ref 1.8–7.4)
NEUTROPHILS NFR BLD AUTO: 74.3 % — SIGNIFICANT CHANGE UP (ref 43–77)
NEUTS BAND # BLD: 0 % — SIGNIFICANT CHANGE UP (ref 0–6)
NRBC # FLD: 0 K/UL — SIGNIFICANT CHANGE UP (ref 0–0)
OTHER - HEMATOLOGY %: 0 — SIGNIFICANT CHANGE UP
PHOSPHATE SERPL-MCNC: 3.3 MG/DL — SIGNIFICANT CHANGE UP (ref 2.5–4.5)
PLATELET # BLD AUTO: 245 K/UL — SIGNIFICANT CHANGE UP (ref 150–400)
PLATELET COUNT - ESTIMATE: NORMAL — SIGNIFICANT CHANGE UP
PMV BLD: 11 FL — SIGNIFICANT CHANGE UP (ref 7–13)
POLYCHROMASIA BLD QL SMEAR: SLIGHT — SIGNIFICANT CHANGE UP
POTASSIUM SERPL-MCNC: 3.7 MMOL/L — SIGNIFICANT CHANGE UP (ref 3.5–5.3)
POTASSIUM SERPL-SCNC: 3.7 MMOL/L — SIGNIFICANT CHANGE UP (ref 3.5–5.3)
PROMYELOCYTES # FLD: 0 % — SIGNIFICANT CHANGE UP (ref 0–0)
RBC # BLD: 3.96 M/UL — LOW (ref 4.2–5.8)
RBC # FLD: 14.4 % — SIGNIFICANT CHANGE UP (ref 10.3–14.5)
SODIUM SERPL-SCNC: 139 MMOL/L — SIGNIFICANT CHANGE UP (ref 135–145)
VARIANT LYMPHS # BLD: 0 % — SIGNIFICANT CHANGE UP
WBC # BLD: 19.97 K/UL — HIGH (ref 3.8–10.5)
WBC # FLD AUTO: 19.97 K/UL — HIGH (ref 3.8–10.5)

## 2019-11-26 PROCEDURE — 99232 SBSQ HOSP IP/OBS MODERATE 35: CPT

## 2019-11-26 RX ADMIN — Medication 10 MILLILITER(S): at 03:44

## 2019-11-26 RX ADMIN — Medication 81 MILLIGRAM(S): at 12:17

## 2019-11-26 RX ADMIN — Medication 10 MILLILITER(S): at 20:49

## 2019-11-26 RX ADMIN — Medication 40 MILLIGRAM(S): at 05:43

## 2019-11-26 RX ADMIN — PIPERACILLIN AND TAZOBACTAM 25 GRAM(S): 4; .5 INJECTION, POWDER, LYOPHILIZED, FOR SOLUTION INTRAVENOUS at 03:59

## 2019-11-26 RX ADMIN — Medication 60 MILLIGRAM(S): at 12:16

## 2019-11-26 RX ADMIN — PIPERACILLIN AND TAZOBACTAM 25 GRAM(S): 4; .5 INJECTION, POWDER, LYOPHILIZED, FOR SOLUTION INTRAVENOUS at 20:21

## 2019-11-26 RX ADMIN — Medication 60 MILLIGRAM(S): at 05:43

## 2019-11-26 RX ADMIN — Medication 400 INTERNATIONAL UNIT(S): at 12:16

## 2019-11-26 RX ADMIN — PIPERACILLIN AND TAZOBACTAM 25 GRAM(S): 4; .5 INJECTION, POWDER, LYOPHILIZED, FOR SOLUTION INTRAVENOUS at 12:17

## 2019-11-26 RX ADMIN — Medication 1 APPLICATION(S): at 12:16

## 2019-11-26 NOTE — PROGRESS NOTE ADULT - SUBJECTIVE AND OBJECTIVE BOX
Patient is a 64y old  Male who presents with a chief complaint of Pneumonia and Atrial flutter (25 Nov 2019 15:49)  Still coughing at times.  On O2 support.  Denies palpitations, chest pain.      PAST MEDICAL & SURGICAL HISTORY:  Wound: (chronic wound to plantar aspect of left foot)  No pertinent past medical history  No significant past surgical history      MEDICATIONS  (STANDING):  aspirin enteric coated 81 milliGRAM(s) Oral daily  collagenase Ointment 1 Application(s) Topical daily  diltiazem    Tablet 60 milliGRAM(s) Oral every 6 hours  piperacillin/tazobactam IVPB.. 3.375 Gram(s) IV Intermittent every 8 hours  predniSONE   Tablet 40 milliGRAM(s) Oral daily  vitamin E 400 International Unit(s) Oral daily    MEDICATIONS  (PRN):  acetaminophen   Tablet .. 650 milliGRAM(s) Oral every 6 hours PRN Mild Pain (1 - 3)  albuterol/ipratropium for Nebulization 3 milliLiter(s) Nebulizer every 6 hours PRN Shortness of Breath and/or Wheezing  guaifenesin/dextromethorphan  Syrup 10 milliLiter(s) Oral every 6 hours PRN Cough  hydrocodone/homatropine Syrup 5 milliLiter(s) Oral every 6 hours PRN severe pain with cough  sodium chloride 0.65% Nasal 1 Spray(s) Both Nostrils three times a day PRN Nasal Congestion            Vital Signs Last 24 Hrs  T(C): 36.8 (26 Nov 2019 05:24), Max: 36.9 (25 Nov 2019 23:10)  T(F): 98.3 (26 Nov 2019 05:24), Max: 98.4 (25 Nov 2019 23:10)  HR: 89 (26 Nov 2019 05:24) (84 - 97)  BP: 157/90 (26 Nov 2019 05:24) (121/70 - 159/76)  BP(mean): --  RR: 18 (26 Nov 2019 05:24) (18 - 19)  SpO2: 97% (26 Nov 2019 05:24) (92% - 97%)            INTERPRETATION OF TELEMETRY: SR 70-80    ECG:        LABS:                        11.7   19.97 )-----------( 245      ( 26 Nov 2019 06:16 )             37.4     11-26    139  |  101  |  25<H>  ----------------------------<  153<H>  3.7   |  25  |  0.96    Ca    8.6      26 Nov 2019 06:16  Phos  3.3     11-26  Mg     2.0     11-26                BNP  RADIOLOGY & ADDITIONAL STUDIES:      PHYSICAL EXAM:    GENERAL: In no apparent distress, well nourished, and hydrated.  NECK: Supple and normal thyroid.  No JVD or carotid bruit.  Carotid pulse is 2+ bilaterally.  HEART: Regular rate and rhythm; No murmurs, rubs, or gallops.  PULMONARY: LLL rhonchi auscultated,  no rales, wheezing, or rhonchi R lung  ABDOMEN: Soft, Nontender, Nondistended; Bowel sounds present  EXTREMITIES:  2+ Peripheral Pulses, No clubbing, cyanosis, or edema  NEUROLOGICAL: Grossly nonfocal

## 2019-11-26 NOTE — PROGRESS NOTE ADULT - PROBLEM SELECTOR PLAN 1
Multifocal community acquired PNA, confirmed on chest CT  c/w Zosyn for now, will transition to po in the AM  c/w Trial of prednisone 40mg daily for CAP in conjunction with abx  Blood cultures NGTD  Hycodan and Robitussin PRN for cough  RVP negative

## 2019-11-26 NOTE — PROGRESS NOTE ADULT - SUBJECTIVE AND OBJECTIVE BOX
[facility] Division of Hospital Medicine  Bryan Ochoa MD  Pager [phone]      Patient is a 64y old  Male who presents with a chief complaint of Pneumonia and Atrial flutter (26 Nov 2019 11:03)      SUBJECTIVE / OVERNIGHT EVENTS:  Improved SOB and cough      MEDICATIONS  (STANDING):  aspirin enteric coated 81 milliGRAM(s) Oral daily  collagenase Ointment 1 Application(s) Topical daily  diltiazem    Tablet 60 milliGRAM(s) Oral every 6 hours  piperacillin/tazobactam IVPB.. 3.375 Gram(s) IV Intermittent every 8 hours  predniSONE   Tablet 40 milliGRAM(s) Oral daily  vitamin E 400 International Unit(s) Oral daily    MEDICATIONS  (PRN):  acetaminophen   Tablet .. 650 milliGRAM(s) Oral every 6 hours PRN Mild Pain (1 - 3)  albuterol/ipratropium for Nebulization 3 milliLiter(s) Nebulizer every 6 hours PRN Shortness of Breath and/or Wheezing  guaifenesin/dextromethorphan  Syrup 10 milliLiter(s) Oral every 6 hours PRN Cough  hydrocodone/homatropine Syrup 5 milliLiter(s) Oral every 6 hours PRN severe pain with cough  sodium chloride 0.65% Nasal 1 Spray(s) Both Nostrils three times a day PRN Nasal Congestion      CAPILLARY BLOOD GLUCOSE        I&O's Summary    25 Nov 2019 07:01  -  26 Nov 2019 07:00  --------------------------------------------------------  IN: 0 mL / OUT: 1100 mL / NET: -1100 mL        PHYSICAL EXAM:  Vital Signs Last 24 Hrs  T(C): 36.7 (26 Nov 2019 12:15), Max: 36.9 (25 Nov 2019 23:10)  T(F): 98.1 (26 Nov 2019 12:15), Max: 98.4 (25 Nov 2019 23:10)  HR: 89 (26 Nov 2019 12:15) (84 - 96)  BP: 142/82 (26 Nov 2019 12:15) (121/70 - 157/90)  BP(mean): --  RR: 16 (26 Nov 2019 12:15) (16 - 19)  SpO2: 94% (26 Nov 2019 12:15) (92% - 97%)    CONSTITUTIONAL: NAD  EYES: PERRLA; conjunctiva and sclera clear  ENMT: mmm  NECK: Supple, no palpable masses; no thyromegaly  RESPIRATORY: improving bibasilar crackles  CARDIOVASCULAR: Regular rate and rhythm, + S1 and S2  ABDOMEN: Nontender to palpation, normoactive bowel sounds, no rebound/guarding  MUSCULOSKELETAL:  no clubbing or cyanosis of digits;   PSYCH: A+O to person, place, and time; affect appropriate  NEUROLOGY: no gross sensory deficits   SKIN: No rashes;     LABS:                        11.7   19.97 )-----------( 245      ( 26 Nov 2019 06:16 )             37.4     11-26    139  |  101  |  25<H>  ----------------------------<  153<H>  3.7   |  25  |  0.96    Ca    8.6      26 Nov 2019 06:16  Phos  3.3     11-26  Mg     2.0     11-26

## 2019-11-26 NOTE — PROGRESS NOTE ADULT - ASSESSMENT
64 year old male with no significant past medical history presented with SOB, fever and productive cough. CXR shows multifocal pneumonia. + leukocytosis. He is started on IV antibiotics. Found to have Atrial fibrillation with RVR up to 150s. [id number] score=0. Risk for Stroke ~1%/year. No valvular abnormalities on TTE. Apixaban discontinued due to hematuria. Patient self converted to sinus rhythm on Friday and maintaining in sinus rhythm since.   - Continue current management for PNA   -Continue oral Cardizem for rate control  - - Continue other management as per primary team  -Follow up with Dr. Ruiz on 12/17 at 11:00am

## 2019-11-26 NOTE — PROGRESS NOTE ADULT - PROBLEM SELECTOR PLAN 3
New a-flutter with RVR on admission  trop=8-->9  Pt converted to sinus  TTE with normal EF, no significant valvular abnormalities  C/w tele monitoring  CHADVASc score 0  Eliquis on hold 2/2 hematuria, drop in Hgb  EP consulted- c/w diltiazem. No need for cardioversion at this time as patient has remained in NSR  C/w ASA

## 2019-11-27 ENCOUNTER — TRANSCRIPTION ENCOUNTER (OUTPATIENT)
Age: 64
End: 2019-11-27

## 2019-11-27 VITALS — DIASTOLIC BLOOD PRESSURE: 76 MMHG | SYSTOLIC BLOOD PRESSURE: 145 MMHG | HEART RATE: 85 BPM

## 2019-11-27 LAB
ANION GAP SERPL CALC-SCNC: 13 MMO/L — SIGNIFICANT CHANGE UP (ref 7–14)
BACTERIA BLD CULT: SIGNIFICANT CHANGE UP
BACTERIA BLD CULT: SIGNIFICANT CHANGE UP
BASOPHILS # BLD AUTO: 0.1 K/UL — SIGNIFICANT CHANGE UP (ref 0–0.2)
BASOPHILS NFR BLD AUTO: 0.6 % — SIGNIFICANT CHANGE UP (ref 0–2)
BUN SERPL-MCNC: 24 MG/DL — HIGH (ref 7–23)
CALCIUM SERPL-MCNC: 8.6 MG/DL — SIGNIFICANT CHANGE UP (ref 8.4–10.5)
CHLORIDE SERPL-SCNC: 102 MMOL/L — SIGNIFICANT CHANGE UP (ref 98–107)
CO2 SERPL-SCNC: 24 MMOL/L — SIGNIFICANT CHANGE UP (ref 22–31)
CREAT SERPL-MCNC: 0.84 MG/DL — SIGNIFICANT CHANGE UP (ref 0.5–1.3)
D DIMER BLD IA.RAPID-MCNC: 1033 NG/ML — SIGNIFICANT CHANGE UP
EOSINOPHIL # BLD AUTO: 0.19 K/UL — SIGNIFICANT CHANGE UP (ref 0–0.5)
EOSINOPHIL NFR BLD AUTO: 1.1 % — SIGNIFICANT CHANGE UP (ref 0–6)
GLUCOSE SERPL-MCNC: 124 MG/DL — HIGH (ref 70–99)
HCT VFR BLD CALC: 40.3 % — SIGNIFICANT CHANGE UP (ref 39–50)
HGB BLD-MCNC: 12.6 G/DL — LOW (ref 13–17)
IMM GRANULOCYTES NFR BLD AUTO: 5.2 % — HIGH (ref 0–1.5)
LYMPHOCYTES # BLD AUTO: 1.44 K/UL — SIGNIFICANT CHANGE UP (ref 1–3.3)
LYMPHOCYTES # BLD AUTO: 8.6 % — LOW (ref 13–44)
MAGNESIUM SERPL-MCNC: 2.2 MG/DL — SIGNIFICANT CHANGE UP (ref 1.6–2.6)
MCHC RBC-ENTMCNC: 29.6 PG — SIGNIFICANT CHANGE UP (ref 27–34)
MCHC RBC-ENTMCNC: 31.3 % — LOW (ref 32–36)
MCV RBC AUTO: 94.8 FL — SIGNIFICANT CHANGE UP (ref 80–100)
MONOCYTES # BLD AUTO: 0.53 K/UL — SIGNIFICANT CHANGE UP (ref 0–0.9)
MONOCYTES NFR BLD AUTO: 3.2 % — SIGNIFICANT CHANGE UP (ref 2–14)
NEUTROPHILS # BLD AUTO: 13.67 K/UL — HIGH (ref 1.8–7.4)
NEUTROPHILS NFR BLD AUTO: 81.3 % — HIGH (ref 43–77)
NRBC # FLD: 0 K/UL — SIGNIFICANT CHANGE UP (ref 0–0)
PHOSPHATE SERPL-MCNC: 3.4 MG/DL — SIGNIFICANT CHANGE UP (ref 2.5–4.5)
PLATELET # BLD AUTO: 279 K/UL — SIGNIFICANT CHANGE UP (ref 150–400)
PMV BLD: 10.6 FL — SIGNIFICANT CHANGE UP (ref 7–13)
POTASSIUM SERPL-MCNC: 4.4 MMOL/L — SIGNIFICANT CHANGE UP (ref 3.5–5.3)
POTASSIUM SERPL-SCNC: 4.4 MMOL/L — SIGNIFICANT CHANGE UP (ref 3.5–5.3)
RBC # BLD: 4.25 M/UL — SIGNIFICANT CHANGE UP (ref 4.2–5.8)
RBC # FLD: 14.1 % — SIGNIFICANT CHANGE UP (ref 10.3–14.5)
SODIUM SERPL-SCNC: 139 MMOL/L — SIGNIFICANT CHANGE UP (ref 135–145)
WBC # BLD: 16.8 K/UL — HIGH (ref 3.8–10.5)
WBC # FLD AUTO: 16.8 K/UL — HIGH (ref 3.8–10.5)

## 2019-11-27 PROCEDURE — 99233 SBSQ HOSP IP/OBS HIGH 50: CPT

## 2019-11-27 PROCEDURE — 71275 CT ANGIOGRAPHY CHEST: CPT | Mod: 26

## 2019-11-27 RX ORDER — ASPIRIN/CALCIUM CARB/MAGNESIUM 324 MG
1 TABLET ORAL
Qty: 0 | Refills: 0 | DISCHARGE
Start: 2019-11-27

## 2019-11-27 RX ORDER — DILTIAZEM HCL 120 MG
1 CAPSULE, EXT RELEASE 24 HR ORAL
Qty: 30 | Refills: 0
Start: 2019-11-27 | End: 2019-12-26

## 2019-11-27 RX ORDER — GUAIFENESIN/DEXTROMETHORPHAN 600MG-30MG
10 TABLET, EXTENDED RELEASE 12 HR ORAL
Qty: 0 | Refills: 0 | DISCHARGE
Start: 2019-11-27

## 2019-11-27 RX ADMIN — Medication 60 MILLIGRAM(S): at 00:30

## 2019-11-27 RX ADMIN — Medication 60 MILLIGRAM(S): at 17:54

## 2019-11-27 RX ADMIN — Medication 60 MILLIGRAM(S): at 05:20

## 2019-11-27 RX ADMIN — PIPERACILLIN AND TAZOBACTAM 25 GRAM(S): 4; .5 INJECTION, POWDER, LYOPHILIZED, FOR SOLUTION INTRAVENOUS at 05:19

## 2019-11-27 RX ADMIN — Medication 1 APPLICATION(S): at 12:02

## 2019-11-27 RX ADMIN — Medication 400 INTERNATIONAL UNIT(S): at 12:01

## 2019-11-27 RX ADMIN — Medication 60 MILLIGRAM(S): at 12:01

## 2019-11-27 RX ADMIN — PIPERACILLIN AND TAZOBACTAM 25 GRAM(S): 4; .5 INJECTION, POWDER, LYOPHILIZED, FOR SOLUTION INTRAVENOUS at 13:06

## 2019-11-27 RX ADMIN — Medication 40 MILLIGRAM(S): at 05:20

## 2019-11-27 RX ADMIN — Medication 81 MILLIGRAM(S): at 12:01

## 2019-11-27 NOTE — DISCHARGE NOTE PROVIDER - NSDCCPCAREPLAN_GEN_ALL_CORE_FT
PRINCIPAL DISCHARGE DIAGNOSIS  Diagnosis: Pneumonia  Assessment and Plan of Treatment: Continue Augmentin for 3  days until all pill are gone, seek medical advice if fever comes back or illness worsens, CT Scan of your chest must be done in 4-6 weeks, Follow up with your Primary care doctor within 1 week      SECONDARY DISCHARGE DIAGNOSES  Diagnosis: Atrial flutter with rapid ventricular response  Assessment and Plan of Treatment: Continue your medications as prescribed, avoid caffeine, Follow up with your Primary care docotor or Cardiologist in 1 week    Diagnosis: Atrial flutter  Assessment and Plan of Treatment:

## 2019-11-27 NOTE — DISCHARGE NOTE NURSING/CASE MANAGEMENT/SOCIAL WORK - NSDCDMETYPESERV_GEN_ALL_CORE_FT
Oxygen concentrator and portable tank Oxygen concentrator and portable tank to be delivered to bedside this afternoon. [employer] will call Patient's friend to deliver Oxygen concentrator and oxygen supplies.

## 2019-11-27 NOTE — PROGRESS NOTE ADULT - PROBLEM SELECTOR PLAN 1
Multifocal community acquired PNA, confirmed on chest CT  Pt hypoxic to 80s, given persistent hypoxia despite Abx, will obtain CTA to r/o PE  c/w Zosyn for now, will transition to po in the AM  c/w Trial of prednisone 40mg daily for CAP in conjunction with abx  Blood cultures NGTD  Hycodan and Robitussin PRN for cough  RVP negative

## 2019-11-27 NOTE — PROGRESS NOTE ADULT - REASON FOR ADMISSION
Pneumonia and Atrial flutter

## 2019-11-27 NOTE — DISCHARGE NOTE PROVIDER - HOSPITAL COURSE
64 year old male with no significant past medical history presented for left sided chest pain and cough for the past 4 days. Patient reported diffuse left sided chest and midaxillary pain at rest. The pain was sharp, non-radiating, not reproducible, not exertional, not associated with food. Pain was a 6/10 lasting for and intermittent since onset. He has also had a productive cough with white sputum and SOB for the past 4 days. SOB worse with lying flat and walking. Nothing makes the cough better or worse. Admitted associated symptoms of fever, chills, diaphoresis, myalgias, nausea, loss of appetite and red urine all starting 4 days ago. Patient reports going to an urgent care 11/21. They told him he had pneumonia and sent him to the ED to get antibiotics. Denies PND, orthopnea, palpitations, lightheadedness, dizziness, vision changes, syncope, increased lower extremity edema, anorexia, weight changes (loss or gain), night sweats, generalized fatigue, abdominal pain, V/C/D BRBPR, melena, urinary frequency, dysuria, back pain and wheezing.        On admission, 64 year old male with no significant past medical history presented for left sided chest pain and cough for the past 4 days. Patient reported diffuse left sided chest and midaxillary pain at rest. The pain was sharp, non-radiating, not reproducible, not exertional, not associated with food. Pain was a 6/10 lasting for and intermittent since onset. He has also had a productive cough with white sputum and SOB for the past 4 days. SOB worse with lying flat and walking. Nothing makes the cough better or worse. Admitted associated symptoms of fever, chills, diaphoresis, myalgias, nausea, loss of appetite and red urine all starting 4 days ago. Patient reports going to an urgent care 11/21. They told him he had pneumonia and sent him to the ED to get antibiotics. Denies PND, orthopnea, palpitations, lightheadedness, dizziness, vision changes, syncope, increased lower extremity edema, anorexia, weight changes (loss or gain), night sweats, generalized fatigue, abdominal pain, V/C/D BRBPR, melena, urinary frequency, dysuria, back pain and wheezing.        On admission, chest x-ray showed patchy opacities throughout the left lung consistent with multifocal pneumonia. Patient was given a course of ceftriaxone and azithromycin for 3 days (11/22-11/24).  Antibiotic course was broadened to Zosyn on 11/24 due to non-improved clinical status.  Prednisone was added and blood cultures were NGTD.  CT chest was done on 11/24 and showed left upper and lower lobe ground glass opacities with interlobular septal thickening, nonspecific finding, but likely represents infection.                          Multifocal PNA    -Ceftriaxone and azithormycin (11/22-11/24)    -Broadened to Zosyn on 11/24 due to non-improved clinical status    -Prednisone added -Blood cultures NGTD    -CXR: Patchy opacities throughout the left lung consistent with multifocal pneumonia    -11/24 CT chest: Left upper and lower lobe ground was opacities and interlobular septal thickening.  Nonspecific finding, however likely represents infection.    -11/27 CTA chest - no evidence of PE         New a-flutter CHADVASc score 0    -trop=8-->9,     -ekg: Aflutter with variable av block @ 130 bpm    -Started on eliquis. Eliquis stopepd for hematuria. No need for AC. Low chadsvasc    -s/p Cardizem gtt, on Cardizem PO> increased to 60 mg q 6 hrs     -Echo- Grossly normal left ventricular systolic function    -Plan for OMI/DCCV however spontaneous conversion to NSR     -EP following         Asymptomatic bacturia w hematuria    -UCx pending     -Hematuria 2/2 Eliquis?     -Eliquis held per Dr. Ruiz     -H/H stable 64 year old male with no significant past medical history presented for left sided chest pain and cough for the past 4 days. Patient reported diffuse left sided chest and midaxillary pain at rest. The pain was sharp, non-radiating, not reproducible, not exertional, not associated with food. Pain was a 6/10 lasting for and intermittent since onset. He has also had a productive cough with white sputum and SOB for the past 4 days. SOB worse with lying flat and walking. Nothing makes the cough better or worse. Admitted associated symptoms of fever, chills, diaphoresis, myalgias, nausea, loss of appetite and red urine all starting 4 days ago. Patient reports going to an urgent care 11/21. They told him he had pneumonia and sent him to the ED to get antibiotics. Denies PND, orthopnea, palpitations, lightheadedness, dizziness, vision changes, syncope, increased lower extremity edema, anorexia, weight changes (loss or gain), night sweats, generalized fatigue, abdominal pain, V/C/D BRBPR, melena, urinary frequency, dysuria, back pain and wheezing.        On admission, chest x-ray showed patchy opacities throughout the left lung consistent with multifocal pneumonia. Patient was given a course of ceftriaxone and azithromycin for 3 days (11/22-11/24).  Antibiotic course was broadened to Zosyn on 11/24 due to non-improved clinical status.  Prednisone was added and blood cultures were NGTD.  CT chest was done on 11/24 and showed left upper and lower lobe ground glass opacities with interlobular septal thickening, nonspecific finding, but likely represents infection. CTA chest on 11/27 showed no evidence of PE. On EKG, patient found to have aflutter with variable av block at 130 bpm.  Patient was started on cardizem drip, but then transitioned to cardizem PO at 60 mg q6h.  Echo showed grossly normal LV systolic function.  Patient was planned for OMI/DCCV, however he spontaneously converted to NSR.  Patient was also found to have asymptomatic bacturia w/ hematuria.  Eliquis was held per Dr. Ruiz due to thought that hematuria possibly 2/2 eliquis.  H/H was stable.            INCOMPLETE 64 year old male with no significant past medical history presented for left sided chest pain and cough for the past 4 days. Patient reported diffuse left sided chest and midaxillary pain at rest. The pain was sharp, non-radiating, not reproducible, not exertional, not associated with food. Pain was a 6/10 lasting for and intermittent since onset. He has also had a productive cough with white sputum and SOB for the past 4 days. SOB worse with lying flat and walking. Nothing makes the cough better or worse. Admitted associated symptoms of fever, chills, diaphoresis, myalgias, nausea, loss of appetite and red urine all starting 4 days ago. Patient reports going to an urgent care 11/21. They told him he had pneumonia and sent him to the ED to get antibiotics. Denies PND, orthopnea, palpitations, lightheadedness, dizziness, vision changes, syncope, increased lower extremity edema, anorexia, weight changes (loss or gain), night sweats, generalized fatigue, abdominal pain, V/C/D BRBPR, melena, urinary frequency, dysuria, back pain and wheezing.        On admission, chest x-ray showed patchy opacities throughout the left lung consistent with multifocal pneumonia. Patient was given a course of ceftriaxone and azithromycin for 3 days (11/22-11/24).  Antibiotic course was broadened to Zosyn on 11/24 due to non-improved clinical status.  Prednisone was added and blood cultures were NGTD.  CT chest was done on 11/24 and showed left upper and lower lobe ground glass opacities with interlobular septal thickening, nonspecific finding, but likely represents infection. CTA chest on 11/27 showed no evidence of PE. On EKG, patient found to have aflutter with variable av block at 130 bpm.  Patient was started on cardizem drip, but then transitioned to cardizem PO at 60 mg q6h.  Echo showed grossly normal LV systolic function.  Patient was planned for OMI/DCCV, however he spontaneously converted to NSR.  Patient was also found to have asymptomatic bacturia w/ hematuria.  Eliquis was held per Dr. Ruiz due to thought that hematuria possibly 2/2 eliquis.  H/H was stable.            11/27/19 CT Angio of Chest showed:     No pulmonary embolus     Stable bilateral left greater than the right patchy opacities, of     undetermined etiology. Recommend follow-up to resolution.        Outpatient CT scan recommended in 4-6 weeks and continuing Augmentin for 3 more days        Patient stable for discharge, discussed with Dr. Ochoa who recommended the follow up CT scan and Augmentin    Pt insists that Oxygen has been delivered to his house.

## 2019-11-27 NOTE — DISCHARGE NOTE NURSING/CASE MANAGEMENT/SOCIAL WORK - PATIENT PORTAL LINK FT
You can access the FollowMyHealth Patient Portal offered by Health system by registering at the following website: [email or web address]. By joining [facility]’s FollowMyHealth portal, you will also be able to view your health information using other applications (apps) compatible with our system.

## 2019-11-27 NOTE — DISCHARGE NOTE PROVIDER - NSDCMRMEDTOKEN_GEN_ALL_CORE_FT
collagenase 250 units/g topical ointment: Apply topically to affected area once a day  vitamin E 400 intl units oral capsule: 1 cap(s) orally once a day aspirin 81 mg oral delayed release tablet: 1 tab(s) orally once a day  Augmentin 500 mg-125 mg oral tablet: 1 milligram(s) orally every 12 hours   collagenase 250 units/g topical ointment: Apply topically to affected area once a day  dilTIAZem 240 mg/24 hours oral tablet, extended release: 1 tab(s) orally once a day   guaifenesin-dextromethorphan 100 mg-10 mg/5 mL oral liquid: 10 milliliter(s) orally every 6 hours, As needed, Cough  vitamin E 400 intl units oral capsule: 1 cap(s) orally once a day

## 2019-11-27 NOTE — PROGRESS NOTE ADULT - SUBJECTIVE AND OBJECTIVE BOX
[facility] Division of Hospital Medicine  Bryan Ochoa MD  Pager [phone]      Patient is a 64y old  Male who presents with a chief complaint of Pneumonia and Atrial flutter (26 Nov 2019 15:45)      SUBJECTIVE / OVERNIGHT EVENTS: Pt still having VELÁSQUEZ. Improving cough    MEDICATIONS  (STANDING):  aspirin enteric coated 81 milliGRAM(s) Oral daily  collagenase Ointment 1 Application(s) Topical daily  diltiazem    Tablet 60 milliGRAM(s) Oral every 6 hours  piperacillin/tazobactam IVPB.. 3.375 Gram(s) IV Intermittent every 8 hours  predniSONE   Tablet 40 milliGRAM(s) Oral daily  vitamin E 400 International Unit(s) Oral daily    MEDICATIONS  (PRN):  acetaminophen   Tablet .. 650 milliGRAM(s) Oral every 6 hours PRN Mild Pain (1 - 3)  albuterol/ipratropium for Nebulization 3 milliLiter(s) Nebulizer every 6 hours PRN Shortness of Breath and/or Wheezing  guaifenesin/dextromethorphan  Syrup 10 milliLiter(s) Oral every 6 hours PRN Cough  hydrocodone/homatropine Syrup 5 milliLiter(s) Oral every 6 hours PRN severe pain with cough  sodium chloride 0.65% Nasal 1 Spray(s) Both Nostrils three times a day PRN Nasal Congestion      CAPILLARY BLOOD GLUCOSE        I&O's Summary      PHYSICAL EXAM:  Vital Signs Last 24 Hrs  T(C): 36.7 (27 Nov 2019 11:55), Max: 37 (26 Nov 2019 20:18)  T(F): 98 (27 Nov 2019 11:55), Max: 98.6 (26 Nov 2019 20:18)  HR: 89 (27 Nov 2019 11:55) (80 - 89)  BP: 124/66 (27 Nov 2019 11:55) (124/66 - 148/81)  BP(mean): --  RR: 17 (27 Nov 2019 11:55) (17 - 19)  SpO2: 95% (27 Nov 2019 11:55) (82% - 95%)  CONSTITUTIONAL: NAD  EYES: PERRLA; conjunctiva and sclera clear  ENMT: mmm  NECK: Supple, no palpable masses; no thyromegaly  RESPIRATORY: Normal respiratory effort; lungs are clear to auscultation bilaterally  CARDIOVASCULAR: Regular rate and rhythm, + S1 and S2  ABDOMEN: Nontender to palpation, normoactive bowel sounds, no rebound/guarding  MUSCULOSKELETAL:  no clubbing or cyanosis of digits;   PSYCH: A+O   NEUROLOGY: no gross sensory deficits   SKIN: No rashes;     LABS:                        12.6   16.80 )-----------( 279      ( 27 Nov 2019 07:45 )             40.3     11-27    139  |  102  |  24<H>  ----------------------------<  124<H>  4.4   |  24  |  0.84    Ca    8.6      27 Nov 2019 07:45  Phos  3.4     11-27  Mg     2.2     11-27

## 2019-11-27 NOTE — DISCHARGE NOTE PROVIDER - NSDCFUSCHEDAPPT_GEN_ALL_CORE_FT
[facility] ; 12/17/2019 ; [facility] [id number] [facility] ; 12/17/2019 ; [facility] [phone] [id number] [facility] ; 12/17/2019 ; [facility] [phone] [id number] [facility] ; 12/17/2019 ; [facility] [phone] [id number]

## 2019-12-17 ENCOUNTER — APPOINTMENT (OUTPATIENT)
Dept: ELECTROPHYSIOLOGY | Facility: CLINIC | Age: 64
End: 2019-12-17

## 2019-12-30 NOTE — DISCHARGE NOTE PROVIDER - CARE PROVIDER_API CALL
Subjective     Kathrine Isaac is a 63 year old male who presents to clinic today for the following health issues:    HPI   Diabetes Follow-up    How often are you checking your blood sugar? A few times a week  What time of day are you checking your blood sugars (select all that apply)?  Before meals and After meals  Have you had any blood sugars above 200?  Yes once  Have you had any blood sugars below 70?  No    What symptoms do you notice when your blood sugar is low?  None    What concerns do you have today about your diabetes? None     Do you have any of these symptoms? (Select all that apply)  Weight loss     Have you had a diabetic eye exam in the last 12 months? Yes- Date of last eye exam: 3 months ago  Patient has lost 13# since June- attributes this to stress (loss of son), intentionally cutting back on food and exercisign more.  He is feeling well. No concerns.  Diabetes Management Resources    Hyperlipidemia Follow-Up      Are you regularly taking any medication or supplement to lower your cholesterol?   No    Are you having muscle aches or other side effects that you think could be caused by your cholesterol lowering medication?  No    Hypertension Follow-up      Do you check your blood pressure regularly outside of the clinic? Yes     Are you following a low salt diet? Yes    Are your blood pressures ever more than 140 on the top number (systolic) OR more   than 90 on the bottom number (diastolic), for example 140/90? No    BP Readings from Last 2 Encounters:   12/30/19 130/85   09/26/19 124/88     Hemoglobin A1C (%)   Date Value   12/30/2019 6.7 (H)   06/14/2019 9.2 (H)     LDL Cholesterol Calculated (mg/dL)   Date Value   11/07/2018 32   11/17/2017 21         How many servings of fruits and vegetables do you eat daily?  2-3    On average, how many sweetened beverages do you drink each day (Examples: soda, juice, sweet tea, etc.  Do NOT count diet or artificially sweetened beverages)?   0    How many  days per week do you miss taking your medication? 0      Patient Active Problem List   Diagnosis     Glaucoma suspect     Type 2 diabetes mellitus with retinopathy, without long-term current use of insulin, macular edema presence unspecified, unspecified laterality, unspecified retinopathy severity (H)     Hyperlipidemia LDL goal <100     Essential hypertension     Advance care planning     Diverticulosis of large intestine without hemorrhage     Obesity, Class I, BMI 30.0-34.9 (see actual BMI)     Mechanical low back pain     Hx of LASIK     Past Surgical History:   Procedure Laterality Date     COLONOSCOPY N/A 1/14/2016    Procedure: COLONOSCOPY;  Surgeon: Ventura Monge MD;  Location: [facility] OR     LASIK BILATERAL       ROTATOR CUFF REPAIR RT/LT  1992     SURGICAL HISTORY OF -       uvula remove        Social History     Tobacco Use     Smoking status: Never Smoker     Smokeless tobacco: Never Used   Substance Use Topics     Alcohol use: Yes     Family History   Problem Relation Age of Onset     Diabetes Mother      Glaucoma No family hx of      Macular Degeneration No family hx of          Current Outpatient Medications   Medication Sig Dispense Refill     alcohol swab prep pads Use to swab area of injection/nkechi as directed. 100 each 3     aspirin (ASPIRIN LOW DOSE) 81 MG EC tablet Take 1 tablet (81 mg) by mouth daily 90 tablet 3     atorvastatin (LIPITOR) 20 MG tablet Take 1 tablet (20 mg) by mouth daily 90 tablet 1     blood glucose (NO BRAND SPECIFIED) test strip Use to test blood sugar 1 times daily or as directed. To accompany: Blood Glucose Monitor Brands: per insurance. 100 strip 6     blood glucose (NO BRAND SPECIFIED) test strip Use to test blood sugars once times daily or as directed 1 Box 11     blood glucose calibration (NO BRAND SPECIFIED) solution To accompany: Blood Glucose Monitor Brands: per insurance. 1 Bottle 3     blood glucose monitoring (NO BRAND SPECIFIED) meter device kit Use to  test blood sugar 1 times daily or as directed. Preferred blood glucose meter/supplies to accompany: Monitor Brands: per insurance. 1 kit 0     diclofenac (VOLTAREN) 75 MG EC tablet Take 1 tablet (75 mg) by mouth 2 times daily as needed 40 tablet 1     empagliflozin (JARDIANCE) 10 MG TABS tablet Take 1 tablet (10 mg) by mouth daily 90 tablet 3     gabapentin (NEURONTIN) 300 MG capsule Take 1 capsule (300 mg) by mouth At Bedtime 30 capsule 0     glimepiride (AMARYL) 4 MG tablet TAKE ONE TABLET BY MOUTH EVERY MORNING BEFORE BREAKFAST 90 tablet 3     hydrochlorothiazide (HYDRODIURIL) 12.5 MG tablet Take 1 tablet (12.5 mg) by mouth daily 90 tablet 3     latanoprost (XALATAN) 0.005 % ophthalmic solution Place 1 drop into both eyes daily 1 Bottle 11     lisinopril (PRINIVIL/ZESTRIL) 40 MG tablet Take 1 tablet (40 mg) by mouth daily 90 tablet 3     metFORMIN (GLUCOPHAGE-XR) 500 MG 24 hr tablet Take 4 tablets (2,000 mg) by mouth daily (with dinner) 360 tablet 3     methocarbamol (ROBAXIN) 500 MG tablet Take 1 tablet (500 mg) by mouth 3 times daily as needed for muscle spasms 30 tablet 1     naproxen (NAPROSYN) 500 MG tablet TAKE ONE TABLET BY MOUTH TWICE A DAY AS NEEDED FOR MODERATE PAIN 90 tablet 1     ORDER FOR DME, SET TO LOCAL PRINT, Equipment being ordered: wrist quick fit [id number] 1 each 0     order for DME Equipment being ordered: One touch lancets, test strips.  Patient to check sugars once times daily, 90 day supply for test strips and lancets, refill 3 times 1 Device 0     order for DME Equipment being ordered: Glucometer per insurance preference, lancets, test strips.  Patient to check sugars two times daily, 90 day supply for test strips and lancets, refill 3 times 1 Device 11     order for DME Equipment being ordered: Glucometer Accucheck Sulema, lancets, test strips.  Patient to check sugars bid times daily, 90 day supply for test strips and lancets, refill 6 times 1 Device 0     order for DME Equipment being  "ordered: lancets for one touch glucometer for once daily testing 1 Box 6     sitagliptin (JANUVIA) 100 MG tablet Take 1 tablet (100 mg) by mouth daily 90 tablet 3     thin (NO BRAND SPECIFIED) lancets Use with lanceting device. To accompany: Blood Glucose Monitor Brands: per insurance. 200 each 6     Recent Labs   Lab Test 12/30/19  0938 06/14/19  0937 02/13/19  0943 11/07/18  0836 11/07/18  0835  11/17/17  0944  10/26/16  0940  11/11/15  1040 05/15/15  0927   A1C 6.7* 9.2* 7.4* 8.0*  --    < > 6.4*   < > 9.9*   < > 8.0* 7.0*   LDL  --   --   --  32  --   --  21  --  112*  --  61 Cannot estimate LDL when triglyceride exceeds 400 mg/dL   HDL  --   --   --  41  --   --  44  --  48  --  46 44   TRIG  --   --   --  160*  --   --  166*  --  199*  --  291* 428*   ALT  --   --   --   --  46  --  42  --   --   --  49 49   CR  --   --  0.98 0.82  --   --  0.92  --   --    < > 0.81 0.88   GFRESTIMATED  --   --  82 >90  --   --  83  --   --    < > >90  Non  GFR Calc   89   GFRESTBLACK  --   --  >90 >90  --   --  >90  --   --    < > >90   GFR Calc   >90   GFR Calc     POTASSIUM  --   --  4.3 4.3  --   --  4.1  --   --    < > 4.6 4.8   TSH  --   --   --   --   --   --  1.68  --   --   --   --  1.40    < > = values in this interval not displayed.      BP Readings from Last 3 Encounters:   12/30/19 130/85   09/26/19 124/88   09/04/19 135/87    Wt Readings from Last 3 Encounters:   12/30/19 85.7 kg (189 lb)   09/26/19 88 kg (194 lb)   09/04/19 88 kg (194 lb)           Reviewed and updated as needed this visit by Provider         Review of Systems   ROS COMP: Constitutional, HEENT, cardiovascular, pulmonary, gi and gu systems are negative, except as otherwise noted.      Objective    /85 (BP Location: Left arm, Patient Position: Chair, Cuff Size: Adult Large)   Pulse 74   Temp 98  F (36.7  C) (Oral)   Ht 1.664 m (5' 5.5\")   Wt 85.7 kg (189 lb)   SpO2 98%   BMI 30.97 kg/m  "   Body mass index is 30.97 kg/m .  Physical Exam   GENERAL: healthy, alert and no distress  EYES: Eyes grossly normal to inspection, PERRL and conjunctivae and sclerae normal  HENT: ear canals and TM's normal, nose and mouth without ulcers or lesions  NECK: no adenopathy, no asymmetry, masses, or scars and thyroid normal to palpation  RESP: lungs clear to auscultation - no rales, rhonchi or wheezes  CV: regular rate and rhythm, normal S1 S2, no S3 or S4, no murmur, click or rub, no peripheral edema and peripheral pulses strong  ABDOMEN: soft, nontender, no hepatosplenomegaly, no masses and bowel sounds normal   (male): normal male genitalia without lesions or urethral discharge, no hernia  MS: no gross musculoskeletal defects noted, no edema  SKIN: no suspicious lesions or rashes  NEURO: Normal strength and tone, mentation intact and speech normal  PSYCH: mentation appears normal, affect normal/bright  LYMPH: no cervical, supraclavicular, axillary, or inguinal adenopathy    Diagnostic Test Results:  Labs reviewed in Epic  Results for orders placed or performed in visit on 12/30/19 (from the past 24 hour(s))   Hemoglobin A1c   Result Value Ref Range    Hemoglobin A1C 6.7 (H) 0 - 5.6 %           Assessment & Plan     1. Type 2 diabetes mellitus with retinopathy, without long-term current use of insulin, macular edema presence unspecified, unspecified laterality, unspecified retinopathy severity (H)  Well controlled, continue current management.  - Hemoglobin A1c  - Comprehensive metabolic panel  - TSH with free T4 reflex  - Albumin Random Urine Quantitative with Creat Ratio  - glimepiride (AMARYL) 4 MG tablet; TAKE ONE TABLET BY MOUTH EVERY MORNING BEFORE BREAKFAST  Dispense: 90 tablet; Refill: 3  - empagliflozin (JARDIANCE) 10 MG TABS tablet; Take 1 tablet (10 mg) by mouth daily  Dispense: 90 tablet; Refill: 3  - metFORMIN (GLUCOPHAGE-XR) 500 MG 24 hr tablet; Take 4 tablets (2,000 mg) by mouth daily (with dinner)   "Dispense: 360 tablet; Refill: 3  - sitagliptin (JANUVIA) 100 MG tablet; Take 1 tablet (100 mg) by mouth daily  Dispense: 90 tablet; Refill: 3    2. Hyperlipidemia LDL goal <100  Continue current management, adjust Lipitor as indicated.  - Comprehensive metabolic panel  - Lipid Profile (Chol, Trig, HDL, LDL calc)  - atorvastatin (LIPITOR) 20 MG tablet; Take 1 tablet (20 mg) by mouth daily  Dispense: 90 tablet; Refill: 1      3. Essential hypertension  Well controlled, continue current management.  - Comprehensive metabolic panel  - Albumin Random Urine Quantitative with Creat Ratio  - lisinopril (PRINIVIL/ZESTRIL) 40 MG tablet; Take 1 tablet (40 mg) by mouth daily  Dispense: 90 tablet; Refill: 3  - hydrochlorothiazide (HYDRODIURIL) 12.5 MG tablet; Take 1 tablet (12.5 mg) by mouth daily  Dispense: 90 tablet; Refill: 3  4. Arthritis of left knee  Refilled Voltaren for prn use.  - diclofenac (VOLTAREN) 75 MG EC tablet; Take 1 tablet (75 mg) by mouth 2 times daily as needed  Dispense: 40 tablet; Refill: 1    5. Lumbar disc herniation with radiculopathy  Refilled voltaren  - diclofenac (VOLTAREN) 75 MG EC tablet; Take 1 tablet (75 mg) by mouth 2 times daily as needed  Dispense: 40 tablet; Refill: 1    6. Glaucoma suspect of both eyes  Due for eye exam- he should get Latanoprost from Ophthalmology/Optometry- referral placed.    7. Encounter for screening for HIV    - HIV Screening    8. Need for immunization against influenza    - INFLUENZA VACCINE IM > 6 MONTHS VALENT IIV4 [35308]  - VACCINE ADMINISTRATION, INITIAL     BMI:   Estimated body mass index is 30.97 kg/m  as calculated from the following:    Height as of this encounter: 1.664 m (5' 5.5\").    Weight as of this encounter: 85.7 kg (189 lb).   Weight management plan: Discussed healthy diet and exercise guidelines        Work on weight loss  Regular exercise  See Patient Instructions    No follow-ups on file.    ABHISHEK [facility] " [place]         Erwin Richards (DPM)  Surgery  [address], [address]  [place] [address]  Phone: [phone]  Fax: [phone]  Follow Up Time:

## 2020-07-22 NOTE — PROGRESS NOTE ADULT - PROBLEM SELECTOR PROBLEM 5
What Type Of Note Output Would You Prefer (Optional)?: Standard Output How Severe Are Your Spot(S)?: mild Have Your Spot(S) Been Treated In The Past?: has not been treated Hpi Title: Evaluation of Skin Lesions Additional History: Knot on sides of scalp get enlarged but patient believes the C-Pap strap rubs. Check under eyelids. Need for prophylactic measure

## 2020-10-28 ENCOUNTER — OUTPATIENT (OUTPATIENT)
Dept: OUTPATIENT SERVICES | Facility: HOSPITAL | Age: 65
LOS: 1 days | End: 2020-10-28
Payer: COMMERCIAL

## 2020-10-28 ENCOUNTER — APPOINTMENT (OUTPATIENT)
Dept: CT IMAGING | Facility: IMAGING CENTER | Age: 65
End: 2020-10-28
Payer: COMMERCIAL

## 2020-10-28 ENCOUNTER — RESULT REVIEW (OUTPATIENT)
Age: 65
End: 2020-10-28

## 2020-10-28 DIAGNOSIS — Z00.8 ENCOUNTER FOR OTHER GENERAL EXAMINATION: ICD-10-CM

## 2020-10-28 PROCEDURE — 82565 ASSAY OF CREATININE: CPT

## 2020-10-28 PROCEDURE — 74178 CT ABD&PLV WO CNTR FLWD CNTR: CPT | Mod: 26

## 2020-10-28 PROCEDURE — 74178 CT ABD&PLV WO CNTR FLWD CNTR: CPT

## 2021-06-14 NOTE — ED CDU PROVIDER SUBSEQUENT DAY NOTE - CPE EDP NEURO NORM
[FreeTextEntry1] : Patient is a 75 year-old gentleman with known cardiovascular risk factors of hypertension, non-insulin dependent type II diabetes (needs better control and is planning to establish care with endocrinologist), and ESRD on HD (Tues-Thurs-Sat) who presents today in his usual state of health for evaluation prior to possible renal transplant.\par Patient has no chest pains or shortness of breath.\par Patient does not formally exercise, but he walks regularly and he plays pool. \par Patient used to follow with a cardiologist, but he has not seen him recently, and his most recent ischemic evaluation was approximately five years ago (negative stress test).\par \par July 2020 - Patient returns today in his usual state of health. He continues to have dialysis on Wncaolp-Dwcuhvqx-Ieviselc via left forearm AV fistula.\par He and his family have not been sick during the Covid pandemic.\par He does not exercise.\par \par November 2020 - Patient returns today for follow-up after diagnostic left heart catheterization showed diffuse nonobstructive disease. No PCI was performed. \par \par March 2021 - Patient returns today for follow-up. \par He continues to have dialysis via left forearm AV fistula (Fdofcve-Vsfefeeo-Keonrdrc).\par He received the first dose of Pfizer Covid vaccine two weeks ago (his second dose is scheduled for next week). \par \par May 2021 - Patient returns today for follow-up.\par His ophthalmologist is concerned about white contoured hemorrhages in both eyes. Patient has no reported fevers or chills. He saw him last week and has requested that we send blood cultures and check an echocardiogram.\par He continues to have HD via left radial AV fistula (Zgcfnsn-Svzxdzfl-Djkdptpo).\par He has completed his vaccine series for Covid-19 (Pfizer).\par \par PMD: Isabel Martinez MD [phone]\par Nephrologist: Kodi Olivares MD [phone]\par Endocrinologist: \par Ophthalmologist: Kieran Gifford MD [phone] normal...

## 2021-09-24 NOTE — ED ADULT NURSE REASSESSMENT NOTE - NS ED NURSE REASSESS COMMENT FT1
Patients wife, Ronn Huizarar, updated on plan of care at bedside. All questions and concerns addressed at this time. report recd from rn tekesha. patient resting comfortably in stretcher, persistently tachycardic on monitor, denies chest pain or shortness of breath. tele pa drawn and sent. blood cultures drawn and sent per policy as pt meets sepsis criteria. nad noted, bed in lowest position with call bell in reach, patient updated regarding plan of care will ctm closely.

## 2021-11-19 NOTE — PATIENT PROFILE ADULT - NSPROMEDSPATCH_GEN_A_NUR
Routing refill request to provider for review/approval because:  Failed protocol    Miscellaneous Dermatologic Agents Failed    Protocol Details  Refill request is not for Imiquimod, 5-Fluorouracil, or Finasteride          none

## 2022-09-20 NOTE — DISCHARGE NOTE PROVIDER - NSDCCPCAREPLAN_GEN_ALL_CORE_FT
PRINCIPAL DISCHARGE DIAGNOSIS  Diagnosis: Foot ulceration  Assessment and Plan of Treatment: You were seen at [facility] for a wound on your foot. You were given IV antibiotics with improvement of the infection and had an MRI that did not show any infection of the bone.  You are to continue to take the antibiotics as prescribed in the discharge paperwork.   You are also to continue to follow-up with Podiatry in the next 1-2 weeks as in the discharge paperwork.   Please return for persistent fevers of greater than 100.4F, worsening of the wound with yellow/greenish drainage from the wound, worsening or new redness of the foot or ankle, or any other concerns. PRINCIPAL DISCHARGE DIAGNOSIS  Diagnosis: Foot ulceration  Assessment and Plan of Treatment: You were seen at [facility] for a wound on your foot. You were given IV antibiotics with improvement of the infection and had an MRI that did not show any infection of the bone.  You are to continue to take the antibiotics as prescribed in the discharge paperwork. You should also do the dressing changes as instructed for the foot wound as well.   You are also to continue to follow-up with Podiatry in the next 1-2 weeks as in the discharge paperwork Please call for an appointment.   Please return for persistent fevers of greater than 100.4F, worsening of the wound with yellow/greenish drainage from the wound, worsening or new redness of the foot or ankle, or any other concerns. Acitretin Counseling:  I discussed with the patient the risks of acitretin including but not limited to hair loss, dry lips/skin/eyes, liver damage, hyperlipidemia, depression/suicidal ideation, photosensitivity.  Serious rare side effects can include but are not limited to pancreatitis, pseudotumor cerebri, bony changes, clot formation/stroke/heart attack.  Patient understands that alcohol is contraindicated since it can result in liver toxicity and significantly prolong the elimination of the drug by many years.

## 2022-10-03 NOTE — H&P ADULT - NEGATIVE GENERAL SYMPTOMS
no weight loss/no weight gain/no polyuria . no weight loss/no malaise/no weight gain/no polyuria/no polydipsia/no fatigue

## 2022-10-10 ENCOUNTER — INPATIENT (INPATIENT)
Facility: HOSPITAL | Age: 67
LOS: 9 days | Discharge: HOME HEALTH | End: 2022-10-20
Attending: HOSPITALIST | Admitting: HOSPITALIST
Payer: COMMERCIAL

## 2022-10-10 VITALS
RESPIRATION RATE: 15 BRPM | HEART RATE: 105 BPM | OXYGEN SATURATION: 92 % | DIASTOLIC BLOOD PRESSURE: 82 MMHG | TEMPERATURE: 101 F | SYSTOLIC BLOOD PRESSURE: 160 MMHG | HEIGHT: 69 IN

## 2022-10-10 PROCEDURE — 99285 EMERGENCY DEPT VISIT HI MDM: CPT

## 2022-10-10 NOTE — ED ADULT TRIAGE NOTE - CHIEF COMPLAINT QUOTE
presents from UC. for PNA. Pt endorsing SOB/ fevers and upper back pain. Spo2 91% on RA. No complaints of headache, nausea, dizziness, vomiting , fever, chills verbalized. No Pmhx.

## 2022-10-10 NOTE — ED PROVIDER NOTE - SHIFT CHANGE DETAILS
SHERMAN:  Patient signed out to Dr. Edmonds pending admission to hospital.  HD stable at time of signout.

## 2022-10-10 NOTE — ED PROVIDER NOTE - ATTENDING CONTRIBUTION TO CARE
Brief HPI:  68 yo male with PMhx lymphedema presenting with fever and SOB x2 days.      Vitals:   Reviewed    Exam:    GEN:  Non-toxic appearing, non-distressed, speaking full sentences, non-diaphoretic, AAOx3  HEENT:  NCAT, neck supple, EOMI, PERRLA, sclera anicteric, no conjunctival pallor or injection, no stridor, normal voice, no tonsillar exudate, uvula midline  CV:  tachy, irregular, s1/s2 audible, no murmurs, rubs or gallops, peripheral pulses 2+ and symmetric  PULM:  non-labored respirations, no wheeze  ABD:  non distended, non-tender, no rebound, no guarding, negative Georges's sign, bowel sounds normal, no cvat  MSK:  no gross deformity, non-tender extremities and joints, range of motion grossly normal appearing, no extremity edema, extremities warm and well perfused   NEURO:  AAOx3, CN II-XII intact, motor 5/5 in upper and lower extremities bilaterally, sensation grossly intact in extremities and trunk, finger to nose testing wnl, no nystagmus, negative Romberg, no pronator drift, no gait deficit  SKIN:  warm, dry, no rash or vesicles     A/P:  68 yo male with PMhx lymphedema presenting with fever and SOB x2 days.  On nasal cannula.  Afib on monitor, chart history shows history of aflutter during previous admission, not currently on a/c.  Suspect pna given fever, cough.  Lower c/f pe.  Plan for cxr, ivf, abx, heparin.  Dispo pending.

## 2022-10-10 NOTE — ED PROVIDER NOTE - PROGRESS NOTE DETAILS
MD Monique (PGY-1) CXR read as diffuse reticular (unchanged fron 2019), could be pneumonia vs chronic lung disease. Will get CT chest to evaluate further.

## 2022-10-10 NOTE — ED PROVIDER NOTE - NS ED ROS FT
CONST: (+) fevers, no chills, no trauma  EYES: no blurry vision   ENT: no sore throat  CV: no chest pain, (+) LLE swelling  RESP: (+) shortness of breath  ABD: no abdominal pain, no nausea, no vomiting, (+) diarrhea, no melena  : no dysuria, no hematuria, no frequency  MSK: no back pain, no neck pain, no extremity pain  NEURO: no headache, no focal weakness, no dizziness  HEME: no bruising  SKIN: no rash

## 2022-10-10 NOTE — ED PROVIDER NOTE - PHYSICAL EXAMINATION
Const: not in acute distress  Eyes: no conjunctival injection  HEENT: Head NCAT, Moist MM.  Neck: Trachea midline.   CVS: +S1/S2, Peripheral pulses 2+ and equal in all extremities.  RESP: Unlabored respiratory effort. Clear to auscultation bilaterally.  GI: Nontender/Nondistended, No CVA tenderness b/l.   MSK: Normocephalic/Atraumatic, LLE edematous with erythema in third toe to mid foot  Skin: Intact.   Neuro: CNs II-XII grossly intact. Motor & Sensation grossly intact.  Psych: Awake, Alert, & Oriented (AAO) x3.

## 2022-10-10 NOTE — ED PROVIDER NOTE - OBJECTIVE STATEMENT
68 yo male with PMhx lymphedema presenting with fever and SOB x2 days. Patient states that he has been having fever up to 101 and SOB for 2 days. Patient received xray in urgent care, who was concerned for pneumonia. Patient also endorsing 3x nonbloody diarrhea today, as well as darker urine. Patient also has increasing swelling in LLE. Patient does state he has known lymphedema in this leg, but has been getting worse. Patient denies n/v, abdominal pain, CP, and dysuria.

## 2022-10-10 NOTE — ED PROVIDER NOTE - CLINICAL SUMMARY MEDICAL DECISION MAKING FREE TEXT BOX
66 yo male with PMhx lymphedema presenting with fever and SOB x2 days. In triage, Patient was febrile to 101.5, tachycardic, and Spo2 91% on RA. Ddx includes but not limited to pneumonia, possibly PE (will start with DVT study), acute CHF, UTI. Will get CBC, CMP, blood cultures, UA/UC, BNP, legionella/MRSA swab, procal, VBG, RVP, and CXR. 68 yo male with PMhx lymphedema presenting with fever and SOB x2 days. In triage, Patient was febrile to 101.5, tachycardic, and Spo2 91% on RA. Ddx includes but not limited to pneumonia, possibly PE (if CXR clear, will get CTA), acute CHF, UTI. Will get CBC, CMP, blood cultures, UA/UC, BNP, legionella/MRSA swab, procal, VBG, RVP, and CXR.

## 2022-10-11 DIAGNOSIS — Z29.9 ENCOUNTER FOR PROPHYLACTIC MEASURES, UNSPECIFIED: ICD-10-CM

## 2022-10-11 DIAGNOSIS — I89.0 LYMPHEDEMA, NOT ELSEWHERE CLASSIFIED: ICD-10-CM

## 2022-10-11 DIAGNOSIS — I48.91 UNSPECIFIED ATRIAL FIBRILLATION: ICD-10-CM

## 2022-10-11 DIAGNOSIS — A41.9 SEPSIS, UNSPECIFIED ORGANISM: ICD-10-CM

## 2022-10-11 DIAGNOSIS — J18.9 PNEUMONIA, UNSPECIFIED ORGANISM: ICD-10-CM

## 2022-10-11 LAB
ALBUMIN SERPL ELPH-MCNC: 3.7 G/DL — SIGNIFICANT CHANGE UP (ref 3.3–5)
ALP SERPL-CCNC: 78 U/L — SIGNIFICANT CHANGE UP (ref 40–120)
ALT FLD-CCNC: 50 U/L — HIGH (ref 4–41)
ANION GAP SERPL CALC-SCNC: 11 MMOL/L — SIGNIFICANT CHANGE UP (ref 7–14)
ANION GAP SERPL CALC-SCNC: 17 MMOL/L — HIGH (ref 7–14)
ANISOCYTOSIS BLD QL: SLIGHT — SIGNIFICANT CHANGE UP
APPEARANCE UR: CLEAR — SIGNIFICANT CHANGE UP
APTT BLD: 34.9 SEC — SIGNIFICANT CHANGE UP (ref 27–36.3)
APTT BLD: 46.2 SEC — HIGH (ref 27–36.3)
APTT BLD: 55 SEC — HIGH (ref 27–36.3)
AST SERPL-CCNC: 77 U/L — HIGH (ref 4–40)
B PERT DNA SPEC QL NAA+PROBE: SIGNIFICANT CHANGE UP
B PERT+PARAPERT DNA PNL SPEC NAA+PROBE: SIGNIFICANT CHANGE UP
BACTERIA # UR AUTO: ABNORMAL
BASE EXCESS BLDV CALC-SCNC: -0.2 MMOL/L — SIGNIFICANT CHANGE UP (ref -2–3)
BASOPHILS # BLD AUTO: 0 K/UL — SIGNIFICANT CHANGE UP (ref 0–0.2)
BASOPHILS NFR BLD AUTO: 0 % — SIGNIFICANT CHANGE UP (ref 0–2)
BILIRUB SERPL-MCNC: 1.2 MG/DL — SIGNIFICANT CHANGE UP (ref 0.2–1.2)
BILIRUB UR-MCNC: NEGATIVE — SIGNIFICANT CHANGE UP
BLOOD GAS VENOUS COMPREHENSIVE RESULT: SIGNIFICANT CHANGE UP
BORDETELLA PARAPERTUSSIS (RAPRVP): SIGNIFICANT CHANGE UP
BUN SERPL-MCNC: 13 MG/DL — SIGNIFICANT CHANGE UP (ref 7–23)
BUN SERPL-MCNC: 14 MG/DL — SIGNIFICANT CHANGE UP (ref 7–23)
C PNEUM DNA SPEC QL NAA+PROBE: SIGNIFICANT CHANGE UP
CALCIUM SERPL-MCNC: 8 MG/DL — LOW (ref 8.4–10.5)
CALCIUM SERPL-MCNC: 8.1 MG/DL — LOW (ref 8.4–10.5)
CHLORIDE BLDV-SCNC: 97 MMOL/L — SIGNIFICANT CHANGE UP (ref 96–108)
CHLORIDE SERPL-SCNC: 93 MMOL/L — LOW (ref 98–107)
CHLORIDE SERPL-SCNC: 98 MMOL/L — SIGNIFICANT CHANGE UP (ref 98–107)
CO2 BLDV-SCNC: 25.1 MMOL/L — SIGNIFICANT CHANGE UP (ref 22–26)
CO2 SERPL-SCNC: 18 MMOL/L — LOW (ref 22–31)
CO2 SERPL-SCNC: 23 MMOL/L — SIGNIFICANT CHANGE UP (ref 22–31)
COLOR SPEC: SIGNIFICANT CHANGE UP
CREAT ?TM UR-MCNC: 22 MG/DL — SIGNIFICANT CHANGE UP
CREAT SERPL-MCNC: 0.78 MG/DL — SIGNIFICANT CHANGE UP (ref 0.5–1.3)
CREAT SERPL-MCNC: 0.84 MG/DL — SIGNIFICANT CHANGE UP (ref 0.5–1.3)
DIFF PNL FLD: ABNORMAL
EGFR: 96 ML/MIN/1.73M2 — SIGNIFICANT CHANGE UP
EGFR: 98 ML/MIN/1.73M2 — SIGNIFICANT CHANGE UP
EOSINOPHIL # BLD AUTO: 0 K/UL — SIGNIFICANT CHANGE UP (ref 0–0.5)
EOSINOPHIL NFR BLD AUTO: 0 % — SIGNIFICANT CHANGE UP (ref 0–6)
EPI CELLS # UR: 0 /HPF — SIGNIFICANT CHANGE UP (ref 0–5)
FLUAV SUBTYP SPEC NAA+PROBE: SIGNIFICANT CHANGE UP
FLUBV RNA SPEC QL NAA+PROBE: SIGNIFICANT CHANGE UP
GAS PNL BLDV: 127 MMOL/L — LOW (ref 136–145)
GIANT PLATELETS BLD QL SMEAR: PRESENT — SIGNIFICANT CHANGE UP
GLUCOSE BLDV-MCNC: 113 MG/DL — HIGH (ref 70–99)
GLUCOSE SERPL-MCNC: 112 MG/DL — HIGH (ref 70–99)
GLUCOSE SERPL-MCNC: 117 MG/DL — HIGH (ref 70–99)
GLUCOSE UR QL: NEGATIVE — SIGNIFICANT CHANGE UP
HADV DNA SPEC QL NAA+PROBE: SIGNIFICANT CHANGE UP
HCO3 BLDV-SCNC: 24 MMOL/L — SIGNIFICANT CHANGE UP (ref 22–29)
HCOV 229E RNA SPEC QL NAA+PROBE: SIGNIFICANT CHANGE UP
HCOV HKU1 RNA SPEC QL NAA+PROBE: SIGNIFICANT CHANGE UP
HCOV NL63 RNA SPEC QL NAA+PROBE: SIGNIFICANT CHANGE UP
HCOV OC43 RNA SPEC QL NAA+PROBE: SIGNIFICANT CHANGE UP
HCT VFR BLD CALC: 37.9 % — LOW (ref 39–50)
HCT VFR BLD CALC: 39.3 % — SIGNIFICANT CHANGE UP (ref 39–50)
HCT VFR BLDA CALC: 38 % — LOW (ref 39–51)
HGB BLD CALC-MCNC: 12.7 G/DL — LOW (ref 13–17)
HGB BLD-MCNC: 12.7 G/DL — LOW (ref 13–17)
HGB BLD-MCNC: 12.8 G/DL — LOW (ref 13–17)
HMPV RNA SPEC QL NAA+PROBE: SIGNIFICANT CHANGE UP
HPIV1 RNA SPEC QL NAA+PROBE: SIGNIFICANT CHANGE UP
HPIV2 RNA SPEC QL NAA+PROBE: SIGNIFICANT CHANGE UP
HPIV3 RNA SPEC QL NAA+PROBE: SIGNIFICANT CHANGE UP
HPIV4 RNA SPEC QL NAA+PROBE: SIGNIFICANT CHANGE UP
IANC: 7.27 K/UL — SIGNIFICANT CHANGE UP (ref 1.8–7.4)
INR BLD: 1.37 RATIO — HIGH (ref 0.88–1.16)
KETONES UR-MCNC: NEGATIVE — SIGNIFICANT CHANGE UP
LACTATE BLDV-MCNC: 1.4 MMOL/L — SIGNIFICANT CHANGE UP (ref 0.5–2)
LEGIONELLA AG UR QL: NEGATIVE — SIGNIFICANT CHANGE UP
LEUKOCYTE ESTERASE UR-ACNC: NEGATIVE — SIGNIFICANT CHANGE UP
LYMPHOCYTES # BLD AUTO: 0.8 K/UL — LOW (ref 1–3.3)
LYMPHOCYTES # BLD AUTO: 8.7 % — LOW (ref 13–44)
M PNEUMO DNA SPEC QL NAA+PROBE: SIGNIFICANT CHANGE UP
MAGNESIUM SERPL-MCNC: 2.1 MG/DL — SIGNIFICANT CHANGE UP (ref 1.6–2.6)
MANUAL SMEAR VERIFICATION: SIGNIFICANT CHANGE UP
MCHC RBC-ENTMCNC: 29.7 PG — SIGNIFICANT CHANGE UP (ref 27–34)
MCHC RBC-ENTMCNC: 30.5 PG — SIGNIFICANT CHANGE UP (ref 27–34)
MCHC RBC-ENTMCNC: 32.3 GM/DL — SIGNIFICANT CHANGE UP (ref 32–36)
MCHC RBC-ENTMCNC: 33.8 GM/DL — SIGNIFICANT CHANGE UP (ref 32–36)
MCV RBC AUTO: 90.5 FL — SIGNIFICANT CHANGE UP (ref 80–100)
MCV RBC AUTO: 91.8 FL — SIGNIFICANT CHANGE UP (ref 80–100)
MONOCYTES # BLD AUTO: 0.88 K/UL — SIGNIFICANT CHANGE UP (ref 0–0.9)
MONOCYTES NFR BLD AUTO: 9.6 % — SIGNIFICANT CHANGE UP (ref 2–14)
NEUTROPHILS # BLD AUTO: 7.44 K/UL — HIGH (ref 1.8–7.4)
NEUTROPHILS NFR BLD AUTO: 76.5 % — SIGNIFICANT CHANGE UP (ref 43–77)
NEUTS BAND # BLD: 4.3 % — SIGNIFICANT CHANGE UP (ref 0–6)
NITRITE UR-MCNC: NEGATIVE — SIGNIFICANT CHANGE UP
NRBC # BLD: 0 /100 WBCS — SIGNIFICANT CHANGE UP (ref 0–0)
NRBC # FLD: 0 K/UL — SIGNIFICANT CHANGE UP (ref 0–0)
NT-PROBNP SERPL-SCNC: 1224 PG/ML — HIGH
OSMOLALITY SERPL: 276 MOSM/KG — SIGNIFICANT CHANGE UP (ref 275–295)
OSMOLALITY UR: 125 MOSM/KG — SIGNIFICANT CHANGE UP (ref 50–1200)
PCO2 BLDV: 37 MMHG — LOW (ref 42–55)
PH BLDV: 7.42 — SIGNIFICANT CHANGE UP (ref 7.32–7.43)
PH UR: 6.5 — SIGNIFICANT CHANGE UP (ref 5–8)
PHOSPHATE SERPL-MCNC: 2 MG/DL — LOW (ref 2.5–4.5)
PLAT MORPH BLD: NORMAL — SIGNIFICANT CHANGE UP
PLATELET # BLD AUTO: 105 K/UL — LOW (ref 150–400)
PLATELET # BLD AUTO: 107 K/UL — LOW (ref 150–400)
PLATELET COUNT - ESTIMATE: ABNORMAL
PO2 BLDV: 95 MMHG — SIGNIFICANT CHANGE UP
POIKILOCYTOSIS BLD QL AUTO: SLIGHT — SIGNIFICANT CHANGE UP
POTASSIUM BLDV-SCNC: 3.8 MMOL/L — SIGNIFICANT CHANGE UP (ref 3.5–5.1)
POTASSIUM SERPL-MCNC: 3.5 MMOL/L — SIGNIFICANT CHANGE UP (ref 3.5–5.3)
POTASSIUM SERPL-MCNC: 3.9 MMOL/L — SIGNIFICANT CHANGE UP (ref 3.5–5.3)
POTASSIUM SERPL-SCNC: 3.5 MMOL/L — SIGNIFICANT CHANGE UP (ref 3.5–5.3)
POTASSIUM SERPL-SCNC: 3.9 MMOL/L — SIGNIFICANT CHANGE UP (ref 3.5–5.3)
POTASSIUM UR-SCNC: 7.3 MMOL/L — SIGNIFICANT CHANGE UP
PROCALCITONIN SERPL-MCNC: 0.89 NG/ML — HIGH (ref 0.02–0.1)
PROT ?TM UR-MCNC: 17 MG/DL — SIGNIFICANT CHANGE UP
PROT SERPL-MCNC: 7.5 G/DL — SIGNIFICANT CHANGE UP (ref 6–8.3)
PROT UR-MCNC: ABNORMAL
PROT/CREAT UR-RTO: 0.8 RATIO — HIGH (ref 0–0.2)
PROTHROM AB SERPL-ACNC: 15.9 SEC — HIGH (ref 10.5–13.4)
RAPID RVP RESULT: SIGNIFICANT CHANGE UP
RBC # BLD: 4.19 M/UL — LOW (ref 4.2–5.8)
RBC # BLD: 4.28 M/UL — SIGNIFICANT CHANGE UP (ref 4.2–5.8)
RBC # FLD: 13.2 % — SIGNIFICANT CHANGE UP (ref 10.3–14.5)
RBC # FLD: 13.4 % — SIGNIFICANT CHANGE UP (ref 10.3–14.5)
RBC BLD AUTO: NORMAL — SIGNIFICANT CHANGE UP
RBC CASTS # UR COMP ASSIST: 1 /HPF — SIGNIFICANT CHANGE UP (ref 0–4)
RSV RNA SPEC QL NAA+PROBE: SIGNIFICANT CHANGE UP
RV+EV RNA SPEC QL NAA+PROBE: SIGNIFICANT CHANGE UP
SAO2 % BLDV: 97.7 % — SIGNIFICANT CHANGE UP
SARS-COV-2 RNA SPEC QL NAA+PROBE: SIGNIFICANT CHANGE UP
SODIUM SERPL-SCNC: 128 MMOL/L — LOW (ref 135–145)
SODIUM SERPL-SCNC: 132 MMOL/L — LOW (ref 135–145)
SODIUM UR-SCNC: <20 MMOL/L — SIGNIFICANT CHANGE UP
SP GR SPEC: 1 — LOW (ref 1.01–1.05)
UROBILINOGEN FLD QL: SIGNIFICANT CHANGE UP
UUN UR-MCNC: 218.2 MG/DL — SIGNIFICANT CHANGE UP
VARIANT LYMPHS # BLD: 0.9 % — SIGNIFICANT CHANGE UP (ref 0–6)
WBC # BLD: 9.21 K/UL — SIGNIFICANT CHANGE UP (ref 3.8–10.5)
WBC # BLD: 9.32 K/UL — SIGNIFICANT CHANGE UP (ref 3.8–10.5)
WBC # FLD AUTO: 9.21 K/UL — SIGNIFICANT CHANGE UP (ref 3.8–10.5)
WBC # FLD AUTO: 9.32 K/UL — SIGNIFICANT CHANGE UP (ref 3.8–10.5)
WBC UR QL: 1 /HPF — SIGNIFICANT CHANGE UP (ref 0–5)

## 2022-10-11 PROCEDURE — 71250 CT THORAX DX C-: CPT | Mod: 26,MA

## 2022-10-11 PROCEDURE — 71046 X-RAY EXAM CHEST 2 VIEWS: CPT | Mod: 26

## 2022-10-11 PROCEDURE — 93971 EXTREMITY STUDY: CPT | Mod: 26,LT

## 2022-10-11 PROCEDURE — 99223 1ST HOSP IP/OBS HIGH 75: CPT | Mod: GC

## 2022-10-11 PROCEDURE — 93306 TTE W/DOPPLER COMPLETE: CPT | Mod: 26

## 2022-10-11 RX ORDER — HEPARIN SODIUM 5000 [USP'U]/ML
INJECTION INTRAVENOUS; SUBCUTANEOUS
Qty: 25000 | Refills: 0 | Status: DISCONTINUED | OUTPATIENT
Start: 2022-10-11 | End: 2022-10-12

## 2022-10-11 RX ORDER — AZITHROMYCIN 500 MG/1
500 TABLET, FILM COATED ORAL EVERY 24 HOURS
Refills: 0 | Status: DISCONTINUED | OUTPATIENT
Start: 2022-10-12 | End: 2022-10-12

## 2022-10-11 RX ORDER — HEPARIN SODIUM 5000 [USP'U]/ML
7500 INJECTION INTRAVENOUS; SUBCUTANEOUS ONCE
Refills: 0 | Status: COMPLETED | OUTPATIENT
Start: 2022-10-11 | End: 2022-10-11

## 2022-10-11 RX ORDER — COLLAGENASE CLOSTRIDIUM HIST. 250 UNIT/G
1 OINTMENT (GRAM) TOPICAL
Qty: 0 | Refills: 0 | DISCHARGE

## 2022-10-11 RX ORDER — ACETAMINOPHEN 500 MG
650 TABLET ORAL ONCE
Refills: 0 | Status: COMPLETED | OUTPATIENT
Start: 2022-10-11 | End: 2022-10-11

## 2022-10-11 RX ORDER — LIDOCAINE 4 G/100G
1 CREAM TOPICAL DAILY
Refills: 0 | Status: DISCONTINUED | OUTPATIENT
Start: 2022-10-11 | End: 2022-10-20

## 2022-10-11 RX ORDER — ACETAMINOPHEN 500 MG
650 TABLET ORAL EVERY 6 HOURS
Refills: 0 | Status: DISCONTINUED | OUTPATIENT
Start: 2022-10-11 | End: 2022-10-20

## 2022-10-11 RX ORDER — HEPARIN SODIUM 5000 [USP'U]/ML
3500 INJECTION INTRAVENOUS; SUBCUTANEOUS EVERY 6 HOURS
Refills: 0 | Status: DISCONTINUED | OUTPATIENT
Start: 2022-10-11 | End: 2022-10-12

## 2022-10-11 RX ORDER — VITAMIN E 100 UNIT
1 CAPSULE ORAL
Qty: 0 | Refills: 0 | DISCHARGE

## 2022-10-11 RX ORDER — HEPARIN SODIUM 5000 [USP'U]/ML
7500 INJECTION INTRAVENOUS; SUBCUTANEOUS EVERY 6 HOURS
Refills: 0 | Status: DISCONTINUED | OUTPATIENT
Start: 2022-10-11 | End: 2022-10-12

## 2022-10-11 RX ORDER — CEFTRIAXONE 500 MG/1
1000 INJECTION, POWDER, FOR SOLUTION INTRAMUSCULAR; INTRAVENOUS ONCE
Refills: 0 | Status: COMPLETED | OUTPATIENT
Start: 2022-10-11 | End: 2022-10-11

## 2022-10-11 RX ORDER — IBUPROFEN 200 MG
600 TABLET ORAL THREE TIMES A DAY
Refills: 0 | Status: DISCONTINUED | OUTPATIENT
Start: 2022-10-11 | End: 2022-10-20

## 2022-10-11 RX ORDER — SODIUM CHLORIDE 9 MG/ML
1000 INJECTION, SOLUTION INTRAVENOUS ONCE
Refills: 0 | Status: COMPLETED | OUTPATIENT
Start: 2022-10-11 | End: 2022-10-11

## 2022-10-11 RX ORDER — AZITHROMYCIN 500 MG/1
500 TABLET, FILM COATED ORAL ONCE
Refills: 0 | Status: COMPLETED | OUTPATIENT
Start: 2022-10-11 | End: 2022-10-11

## 2022-10-11 RX ORDER — CEFTRIAXONE 500 MG/1
1000 INJECTION, POWDER, FOR SOLUTION INTRAMUSCULAR; INTRAVENOUS EVERY 24 HOURS
Refills: 0 | Status: DISCONTINUED | OUTPATIENT
Start: 2022-10-12 | End: 2022-10-12

## 2022-10-11 RX ADMIN — HEPARIN SODIUM 1700 UNIT(S)/HR: 5000 INJECTION INTRAVENOUS; SUBCUTANEOUS at 07:43

## 2022-10-11 RX ADMIN — HEPARIN SODIUM 3500 UNIT(S): 5000 INJECTION INTRAVENOUS; SUBCUTANEOUS at 15:15

## 2022-10-11 RX ADMIN — Medication 650 MILLIGRAM(S): at 12:34

## 2022-10-11 RX ADMIN — HEPARIN SODIUM 7500 UNIT(S): 5000 INJECTION INTRAVENOUS; SUBCUTANEOUS at 07:44

## 2022-10-11 RX ADMIN — HEPARIN SODIUM 1900 UNIT(S)/HR: 5000 INJECTION INTRAVENOUS; SUBCUTANEOUS at 15:13

## 2022-10-11 RX ADMIN — CEFTRIAXONE 100 MILLIGRAM(S): 500 INJECTION, POWDER, FOR SOLUTION INTRAMUSCULAR; INTRAVENOUS at 02:14

## 2022-10-11 RX ADMIN — Medication 650 MILLIGRAM(S): at 10:26

## 2022-10-11 RX ADMIN — HEPARIN SODIUM 2100 UNIT(S)/HR: 5000 INJECTION INTRAVENOUS; SUBCUTANEOUS at 22:32

## 2022-10-11 RX ADMIN — AZITHROMYCIN 500 MILLIGRAM(S): 500 TABLET, FILM COATED ORAL at 03:31

## 2022-10-11 RX ADMIN — HEPARIN SODIUM 3500 UNIT(S): 5000 INJECTION INTRAVENOUS; SUBCUTANEOUS at 22:36

## 2022-10-11 RX ADMIN — LIDOCAINE 1 PATCH: 4 CREAM TOPICAL at 17:55

## 2022-10-11 RX ADMIN — SODIUM CHLORIDE 1000 MILLILITER(S): 9 INJECTION, SOLUTION INTRAVENOUS at 03:30

## 2022-10-11 RX ADMIN — Medication 600 MILLIGRAM(S): at 17:54

## 2022-10-11 RX ADMIN — Medication 650 MILLIGRAM(S): at 02:15

## 2022-10-11 NOTE — H&P ADULT - NSHPPOAPULMEMBOLUS_GEN_A_CORE
62 y/o male with hx of asthma, HLD presents to the ED alongside daughter complaining of foreign body to the right eye since yesterday morning. Pt was cutting a piece of metal and felt a foreign body in his eye. Pt notes pain had been increasing to the point where pt can not open his eye. Pt notes pain is 10/10 in intensity localized to the Pt denies blurry vision, loss of vision. Last tetanus unknown. no

## 2022-10-11 NOTE — H&P ADULT - HISTORY OF PRESENT ILLNESS
68 y/o M with PMH of lymphedema presenting with fever and SOB for the past 2 days.      ED Course:  Vitals: T 101.5 F, , /82, RR 15, SpO2 92% on RA  received tylenol, azithro 500 mg, CTX 1g, 1L LR bolus, started on heparin gtt    CT Chest noncon: Diffuse bilateral groundglass opacities with interlobular septal   thickening and superimposed nonspecific scattered peripheral nodular   consolidations demonstrated. Differential considerations include   multifocal pneumonia. Interstitial lung disease is also a diagnostic   possibility..    CXR:  Diffuse bilateral reticular opacities may represent multifocal pneumonia   versus more likely chronic interstitial lung disease.   66 y/o M with PMH of lymphedema, previous hospitalization in 2019 for pneumonia c/b pAF w/ spontaneous resolutino presenting with fever, SOB, cough, fatigue, diarrhea for the past 2 days. Otherwise well, denies pain.       ED Course:  Vitals: T 101.5 F, , /82, RR 15, SpO2 92% on RA  received tylenol, azithro 500 mg, CTX 1g, 1L LR bolus, started on heparin gtt    CT Chest noncon: Diffuse bilateral groundglass opacities with interlobular septal   thickening and superimposed nonspecific scattered peripheral nodular   consolidations demonstrated. Differential considerations include   multifocal pneumonia. Interstitial lung disease is also a diagnostic   possibility..    CXR:  Diffuse bilateral reticular opacities may represent multifocal pneumonia   versus more likely chronic interstitial lung disease.   66 y/o M with PMH of lymphedema, previous hospitalization in 2019 for pneumonia c/b pAF w/ spontaneous resolutino presenting with fever, SOB, cough, fatigue, diarrhea for the past 2 days. Denies sick contacts at work or at home. Works at [employer] so has high risk for exposure. No co-workers sick. Otherwise well, denies chest pain, shortness of breath prior to this acute episode. Denies worsening leg swelling, although has lymphedema at baseline and his left toe has been slightly more erythematous over last several days. Has had decreased PO intake with illness and noticed his urine was getting darker.     Past smoker: stopped in 1985, no chronic cough or shortness of breath.  Sees a podiatrist and vascular surgeon for his lymphedema. No other medical providers.   Takes no medications at home.     ED Course:  Vitals: T 101.5 F, , /82, RR 15, SpO2 92% on RA  received tylenol, azithro 500 mg, CTX 1g, 1L LR bolus, started on heparin gtt for afib  CT Chest noncon: Diffuse bilateral groundglass opacities with interlobular septal thickening and superimposed nonspecific scattered peripheral nodular consolidations demonstrated. Differential considerations include multifocal pneumonia. Interstitial lung disease is also a diagnostic possibility..    CXR:  Diffuse bilateral reticular opacities may represent multifocal pneumonia versus more likely chronic interstitial lung disease.   68 y/o M with PMH of lymphedema, previous hospitalization in 2019 for pneumonia c/b pAF w/ spontaneous resolutino presenting with fever, SOB, cough, fatigue, diarrhea for the past 2 days. Denies sick contacts at work or at home. Works at [employer] so has high risk for exposure. No co-workers sick. Otherwise well, denies chest pain, shortness of breath prior to this acute episode. Denies worsening leg swelling, although has lymphedema at baseline and his left toe has been slightly more erythematous over last several days. Has had decreased PO intake with illness and noticed his urine was getting darker.     Past smoker: stopped in 1985, no chronic cough or shortness of breath.  Sees a podiatrist and vascular surgeon for his lymphedema. No other medical providers.   Takes no medications at home.     ED Course:  Vitals: T 101.5 F, , /82, RR 15, SpO2 92% on RA  received tylenol, azithro 500 mg, CTX 1g, 1L LR bolus, started on heparin gtt for afib  CT Chest noncon: Diffuse bilateral groundglass opacities with interlobular septal thickening and superimposed nonspecific scattered peripheral nodular consolidations demonstrated. Differential considerations include multifocal pneumonia. Interstitial lung disease is also a diagnostic possibility.

## 2022-10-11 NOTE — H&P ADULT - NSICDXPASTMEDICALHX_GEN_ALL_CORE_FT
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

## 2022-10-11 NOTE — H&P ADULT - NSHPREVIEWOFSYSTEMS_GEN_ALL_CORE
General: + FATIGUE Denies dizziness  Eyes: Denies blurry vision  ENMT: Denies rhinorrhea  Respiratory: + COUGH + DYSPNEA   Cardiovascular: Denies palpitations, CP  Gastrointestinal: + DIARRHEA Denies abd pain, hematochezia, melena  : Denies dysuria, increased freq  Musculoskeletal: Denies edema, joint pain  Endocrine: Denies increased thirst, increased frequency  Allergic/Immunologic: Denies rashes or hives  Neuro: Denies weakness, numbness  Psych: Denies anxiety, depression  All ROS negative unless indicated above

## 2022-10-11 NOTE — H&P ADULT - NSICDXFAMILYHX_GEN_ALL_CORE_FT
FAMILY HISTORY:  Family history of COPD (chronic obstructive pulmonary disease), Mother    Father  Still living? Unknown  Paternal family history of emphysema, Age at diagnosis: Age Unknown    Child  Still living? Unknown  FH: Stephany-Parkinson-White syndrome, Age at diagnosis: Age Unknown

## 2022-10-11 NOTE — H&P ADULT - NSHPPHYSICALEXAM_GEN_ALL_CORE
PHYSICAL EXAM:  GENERAL: NAD, lying in bed comfortably  HEAD:  Atraumatic, Normocephalic  EYES: EOMI, PERRLA, conjunctiva and sclera clear  ENT: Moist mucous membranes  NECK: Supple, No JVD  CHEST/LUNG: Crackles mild diffusely, no wheezing. respirations unlabored   HEART: Regular rate and rhythm; No murmurs, rubs, or gallops  ABDOMEN: Bowel sounds present; Soft, Nontender, Nondistended.   EXTREMITIES:  2+ Peripheral Pulses, brisk capillary refill. No clubbing, cyanosis, or edema  NERVOUS SYSTEM:  Alert & Oriented X3, speech clear. No deficits   MSK: FROM all 4 extremities, full and equal strength  SKIN: No rashes or lesions PHYSICAL EXAM:  GENERAL: NAD, lying in bed comfortably  HEAD:  Atraumatic, Normocephalic  EYES: EOMI, PERRLA, conjunctiva and sclera clear  ENT: Moist mucous membranes  NECK: Supple, No JVD  CHEST/LUNG: Crackles mild diffusely, no wheezing. respirations unlabored   HEART: irreg irreg; No murmurs, rubs, or gallops  ABDOMEN: Bowel sounds present; Soft, Nontender, Nondistended.   EXTREMITIES:  2+ Peripheral Pulses, brisk capillary refill. No clubbing, cyanosis, or edema  NERVOUS SYSTEM:  Alert & Oriented X3, speech clear. No deficits   MSK: FROM all 4 extremities, full and equal strength  SKIN: No rashes or lesions

## 2022-10-11 NOTE — H&P ADULT - ASSESSMENT
Javier Washington 67M history of lymphedema (podiatry, vascular surgery outpatient), previous admission for pneumonia in 2019 c/b pAF with spontaneous resolution to NSR not discharged on AC, p/w SOB, fever, diarrhea x 2 days. ED with GGOs, elevated procal likely pneumonia, started on ceftriaxone/azithromycin. Again with AF on monitor, asymptomatic.

## 2022-10-11 NOTE — ED ADULT NURSE NOTE - OBJECTIVE STATEMENT
pt received in room 8. pt is AxOx4 and ambulatory. pt c/o SOB and R upper back pain for two days. pt endorses cough and chills. pt sating 93% in triage, pt was placed on 2L NC and is now sating 98%. pt denies PMH. pt denies chest pain, n/v/d and difficulty urinating. pt sinus tach on cardiac monitor. pt has bilateral leg edema, pt states this is normal for him. pt RR are even and unlabored. pt has a 20g in the R wrist, labs drawn and sent. Will continue to monitor.

## 2022-10-11 NOTE — H&P ADULT - ATTENDING COMMENTS
67 year old male with history of lymphedema, pneumonia (hospitalized 2019, c/b pAF with spontaneous conversion back to NSR), now presented with fever, dyspnea, cough, and diarrhea, here febrile to 101.5 with diffuse b/l GGOs on CT imaging. Cultures sent, started on antibiotics with CAP coverage. Currently comfortable but continued cough, no further recent fevers but occasional chills. Remains in afib on tele with rates ~100, suspect triggered by acute infection. Follow up TTE, TSH, and c/w telemetry. Continue with CTX/azithro at this time. Would recommend outpatient imaging to ensure resolution of lung findings on CT, as may warrant further workup for possible ILD if persistent findings. Remainder as above.

## 2022-10-11 NOTE — H&P ADULT - PROBLEM SELECTOR PLAN 1
Fever, cough, shortness of breath, diarrhea x 2 days. Likely 2/2 pneumonia.  - CT chest 10/11 w/ diffuse GGO  - f/u BCx  - f/u SCx  - f/u legionella  - monitor SpO2  - c/w ceftriaxone x 5 days minimum (10/10 -   - c/w azithromycin x 3 days

## 2022-10-11 NOTE — H&P ADULT - PROBLEM SELECTOR PLAN 2
Hx of AF with spontaneous resolution previous admission also in setting of pneumonia. Pneumonia is again likely trigger.   - CHADVASC 1  - c/w heparin gtt  - control HR < 110 per RACE trial  - f/u TTE  - f/u TSH  - monitor HR  - recommend f/u with EP outpatient

## 2022-10-11 NOTE — H&P ADULT - NSHPLABSRESULTS_GEN_ALL_CORE
.  LABS:                         12.8   9.21  )-----------( 107      ( 11 Oct 2022 02:15 )             37.9     10-11    132<L>  |  98  |  13  ----------------------------<  117<H>  3.5   |  23  |  0.78    Ca    8.0<L>      11 Oct 2022 05:23  Phos  2.0     10-10  Mg     2.10     10-10    TPro  7.5  /  Alb  3.7  /  TBili  1.2  /  DBili  x   /  AST  77<H>  /  ALT  50<H>  /  AlkPhos  78  10-10    PT/INR - ( 11 Oct 2022 05:23 )   PT: 15.9 sec;   INR: 1.37 ratio         PTT - ( 11 Oct 2022 05:23 )  PTT:34.9 sec  Urinalysis Basic - ( 10 Oct 2022 23:55 )    Color: Light Yellow / Appearance: Clear / S.005 / pH: x  Gluc: x / Ketone: Negative  / Bili: Negative / Urobili: <2 mg/dL   Blood: x / Protein: Trace / Nitrite: Negative   Leuk Esterase: Negative / RBC: 1 /HPF / WBC 1 /HPF   Sq Epi: x / Non Sq Epi: 0 /HPF / Bacteria: Occasional      Serum Pro-Brain Natriuretic Peptide: 1224 pg/mL (10-10 @ 23:35)        RADIOLOGY, EKG & ADDITIONAL TESTS: Reviewed. LABS:                         12.8   9.21  )-----------( 107      ( 11 Oct 2022 02:15 )             37.9     10-11    132<L>  |  98  |  13  ----------------------------<  117<H>  3.5   |  23  |  0.78    Ca    8.0<L>      11 Oct 2022 05:23  Phos  2.0     10-10  Mg     2.10     10-10    TPro  7.5  /  Alb  3.7  /  TBili  1.2  /  DBili  x   /  AST  77<H>  /  ALT  50<H>  /  AlkPhos  78  10-10    PT/INR - ( 11 Oct 2022 05:23 )   PT: 15.9 sec;   INR: 1.37 ratio         PTT - ( 11 Oct 2022 05:23 )  PTT:34.9 sec  Urinalysis Basic - ( 10 Oct 2022 23:55 )    Color: Light Yellow / Appearance: Clear / S.005 / pH: x  Gluc: x / Ketone: Negative  / Bili: Negative / Urobili: <2 mg/dL   Blood: x / Protein: Trace / Nitrite: Negative   Leuk Esterase: Negative / RBC: 1 /HPF / WBC 1 /HPF   Sq Epi: x / Non Sq Epi: 0 /HPF / Bacteria: Occasional    Serum Pro-Brain Natriuretic Peptide: 1224 pg/mL (10-10 @ 23:35)    RADIOLOGY, EKG & ADDITIONAL TESTS: Reviewed.

## 2022-10-11 NOTE — H&P ADULT - PROBLEM SELECTOR PLAN 3
History of lymphedema treated by podiatry and [facility] vascular surgery. Patient notes redness of toe comes and goes, with swelling and warmth of toe 3 days prior to admission that is resolving.  - monitor  - if infection, likely covered by ceftriaxone  - if worse in morning, will call podiatry

## 2022-10-11 NOTE — ED ADULT NURSE REASSESSMENT NOTE - NS ED NURSE REASSESS COMMENT FT1
pt is resting in stretcher. pt Afib on cardiac monitor, pt sating 99% on 2L NC. pt pending CT scan. Will continue to monitor.

## 2022-10-12 LAB
ALBUMIN SERPL ELPH-MCNC: 3.2 G/DL — LOW (ref 3.3–5)
ALP SERPL-CCNC: 68 U/L — SIGNIFICANT CHANGE UP (ref 40–120)
ALT FLD-CCNC: 43 U/L — HIGH (ref 4–41)
ANION GAP SERPL CALC-SCNC: 9 MMOL/L — SIGNIFICANT CHANGE UP (ref 7–14)
APTT BLD: 35.5 SEC — SIGNIFICANT CHANGE UP (ref 27–36.3)
APTT BLD: 44.2 SEC — HIGH (ref 27–36.3)
AST SERPL-CCNC: 39 U/L — SIGNIFICANT CHANGE UP (ref 4–40)
BASOPHILS # BLD AUTO: 0.02 K/UL — SIGNIFICANT CHANGE UP (ref 0–0.2)
BASOPHILS NFR BLD AUTO: 0.3 % — SIGNIFICANT CHANGE UP (ref 0–2)
BILIRUB SERPL-MCNC: 1 MG/DL — SIGNIFICANT CHANGE UP (ref 0.2–1.2)
BUN SERPL-MCNC: 14 MG/DL — SIGNIFICANT CHANGE UP (ref 7–23)
CALCIUM SERPL-MCNC: 8.1 MG/DL — LOW (ref 8.4–10.5)
CHLORIDE SERPL-SCNC: 101 MMOL/L — SIGNIFICANT CHANGE UP (ref 98–107)
CO2 SERPL-SCNC: 27 MMOL/L — SIGNIFICANT CHANGE UP (ref 22–31)
CREAT SERPL-MCNC: 0.72 MG/DL — SIGNIFICANT CHANGE UP (ref 0.5–1.3)
CRP SERPL-MCNC: 165.3 MG/L — HIGH
CULTURE RESULTS: SIGNIFICANT CHANGE UP
EGFR: 101 ML/MIN/1.73M2 — SIGNIFICANT CHANGE UP
EOSINOPHIL # BLD AUTO: 0.03 K/UL — SIGNIFICANT CHANGE UP (ref 0–0.5)
EOSINOPHIL NFR BLD AUTO: 0.4 % — SIGNIFICANT CHANGE UP (ref 0–6)
ERYTHROCYTE [SEDIMENTATION RATE] IN BLOOD: 59 MM/HR — HIGH (ref 1–15)
GLUCOSE SERPL-MCNC: 111 MG/DL — HIGH (ref 70–99)
GRAM STN FLD: SIGNIFICANT CHANGE UP
HCT VFR BLD CALC: 35.8 % — LOW (ref 39–50)
HCT VFR BLD CALC: 36.6 % — LOW (ref 39–50)
HGB BLD-MCNC: 11.6 G/DL — LOW (ref 13–17)
HGB BLD-MCNC: 11.6 G/DL — LOW (ref 13–17)
IANC: 5.47 K/UL — SIGNIFICANT CHANGE UP (ref 1.8–7.4)
IMM GRANULOCYTES NFR BLD AUTO: 0.8 % — SIGNIFICANT CHANGE UP (ref 0–0.9)
LYMPHOCYTES # BLD AUTO: 1.36 K/UL — SIGNIFICANT CHANGE UP (ref 1–3.3)
LYMPHOCYTES # BLD AUTO: 17.1 % — SIGNIFICANT CHANGE UP (ref 13–44)
MAGNESIUM SERPL-MCNC: 2.3 MG/DL — SIGNIFICANT CHANGE UP (ref 1.6–2.6)
MCHC RBC-ENTMCNC: 29.8 PG — SIGNIFICANT CHANGE UP (ref 27–34)
MCHC RBC-ENTMCNC: 30 PG — SIGNIFICANT CHANGE UP (ref 27–34)
MCHC RBC-ENTMCNC: 31.7 GM/DL — LOW (ref 32–36)
MCHC RBC-ENTMCNC: 32.4 GM/DL — SIGNIFICANT CHANGE UP (ref 32–36)
MCV RBC AUTO: 92.5 FL — SIGNIFICANT CHANGE UP (ref 80–100)
MCV RBC AUTO: 94.1 FL — SIGNIFICANT CHANGE UP (ref 80–100)
METHOD TYPE: SIGNIFICANT CHANGE UP
MONOCYTES # BLD AUTO: 1.03 K/UL — HIGH (ref 0–0.9)
MONOCYTES NFR BLD AUTO: 12.9 % — SIGNIFICANT CHANGE UP (ref 2–14)
MSSA DNA SPEC QL NAA+PROBE: SIGNIFICANT CHANGE UP
NEUTROPHILS # BLD AUTO: 5.47 K/UL — SIGNIFICANT CHANGE UP (ref 1.8–7.4)
NEUTROPHILS NFR BLD AUTO: 68.5 % — SIGNIFICANT CHANGE UP (ref 43–77)
NRBC # BLD: 0 /100 WBCS — SIGNIFICANT CHANGE UP (ref 0–0)
NRBC # BLD: 0 /100 WBCS — SIGNIFICANT CHANGE UP (ref 0–0)
NRBC # FLD: 0 K/UL — SIGNIFICANT CHANGE UP (ref 0–0)
NRBC # FLD: 0 K/UL — SIGNIFICANT CHANGE UP (ref 0–0)
PHOSPHATE SERPL-MCNC: 2 MG/DL — LOW (ref 2.5–4.5)
PLATELET # BLD AUTO: 108 K/UL — LOW (ref 150–400)
PLATELET # BLD AUTO: 114 K/UL — LOW (ref 150–400)
POTASSIUM SERPL-MCNC: 3.7 MMOL/L — SIGNIFICANT CHANGE UP (ref 3.5–5.3)
POTASSIUM SERPL-SCNC: 3.7 MMOL/L — SIGNIFICANT CHANGE UP (ref 3.5–5.3)
PROT SERPL-MCNC: 6.7 G/DL — SIGNIFICANT CHANGE UP (ref 6–8.3)
RBC # BLD: 3.87 M/UL — LOW (ref 4.2–5.8)
RBC # BLD: 3.89 M/UL — LOW (ref 4.2–5.8)
RBC # FLD: 13.4 % — SIGNIFICANT CHANGE UP (ref 10.3–14.5)
RBC # FLD: 13.6 % — SIGNIFICANT CHANGE UP (ref 10.3–14.5)
SODIUM SERPL-SCNC: 137 MMOL/L — SIGNIFICANT CHANGE UP (ref 135–145)
SPECIMEN SOURCE: SIGNIFICANT CHANGE UP
WBC # BLD: 7.97 K/UL — SIGNIFICANT CHANGE UP (ref 3.8–10.5)
WBC # BLD: 8 K/UL — SIGNIFICANT CHANGE UP (ref 3.8–10.5)
WBC # FLD AUTO: 7.97 K/UL — SIGNIFICANT CHANGE UP (ref 3.8–10.5)
WBC # FLD AUTO: 8 K/UL — SIGNIFICANT CHANGE UP (ref 3.8–10.5)

## 2022-10-12 PROCEDURE — 76882 US LMTD JT/FCL EVL NVASC XTR: CPT | Mod: 26,RT

## 2022-10-12 PROCEDURE — 99223 1ST HOSP IP/OBS HIGH 75: CPT | Mod: GC

## 2022-10-12 PROCEDURE — 73630 X-RAY EXAM OF FOOT: CPT | Mod: 26,LT

## 2022-10-12 PROCEDURE — 99233 SBSQ HOSP IP/OBS HIGH 50: CPT | Mod: GC

## 2022-10-12 RX ORDER — CEFAZOLIN SODIUM 1 G
2000 VIAL (EA) INJECTION ONCE
Refills: 0 | Status: COMPLETED | OUTPATIENT
Start: 2022-10-12 | End: 2022-10-12

## 2022-10-12 RX ORDER — HEPARIN SODIUM 5000 [USP'U]/ML
INJECTION INTRAVENOUS; SUBCUTANEOUS
Qty: 25000 | Refills: 0 | Status: DISCONTINUED | OUTPATIENT
Start: 2022-10-12 | End: 2022-10-14

## 2022-10-12 RX ORDER — INFLUENZA VIRUS VACCINE 15; 15; 15; 15 UG/.5ML; UG/.5ML; UG/.5ML; UG/.5ML
0.7 SUSPENSION INTRAMUSCULAR ONCE
Refills: 0 | Status: DISCONTINUED | OUTPATIENT
Start: 2022-10-12 | End: 2022-10-20

## 2022-10-12 RX ORDER — HEPARIN SODIUM 5000 [USP'U]/ML
3500 INJECTION INTRAVENOUS; SUBCUTANEOUS EVERY 6 HOURS
Refills: 0 | Status: DISCONTINUED | OUTPATIENT
Start: 2022-10-12 | End: 2022-10-13

## 2022-10-12 RX ORDER — HEPARIN SODIUM 5000 [USP'U]/ML
7500 INJECTION INTRAVENOUS; SUBCUTANEOUS ONCE
Refills: 0 | Status: COMPLETED | OUTPATIENT
Start: 2022-10-12 | End: 2022-10-12

## 2022-10-12 RX ORDER — HEPARIN SODIUM 5000 [USP'U]/ML
7500 INJECTION INTRAVENOUS; SUBCUTANEOUS EVERY 6 HOURS
Refills: 0 | Status: DISCONTINUED | OUTPATIENT
Start: 2022-10-12 | End: 2022-10-13

## 2022-10-12 RX ORDER — SODIUM,POTASSIUM PHOSPHATES 278-250MG
1 POWDER IN PACKET (EA) ORAL
Refills: 0 | Status: COMPLETED | OUTPATIENT
Start: 2022-10-12 | End: 2022-10-13

## 2022-10-12 RX ORDER — CEFAZOLIN SODIUM 1 G
VIAL (EA) INJECTION
Refills: 0 | Status: DISCONTINUED | OUTPATIENT
Start: 2022-10-12 | End: 2022-10-20

## 2022-10-12 RX ORDER — CEFAZOLIN SODIUM 1 G
2000 VIAL (EA) INJECTION EVERY 8 HOURS
Refills: 0 | Status: DISCONTINUED | OUTPATIENT
Start: 2022-10-12 | End: 2022-10-20

## 2022-10-12 RX ADMIN — HEPARIN SODIUM 1900 UNIT(S)/HR: 5000 INJECTION INTRAVENOUS; SUBCUTANEOUS at 21:42

## 2022-10-12 RX ADMIN — Medication 600 MILLIGRAM(S): at 06:43

## 2022-10-12 RX ADMIN — HEPARIN SODIUM 2100 UNIT(S)/HR: 5000 INJECTION INTRAVENOUS; SUBCUTANEOUS at 09:44

## 2022-10-12 RX ADMIN — HEPARIN SODIUM 1700 UNIT(S)/HR: 5000 INJECTION INTRAVENOUS; SUBCUTANEOUS at 11:15

## 2022-10-12 RX ADMIN — HEPARIN SODIUM 7500 UNIT(S): 5000 INJECTION INTRAVENOUS; SUBCUTANEOUS at 11:15

## 2022-10-12 RX ADMIN — HEPARIN SODIUM 3500 UNIT(S): 5000 INJECTION INTRAVENOUS; SUBCUTANEOUS at 18:53

## 2022-10-12 RX ADMIN — LIDOCAINE 1 PATCH: 4 CREAM TOPICAL at 16:10

## 2022-10-12 RX ADMIN — CEFTRIAXONE 100 MILLIGRAM(S): 500 INJECTION, POWDER, FOR SOLUTION INTRAMUSCULAR; INTRAVENOUS at 01:55

## 2022-10-12 RX ADMIN — LIDOCAINE 1 PATCH: 4 CREAM TOPICAL at 06:41

## 2022-10-12 RX ADMIN — LIDOCAINE 1 PATCH: 4 CREAM TOPICAL at 07:12

## 2022-10-12 RX ADMIN — Medication 1 PACKET(S): at 17:57

## 2022-10-12 RX ADMIN — Medication 100 MILLIGRAM(S): at 11:16

## 2022-10-12 RX ADMIN — Medication 1 PACKET(S): at 15:53

## 2022-10-12 RX ADMIN — AZITHROMYCIN 255 MILLIGRAM(S): 500 TABLET, FILM COATED ORAL at 03:44

## 2022-10-12 RX ADMIN — Medication 650 MILLIGRAM(S): at 20:39

## 2022-10-12 RX ADMIN — HEPARIN SODIUM 1900 UNIT(S)/HR: 5000 INJECTION INTRAVENOUS; SUBCUTANEOUS at 18:50

## 2022-10-12 RX ADMIN — Medication 650 MILLIGRAM(S): at 21:39

## 2022-10-12 RX ADMIN — Medication 100 MILLIGRAM(S): at 17:57

## 2022-10-12 NOTE — CONSULT NOTE ADULT - SUBJECTIVE AND OBJECTIVE BOX
HPI:  68 y/o M with PMH of lymphedema, previous hospitalization in 2019 for pneumonia c/b pAF w/ spontaneous resolution presenting with fever, SOB, cough, fatigue, diarrhea for the past 2 days. Denies sick contacts at work or at home.   Otherwise well, denies chest pain, shortness of breath prior to this acute episode. Denies worsening leg swelling, although has lymphedema at baseline and his left toe has been slightly more erythematous over last several days. Has had decreased PO intake with illness and noticed his urine was getting darker.     Prior surgery to left foot at [facility]    Now with MSSA bacteremia and abnormal cehst iamging    Notes non- productive cough  Notes redness to his left foot.         PAST MEDICAL & SURGICAL HISTORY:  Wound  (chronic wound to plantar aspect of left foot) seeds podiatry and vascular surgery      No significant past surgical history  podiatry surgery in summer 2022          Allergies    No Known Allergies    Intolerances        ANTIMICROBIALS:  ceFAZolin   IVPB    ceFAZolin   IVPB 2000 every 8 hours      OTHER MEDS:  acetaminophen     Tablet .. 650 milliGRAM(s) Oral every 6 hours PRN  heparin   Injectable 7500 Unit(s) IV Push every 6 hours PRN  heparin   Injectable 3500 Unit(s) IV Push every 6 hours PRN  heparin  Infusion.  Unit(s)/Hr IV Continuous <Continuous>  ibuprofen  Tablet. 600 milliGRAM(s) Oral three times a day PRN  lidocaine   4% Patch 1 Patch Transdermal daily PRN  potassium phosphate / sodium phosphate Powder (PHOS-NaK) 1 Packet(s) Oral three times a day with meals      SOCIAL HISTORY:  Divorces  works for [employer]  Has pet Parrot    FAMILY HISTORY:  Family history of COPD (chronic obstructive pulmonary disease)  Mother    Paternal family history of emphysema (Father)  worked with asbestos    FH: Stephany-Parkinson-White syndrome (Child)  s/p EP intervention        REVIEW OF SYSTEMS  [  ] ROS unobtainable because:    [ x ] All other systems negative except as noted below:	    Constitutional:  [ ] fever [ ] chills  [ ] weight loss  [ ] weakness  Skin:  [ ] rash [ ] phlebitis	  Eyes: [ ] icterus [ ] pain  [ ] discharge	  ENMT: [ ] sore throat  [ ] thrush [ ] ulcers [ ] exudates  Respiratory: [ ] dyspnea [ ] hemoptysis [x ] cough [ ] sputum	  Cardiovascular:  [ ] chest pain [ ] palpitations [ ] edema	  Gastrointestinal:  [ ] nausea [ ] vomiting [ ] diarrhea [ ] constipation [ ] pain	  Genitourinary:  [ ] dysuria [ ] frequency [ ] hematuria [ ] discharge [ ] flank pain  [ ] incontinence  Musculoskeletal:  [ ] myalgias [ ] arthralgias [ ] arthritis  [ ] back pain  Neurological:  [ ] headache [ ] seizures  [ ] confusion/altered mental status  Psychiatric:  [ ] anxiety [ ] depression	  Hematology/Lymphatics:  [ ] lymphadenopathy  Endocrine:  [ ] adrenal [ ] thyroid  Allergic/Immunologic:	 [ ] transplant [ ] seasonal    PHYSICAL EXAM:  General: [ x] non-toxic  HEAD/EYES: [ ] PERRL [x ] white sclera [ ] icterus  ENT:  [ ] normal [x ] supple [ ] thrush [ ] pharyngeal exudate  Cardiovascular:   [ ] murmur [x ] normal [ ] PPM/AICD  Respiratory:  [ x] course BS  GI:  [x ] soft, non-tender, normal bowel sounds  :  [ ] vasquez [ x] no CVA tenderness   Musculoskeletal:  [ ] no synovitis  Neurologic:  [ ] non-focal exam   Skin:  [ x] left third toe- red/ swollen- ? lateral colection  Lymph: [ ] no lymphadenopathy  Psychiatric:  [ ] appropriate affect [ ] alert & oriented  Lines:  [ ] no phlebitis [ ] central line          Drug Dosing Weight  Height (cm): 175.3 (10 Oct 2022 21:09)  Weight (kg): 93.1 (11 Oct 2022 05:37)  BMI (kg/m2): 30.3 (11 Oct 2022 05:37)  BSA (m2): 2.09 (11 Oct 2022 05:37)    Vital Signs Last 24 Hrs  T(F): 97.5 (10-12-22 @ 10:39), Max: 101.5 (10-10-22 @ 21:09)    Vital Signs Last 24 Hrs  HR: 120 (10-12-22 @ 10:39) (93 - 120)  BP: 100/76 (10-12-22 @ 10:39) (100/76 - 135/87)  RR: 18 (10-12-22 @ 10:39)  SpO2: 97% (10-12-22 @ 10:39) (95% - 99%)  Wt(kg): --                          11.6   7.97  )-----------( 108      ( 12 Oct 2022 04:33 )             36.6       10-12    137  |  101  |  14  ----------------------------<  111<H>  3.7   |  27  |  0.72    Ca    8.1<L>      12 Oct 2022 09:28  Phos  2.0     10-  Mg     2.30     1012    TPro  6.7  /  Alb  3.2<L>  /  TBili  1.0  /  DBili  x   /  AST  39  /  ALT  43<H>  /  AlkPhos  68  10-12      Urinalysis Basic - ( 10 Oct 2022 23:55 )    Color: Light Yellow / Appearance: Clear / S.005 / pH: x  Gluc: x / Ketone: Negative  / Bili: Negative / Urobili: <2 mg/dL   Blood: x / Protein: Trace / Nitrite: Negative   Leuk Esterase: Negative / RBC: 1 /HPF / WBC 1 /HPF   Sq Epi: x / Non Sq Epi: 0 /HPF / Bacteria: Occasional        MICROBIOLOGY:  Culture - Blood (10.10.22 @ 23:50)    -  Methicillin SENSITIVE Staphylococcus aureus (MSSA): Detec Any isolate of Staphylococcus aureus from a blood culture is NOT considered a contaminant.    Gram Stain:   Growth in aerobic and anaerobic bottles: Gram Positive Cocci in Clusters    Specimen Source: .Blood Blood-Venous    Organism: Blood Culture PCR    Culture Results:   Growth in aerobic and anaerobic bottles: Gram Positive Cocci in Clusters  ***Blood Panel PCR results on this specimen are available  approximately 3 hours after the Gram stain result.***  Gram stain, PCR, and/or culture results may not always  correspond due to difference in methodologies.  ************************************************************  This PCR assay was performed by multiplex PCR. This  Assay tests for 66 bacterial and resistance gene targets.  Please refer to the [facility] test directory  at [email or web address] for details.    Organism Identification: Blood Culture PCR    Method Type: PCR        RADIOLOGY:    < from: CT Chest No Cont (10.11.22 @ 03:06) >  IMPRESSION:  Diffuse bilateral groundglass opacities with interlobular septal   thickening and superimposed nonspecific scattered peripheral nodular   consolidations demonstrated. Differential considerations include   multifocal pneumonia. Interstitial lung disease is also a diagnostic   possibility..    < end of copied text >  < from: Xray Chest 2 Views PA/Lat (10.11.22 @ 00:19) >  IMPRESSION:    Diffuse bilateral reticular opacities may represent multifocal pneumonia   versus more likely chronic interstitial lung disease.    < end of copied text >

## 2022-10-12 NOTE — CONSULT NOTE ADULT - SUBJECTIVE AND OBJECTIVE BOX
Patient is a 67y old  Male who presents with a chief complaint of shortness of breath, fever (12 Oct 2022 14:14)      HPI:  68 y/o M with PMH of lymphedema, previous hospitalization in 2019 for pneumonia c/b pAF w/ spontaneous resolutino presenting with fever, SOB, cough, fatigue, diarrhea for the past 2 days. Denies sick contacts at work or at home. Works at [employer] so has high risk for exposure. No co-workers sick. Otherwise well, denies chest pain, shortness of breath prior to this acute episode. Denies worsening leg swelling, although has lymphedema at baseline and his left toe has been slightly more erythematous over last several days. Has had decreased PO intake with illness and noticed his urine was getting darker.     Past smoker: stopped in 1985, no chronic cough or shortness of breath.  Sees a podiatrist and vascular surgeon for his lymphedema. No other medical providers.   Takes no medications at home.     ED Course:  Vitals: T 101.5 F, , /82, RR 15, SpO2 92% on RA  received tylenol, azithro 500 mg, CTX 1g, 1L LR bolus, started on heparin gtt for afib  CT Chest noncon: Diffuse bilateral groundglass opacities with interlobular septal thickening and superimposed nonspecific scattered peripheral nodular consolidations demonstrated. Differential considerations include multifocal pneumonia. Interstitial lung disease is also a diagnostic possibility.   (11 Oct 2022 07:01)      PAST MEDICAL & SURGICAL HISTORY:  Wound  (chronic wound to plantar aspect of left foot) seeds podiatry and vascular surgery      No significant past surgical history  podiatry surgery in summer 2022          MEDICATIONS  (STANDING):  ceFAZolin   IVPB      ceFAZolin   IVPB 2000 milliGRAM(s) IV Intermittent every 8 hours  heparin  Infusion.  Unit(s)/Hr (17 mL/Hr) IV Continuous <Continuous>  levoFLOXacin  Tablet 500 milliGRAM(s) Oral every 24 hours  potassium phosphate / sodium phosphate Powder (PHOS-NaK) 1 Packet(s) Oral three times a day with meals    MEDICATIONS  (PRN):  acetaminophen     Tablet .. 650 milliGRAM(s) Oral every 6 hours PRN Temp greater or equal to 38C (100.4F), Mild Pain (1 - 3), Moderate Pain (4 - 6), Severe Pain (7 - 10)  heparin   Injectable 7500 Unit(s) IV Push every 6 hours PRN For aPTT less than 40  heparin   Injectable 3500 Unit(s) IV Push every 6 hours PRN For aPTT between 40 - 57  ibuprofen  Tablet. 600 milliGRAM(s) Oral three times a day PRN Mild Pain (1 - 3), Moderate Pain (4 - 6), Severe Pain (7 - 10)  lidocaine   4% Patch 1 Patch Transdermal daily PRN pain MSK      Allergies    No Known Allergies    Intolerances        VITALS:    Vital Signs Last 24 Hrs  T(C): 36.7 (12 Oct 2022 17:04), Max: 37 (11 Oct 2022 18:13)  T(F): 98 (12 Oct 2022 17:04), Max: 98.6 (11 Oct 2022 18:13)  HR: 84 (12 Oct 2022 17:04) (84 - 120)  BP: 122/72 (12 Oct 2022 17:04) (100/76 - 135/87)  BP(mean): --  RR: 19 (12 Oct 2022 17:04) (18 - 22)  SpO2: 99% (12 Oct 2022 17:04) (95% - 99%)    Parameters below as of 12 Oct 2022 17:04  Patient On (Oxygen Delivery Method): room air        LABS:                          11.6   7.97  )-----------( 108      ( 12 Oct 2022 04:33 )             36.6       10-12    137  |  101  |  14  ----------------------------<  111<H>  3.7   |  27  |  0.72    Ca    8.1<L>      12 Oct 2022 09:28  Phos  2.0     10-12  Mg     2.30     10-12    TPro  6.7  /  Alb  3.2<L>  /  TBili  1.0  /  DBili  x   /  AST  39  /  ALT  43<H>  /  AlkPhos  68  10-12      CAPILLARY BLOOD GLUCOSE          PT/INR - ( 11 Oct 2022 05:23 )   PT: 15.9 sec;   INR: 1.37 ratio         PTT - ( 12 Oct 2022 09:46 )  PTT:35.5 sec    LOWER EXTREMITY PHYSICAL EXAM:    Vascular: DP/PT 2/4, B/L, CFT <3 seconds B/L, Temperature gradient warm to warm L, warm to cool R/.   Neuro: Epicritic sensation diminished to the level of midfoot, B/L.  Musculoskeletal/Ortho: pitting edema to ankle and lower leg B/L  Skin: R foot no acute signs of infection L foot bullae to submet 3 area. Erythema to digit 3 to the level of 3rd MPJ, digit is mild dactylitis.     RADIOLOGY & ADDITIONAL STUDIES:

## 2022-10-12 NOTE — CONSULT NOTE ADULT - ASSESSMENT
67y Male with L foot submet 3 wound to capsule  - Patient seen and evaluated  - Afebrile ,WBC 7.97,ESR 59,CRP 16.53  - Skin: R foot no acute signs of infection L foot bullae to submet 3 area. Erythema to digit 3 to the level of 3rd MPJ, digit is mild dactylitis.   - Xray L foot, resident read: chronic fracture noted to met head 2, met 2 shortened and dorsally displaced. no acute signs of OM, no gas.   - De-siva of the bullae carried out with sterile suture removal kit, 3cc of pus expressed, revealed epitheliazed wound bed with tracking sinus to 3rd MPJ capsule, no malodor.   - Verbal consent obtained then bedside incision and drainage was performed with sterile 15 blade and fine suture kit to submet 3 wound area, depth to capsule and not beyond.  no additional purulence drainage expressed in total, and no malodor noted. Irrigated wound with copious amount of sterile saline. Applied packing, followed by DSD. Patient tolerated well with no other incidents.  - L foot MR ordered  - L foot wound cultured, Recommended ID follow up.   - Pod plan local wound care vs surgical intervention pending MRI  - Discussed with attending.

## 2022-10-12 NOTE — PATIENT PROFILE ADULT - VISION (WITH CORRECTIVE LENSES IF THE PATIENT USUALLY WEARS THEM):
pt able to make needs known in english/Normal vision: sees adequately in most situations; can see medication labels, newsprint

## 2022-10-12 NOTE — PROGRESS NOTE ADULT - PROBLEM SELECTOR PLAN 3
History of lymphedema treated by podiatry and [facility] vascular surgery. Patient notes redness of toe comes and goes, with swelling and warmth of toe 3 days prior to admission that is resolving.  - monitor  - if infection, likely covered by ceftriaxone  - if worse in morning, will call podiatry

## 2022-10-12 NOTE — PATIENT PROFILE ADULT - FALL HARM RISK - HARM RISK INTERVENTIONS
Assistance with ambulation/Assistance OOB with selected safe patient handling equipment/Communicate Risk of Fall with Harm to all staff/Discuss with provider need for PT consult/Monitor gait and stability/Reinforce activity limits and safety measures with patient and family/Tailored Fall Risk Interventions/Visual Cue: Yellow wristband and red socks/Bed in lowest position, wheels locked, appropriate side rails in place/Call bell, personal items and telephone in reach/Instruct patient to call for assistance before getting out of bed or chair/Non-slip footwear when patient is out of bed/[place] to call system/Physically safe environment - no spills, clutter or unnecessary equipment/Purposeful Proactive Rounding/Room/bathroom lighting operational, light cord in reach

## 2022-10-12 NOTE — PHARMACOTHERAPY INTERVENTION NOTE - COMMENTS
54 yo M with MSSA bacteremia (mecA gene not detected), initiated on CAP coverage with ceftriaxone/azithromycin.    Recommendation(s):  1) As per hospital policy, patients with Staphylococcus aureus invasive disease (including bacteremia), require a mandatory ID consultation.   2) Ceftriaxone is not the ideal treatment for MSSA bacteremia.    Vazquez Valdez, PharmD, [facility]  Clinical Pharmacy Specialist, Infectious Diseases  Spectra extension [phone]  .

## 2022-10-12 NOTE — CONSULT NOTE ADULT - ASSESSMENT
67 year old with history of left foot infection presents with fever, cough and shortness of breath.  Found to have new pulmonary opacities and MSSA bacteremia.       1) MSSA bacteremia  Change to cefazolin 2 gram iv q 8  Repeat blood cultures every 48 hours until negative  Check echocardiogram    2) Left third toe erythema  Toe is swollen and red  Notes prior foot surgery  Check X ray of the left foot  Podiatry evaluation    3) Abnormal chest imaging  Cough with changes on chest imaging  ? nodules- need to assesses for septic pulmonary emboli  Check Echo  Check sputum culture  Add levaquin 500 mg po daily  Check Urine legionella  Check urine histoplasmosis  Check psittacosis serology  Will need follow up imaging with pmd

## 2022-10-12 NOTE — PROGRESS NOTE ADULT - SUBJECTIVE AND OBJECTIVE BOX
Kev Montgomery PGY1  pager [phone] or Teams or check resident tab for coverage    Patient is a 67y old  Male who presents with a chief complaint of shortness of breath, fever (11 Oct 2022 07:01)    SUBJECTIVE / OVERNIGHT EVENTS:    NAEO.  Patient this morning is,    Brief Daily Plan:    MEDICATIONS  MEDICATIONS  (STANDING):  ceFAZolin   IVPB      ceFAZolin   IVPB 2000 milliGRAM(s) IV Intermittent every 8 hours  heparin  Infusion.  Unit(s)/Hr (17 mL/Hr) IV Continuous <Continuous>  potassium phosphate / sodium phosphate Powder (PHOS-NaK) 1 Packet(s) Oral three times a day with meals    MEDICATIONS  (PRN):  acetaminophen     Tablet .. 650 milliGRAM(s) Oral every 6 hours PRN Temp greater or equal to 38C (100.4F), Mild Pain (1 - 3), Moderate Pain (4 - 6), Severe Pain (7 - 10)  heparin   Injectable 7500 Unit(s) IV Push every 6 hours PRN For aPTT less than 40  heparin   Injectable 3500 Unit(s) IV Push every 6 hours PRN For aPTT between 40 - 57  ibuprofen  Tablet. 600 milliGRAM(s) Oral three times a day PRN Mild Pain (1 - 3), Moderate Pain (4 - 6), Severe Pain (7 - 10)  lidocaine   4% Patch 1 Patch Transdermal daily PRN pain MSK      VITALS  Vital Signs Last 24 Hrs  T(C): 36.4 (12 Oct 2022 10:39), Max: 37 (11 Oct 2022 18:13)  T(F): 97.5 (12 Oct 2022 10:39), Max: 98.6 (11 Oct 2022 18:13)  HR: 120 (12 Oct 2022 10:39) (93 - 120)  BP: 100/76 (12 Oct 2022 10:39) (100/76 - 135/87)  BP(mean): --  RR: 18 (12 Oct 2022 10:39) (18 - 22)  SpO2: 97% (12 Oct 2022 10:39) (95% - 99%)    Parameters below as of 12 Oct 2022 10:39  Patient On (Oxygen Delivery Method): room air        PHYSICAL EXAM:  GENERAL: no distress  PSYCH: A&O x3  HEAD: Atraumatic, Normocephalic  NECK: Supple, No JVD  CHEST/LUNG: clear to auscultation bilaterally  HEART: regular rate and rhythm, no murmurs  ABDOMEN: nontender to palpation, no rebound tenderness/guarding  EXTREMITIES: no edema on bilateral LE  NEUROLOGY: no focal neurologic deficit  SKIN: No rashes or lesions    LABS:  All labs personally Reviewed.    RADIOLOGY & ADDITIONAL TESTS:  All imaging personally Reviewed.

## 2022-10-13 LAB
-  AMPICILLIN/SULBACTAM: SIGNIFICANT CHANGE UP
-  CEFAZOLIN: SIGNIFICANT CHANGE UP
-  CLINDAMYCIN: SIGNIFICANT CHANGE UP
-  ERYTHROMYCIN: SIGNIFICANT CHANGE UP
-  GENTAMICIN: SIGNIFICANT CHANGE UP
-  OXACILLIN: SIGNIFICANT CHANGE UP
-  PENICILLIN: SIGNIFICANT CHANGE UP
-  RIFAMPIN: SIGNIFICANT CHANGE UP
-  TETRACYCLINE: SIGNIFICANT CHANGE UP
-  TRIMETHOPRIM/SULFAMETHOXAZOLE: SIGNIFICANT CHANGE UP
-  VANCOMYCIN: SIGNIFICANT CHANGE UP
ALBUMIN SERPL ELPH-MCNC: 3.1 G/DL — LOW (ref 3.3–5)
ALP SERPL-CCNC: 73 U/L — SIGNIFICANT CHANGE UP (ref 40–120)
ALT FLD-CCNC: 37 U/L — SIGNIFICANT CHANGE UP (ref 4–41)
ANION GAP SERPL CALC-SCNC: 11 MMOL/L — SIGNIFICANT CHANGE UP (ref 7–14)
APTT BLD: 44.3 SEC — HIGH (ref 27–36.3)
APTT BLD: 48 SEC — HIGH (ref 27–36.3)
APTT BLD: 60.2 SEC — HIGH (ref 27–36.3)
APTT BLD: 80.4 SEC — HIGH (ref 27–36.3)
AST SERPL-CCNC: 34 U/L — SIGNIFICANT CHANGE UP (ref 4–40)
BASOPHILS # BLD AUTO: 0.03 K/UL — SIGNIFICANT CHANGE UP (ref 0–0.2)
BASOPHILS NFR BLD AUTO: 0.4 % — SIGNIFICANT CHANGE UP (ref 0–2)
BILIRUB SERPL-MCNC: 0.6 MG/DL — SIGNIFICANT CHANGE UP (ref 0.2–1.2)
BUN SERPL-MCNC: 14 MG/DL — SIGNIFICANT CHANGE UP (ref 7–23)
CALCIUM SERPL-MCNC: 8.2 MG/DL — LOW (ref 8.4–10.5)
CHLORIDE SERPL-SCNC: 98 MMOL/L — SIGNIFICANT CHANGE UP (ref 98–107)
CO2 SERPL-SCNC: 24 MMOL/L — SIGNIFICANT CHANGE UP (ref 22–31)
CREAT SERPL-MCNC: 0.75 MG/DL — SIGNIFICANT CHANGE UP (ref 0.5–1.3)
CULTURE RESULTS: SIGNIFICANT CHANGE UP
CULTURE RESULTS: SIGNIFICANT CHANGE UP
EGFR: 100 ML/MIN/1.73M2 — SIGNIFICANT CHANGE UP
EOSINOPHIL # BLD AUTO: 0.09 K/UL — SIGNIFICANT CHANGE UP (ref 0–0.5)
EOSINOPHIL NFR BLD AUTO: 1.1 % — SIGNIFICANT CHANGE UP (ref 0–6)
GLUCOSE SERPL-MCNC: 108 MG/DL — HIGH (ref 70–99)
GRAM STN FLD: SIGNIFICANT CHANGE UP
HCT VFR BLD CALC: 34.1 % — LOW (ref 39–50)
HGB BLD-MCNC: 11.3 G/DL — LOW (ref 13–17)
IANC: 5.82 K/UL — SIGNIFICANT CHANGE UP (ref 1.8–7.4)
IMM GRANULOCYTES NFR BLD AUTO: 1 % — HIGH (ref 0–0.9)
LYMPHOCYTES # BLD AUTO: 1.23 K/UL — SIGNIFICANT CHANGE UP (ref 1–3.3)
LYMPHOCYTES # BLD AUTO: 15.5 % — SIGNIFICANT CHANGE UP (ref 13–44)
MAGNESIUM SERPL-MCNC: 2.2 MG/DL — SIGNIFICANT CHANGE UP (ref 1.6–2.6)
MCHC RBC-ENTMCNC: 29.9 PG — SIGNIFICANT CHANGE UP (ref 27–34)
MCHC RBC-ENTMCNC: 33.1 GM/DL — SIGNIFICANT CHANGE UP (ref 32–36)
MCV RBC AUTO: 90.2 FL — SIGNIFICANT CHANGE UP (ref 80–100)
METHOD TYPE: SIGNIFICANT CHANGE UP
MONOCYTES # BLD AUTO: 0.7 K/UL — SIGNIFICANT CHANGE UP (ref 0–0.9)
MONOCYTES NFR BLD AUTO: 8.8 % — SIGNIFICANT CHANGE UP (ref 2–14)
MRSA PCR RESULT.: SIGNIFICANT CHANGE UP
NEUTROPHILS # BLD AUTO: 5.82 K/UL — SIGNIFICANT CHANGE UP (ref 1.8–7.4)
NEUTROPHILS NFR BLD AUTO: 73.2 % — SIGNIFICANT CHANGE UP (ref 43–77)
NRBC # BLD: 0 /100 WBCS — SIGNIFICANT CHANGE UP (ref 0–0)
NRBC # FLD: 0 K/UL — SIGNIFICANT CHANGE UP (ref 0–0)
ORGANISM # SPEC MICROSCOPIC CNT: SIGNIFICANT CHANGE UP
PHOSPHATE SERPL-MCNC: 2.6 MG/DL — SIGNIFICANT CHANGE UP (ref 2.5–4.5)
PLATELET # BLD AUTO: 103 K/UL — LOW (ref 150–400)
POTASSIUM SERPL-MCNC: 3.9 MMOL/L — SIGNIFICANT CHANGE UP (ref 3.5–5.3)
POTASSIUM SERPL-SCNC: 3.9 MMOL/L — SIGNIFICANT CHANGE UP (ref 3.5–5.3)
PROT SERPL-MCNC: 6.7 G/DL — SIGNIFICANT CHANGE UP (ref 6–8.3)
RBC # BLD: 3.78 M/UL — LOW (ref 4.2–5.8)
RBC # FLD: 13.4 % — SIGNIFICANT CHANGE UP (ref 10.3–14.5)
S AUREUS DNA NOSE QL NAA+PROBE: DETECTED
SODIUM SERPL-SCNC: 133 MMOL/L — LOW (ref 135–145)
SPECIMEN SOURCE: SIGNIFICANT CHANGE UP
SPECIMEN SOURCE: SIGNIFICANT CHANGE UP
WBC # BLD: 7.95 K/UL — SIGNIFICANT CHANGE UP (ref 3.8–10.5)
WBC # FLD AUTO: 7.95 K/UL — SIGNIFICANT CHANGE UP (ref 3.8–10.5)

## 2022-10-13 PROCEDURE — 99232 SBSQ HOSP IP/OBS MODERATE 35: CPT

## 2022-10-13 PROCEDURE — 99233 SBSQ HOSP IP/OBS HIGH 50: CPT | Mod: GC

## 2022-10-13 RX ORDER — LIDOCAINE 4 G/100G
1 CREAM TOPICAL DAILY
Refills: 0 | Status: DISCONTINUED | OUTPATIENT
Start: 2022-10-13 | End: 2022-10-13

## 2022-10-13 RX ORDER — CYCLOBENZAPRINE HYDROCHLORIDE 10 MG/1
10 TABLET, FILM COATED ORAL THREE TIMES A DAY
Refills: 0 | Status: DISCONTINUED | OUTPATIENT
Start: 2022-10-13 | End: 2022-10-20

## 2022-10-13 RX ORDER — LIDOCAINE 4 G/100G
1 CREAM TOPICAL DAILY
Refills: 0 | Status: DISCONTINUED | OUTPATIENT
Start: 2022-10-13 | End: 2022-10-20

## 2022-10-13 RX ORDER — HEPARIN SODIUM 5000 [USP'U]/ML
3500 INJECTION INTRAVENOUS; SUBCUTANEOUS EVERY 6 HOURS
Refills: 0 | Status: DISCONTINUED | OUTPATIENT
Start: 2022-10-13 | End: 2022-10-14

## 2022-10-13 RX ORDER — HEPARIN SODIUM 5000 [USP'U]/ML
7500 INJECTION INTRAVENOUS; SUBCUTANEOUS EVERY 6 HOURS
Refills: 0 | Status: DISCONTINUED | OUTPATIENT
Start: 2022-10-13 | End: 2022-10-14

## 2022-10-13 RX ADMIN — HEPARIN SODIUM 2100 UNIT(S)/HR: 5000 INJECTION INTRAVENOUS; SUBCUTANEOUS at 10:47

## 2022-10-13 RX ADMIN — Medication 100 MILLIGRAM(S): at 08:32

## 2022-10-13 RX ADMIN — HEPARIN SODIUM 2300 UNIT(S)/HR: 5000 INJECTION INTRAVENOUS; SUBCUTANEOUS at 17:04

## 2022-10-13 RX ADMIN — HEPARIN SODIUM 1900 UNIT(S)/HR: 5000 INJECTION INTRAVENOUS; SUBCUTANEOUS at 01:18

## 2022-10-13 RX ADMIN — Medication 600 MILLIGRAM(S): at 09:21

## 2022-10-13 RX ADMIN — HEPARIN SODIUM 2300 UNIT(S)/HR: 5000 INJECTION INTRAVENOUS; SUBCUTANEOUS at 19:52

## 2022-10-13 RX ADMIN — Medication 100 MILLIGRAM(S): at 17:13

## 2022-10-13 RX ADMIN — Medication 600 MILLIGRAM(S): at 10:21

## 2022-10-13 RX ADMIN — LIDOCAINE 1 PATCH: 4 CREAM TOPICAL at 01:30

## 2022-10-13 RX ADMIN — HEPARIN SODIUM 1900 UNIT(S)/HR: 5000 INJECTION INTRAVENOUS; SUBCUTANEOUS at 07:24

## 2022-10-13 RX ADMIN — HEPARIN SODIUM 2300 UNIT(S)/HR: 5000 INJECTION INTRAVENOUS; SUBCUTANEOUS at 23:30

## 2022-10-13 RX ADMIN — Medication 100 MILLIGRAM(S): at 01:18

## 2022-10-13 RX ADMIN — HEPARIN SODIUM 2100 UNIT(S)/HR: 5000 INJECTION INTRAVENOUS; SUBCUTANEOUS at 09:22

## 2022-10-13 RX ADMIN — LIDOCAINE 1 PATCH: 4 CREAM TOPICAL at 22:51

## 2022-10-13 RX ADMIN — Medication 1 PACKET(S): at 08:30

## 2022-10-13 RX ADMIN — HEPARIN SODIUM 3500 UNIT(S): 5000 INJECTION INTRAVENOUS; SUBCUTANEOUS at 17:06

## 2022-10-13 RX ADMIN — LIDOCAINE 1 PATCH: 4 CREAM TOPICAL at 07:41

## 2022-10-13 RX ADMIN — LIDOCAINE 1 PATCH: 4 CREAM TOPICAL at 13:11

## 2022-10-13 NOTE — PROGRESS NOTE ADULT - SUBJECTIVE AND OBJECTIVE BOX
Patient is a 67y old  Male who presents with a chief complaint of shortness of breath, fever (13 Oct 2022 07:19)       INTERVAL HPI/OVERNIGHT EVENTS:  Patient seen and evaluated at bedside.  Pt is resting comfortable in NAD. Denies N/V/F/C.    Allergies    No Known Allergies    Intolerances        Vital Signs Last 24 Hrs  T(C): 36.6 (13 Oct 2022 12:00), Max: 36.7 (12 Oct 2022 17:04)  T(F): 97.8 (13 Oct 2022 12:00), Max: 98 (12 Oct 2022 17:04)  HR: 64 (13 Oct 2022 12:00) (63 - 86)  BP: 134/74 (13 Oct 2022 12:00) (117/55 - 136/72)  BP(mean): --  RR: 18 (13 Oct 2022 12:00) (18 - 20)  SpO2: 99% (13 Oct 2022 12:00) (96% - 99%)    Parameters below as of 13 Oct 2022 12:00  Patient On (Oxygen Delivery Method): room air        LABS:                        11.3   7.95  )-----------( 103      ( 13 Oct 2022 06:00 )             34.1     10-13    133<L>  |  98  |  14  ----------------------------<  108<H>  3.9   |  24  |  0.75    Ca    8.2<L>      13 Oct 2022 06:00  Phos  2.6     10-13  Mg     2.20     10-13    TPro  6.7  /  Alb  3.1<L>  /  TBili  0.6  /  DBili  x   /  AST  34  /  ALT  37  /  AlkPhos  73  10-13    PTT - ( 13 Oct 2022 06:00 )  PTT:44.3 sec    CAPILLARY BLOOD GLUCOSE          Lower Extremity Physical Exam:    Vascular: DP/PT 2/4, B/L, CFT <3 seconds B/L, Temperature gradient warm to warm L, warm to cool R/.   Neuro: Epicritic sensation diminished to the level of midfoot, B/L.  Musculoskeletal/Ortho: pitting edema to ankle and lower leg B/L  Skin: R foot no acute signs of infection L foot s/p bullae deroof 10/12: wound at submet 3 area to dermis with tracking sinus to 3rd MPJ capsule, serosanguineous drainage today, no pus.       RADIOLOGY & ADDITIONAL TESTS:

## 2022-10-13 NOTE — PROGRESS NOTE ADULT - PROBLEM SELECTOR PLAN 3
History of lymphedema treated by podiatry and [facility] vascular surgery. Patient notes redness of toe comes and goes, with swelling and warmth of toe 3 days prior to admission that is resolving.  - monitor  - if infection, likely covered by ceftriaxone  - if worse in morning, will call podiatry History of lymphedema treated by podiatry and [facility] vascular surgery. Patient notes redness of toe comes and goes, with swelling and warmth of toe 3 days prior to admission that is resolving.  - monitor  - appreciate podiatry recs, possible source of infection given MSSA bacteremia  - c/w cefazolin  - f/u wound cx History of lymphedema treated by podiatry and [facility] vascular surgery. Patient notes redness of toe comes and goes, with swelling and warmth of toe 3 days prior to admission that is resolving.  - monitor  - appreciate podiatry recs, possible source of infection given MSSA bacteremia  - c/w cefazolin  - f/u wound cx  - pending MRI L foot to r/o OM as xray was remarkable for soft tissue swelling

## 2022-10-13 NOTE — PROGRESS NOTE ADULT - SUBJECTIVE AND OBJECTIVE BOX
Follow Up:      Inverval History/ROS:Patient is a 67y old  Male who presents with a chief complaint of shortness of breath, fever (13 Oct 2022 15:10)    Feels a little better   No fever  S/p I and D of toe collection      Allergies    No Known Allergies    Intolerances        ANTIMICROBIALS:  ceFAZolin   IVPB    ceFAZolin   IVPB 2000 every 8 hours  levoFLOXacin  Tablet 500 every 24 hours      OTHER MEDS:  acetaminophen     Tablet .. 650 milliGRAM(s) Oral every 6 hours PRN  cyclobenzaprine 10 milliGRAM(s) Oral three times a day PRN  heparin   Injectable 3500 Unit(s) IV Push every 6 hours PRN  heparin   Injectable 7500 Unit(s) IV Push every 6 hours PRN  heparin  Infusion.  Unit(s)/Hr IV Continuous <Continuous>  ibuprofen  Tablet. 600 milliGRAM(s) Oral three times a day PRN  influenza  Vaccine (HIGH DOSE) 0.7 milliLiter(s) IntraMuscular once  lidocaine   4% Patch 1 Patch Transdermal daily PRN  lidocaine   4% Patch 1 Patch Transdermal daily      Vital Signs Last 24 Hrs  T(C): 36.6 (13 Oct 2022 12:00), Max: 36.6 (13 Oct 2022 12:00)  T(F): 97.8 (13 Oct 2022 12:00), Max: 97.8 (13 Oct 2022 12:00)  HR: 64 (13 Oct 2022 12:00) (63 - 65)  BP: 134/74 (13 Oct 2022 12:00) (134/74 - 136/72)  BP(mean): --  RR: 18 (13 Oct 2022 12:00) (18 - 18)  SpO2: 99% (13 Oct 2022 12:00) (99% - 99%)    Parameters below as of 13 Oct 2022 12:00  Patient On (Oxygen Delivery Method): room air        PHYSICAL EXAM:  General: [x ] non-toxic  HEAD/EYES: [ ] PERRL [x ] white sclera [ ] icterus  ENT:  [ ] normal [x ] supple [ ] thrush [ ] pharyngeal exudate  Cardiovascular:   [ ] murmur [ x] normal [ ] PPM/AICD  Respiratory:  [x ] clear to ausculation bilaterally  GI:  [ x] soft, non-tender, normal bowel sounds  :  [ ] vasquez [x ] no CVA tenderness   Musculoskeletal:  [ ] no synovitis  Neurologic:  [ ] non-focal exam   Skin:  [ x] no rash  Lymph: [ ] no lymphadenopathy  Psychiatric:  [ ] appropriate affect [x ] alert & oriented  Lines:  [x ] no phlebitis [ ] central line                                11.3   7.95  )-----------( 103      ( 13 Oct 2022 06:00 )             34.1       10-13    133<L>  |  98  |  14  ----------------------------<  108<H>  3.9   |  24  |  0.75    Ca    8.2<L>      13 Oct 2022 06:00  Phos  2.6     10-13  Mg     2.20     10-13    TPro  6.7  /  Alb  3.1<L>  /  TBili  0.6  /  DBili  x   /  AST  34  /  ALT  37  /  AlkPhos  73  10-13          MICROBIOLOGY:Culture Results:   No growth to date. (10-12-22 @ 11:00)  Culture Results:   <10,000 CFU/mL Normal Urogenital Chikis (10-10-22 @ 23:55)  Culture Results:   Growth in aerobic and anaerobic bottles: Staphylococcus aureus  ***Blood Panel PCR results on this specimen are available  approximately 3 hours after the Gram stain result.***  Gram stain, PCR, and/or culture results may not always  correspond dueto difference in methodologies.  ************************************************************  This PCR assay was performed by multiplex PCR. This  Assay tests for 66 bacterial and resistance gene targets.  Please refer to the [facility] test directory  at [email or web address] for details. (10-10-22 @ 23:50)  Culture Results:   Growth in aerobic and anaerobic bottles: Staphylococcus aureus  See previous culture [id number] (10-10-22 @ 23:35)      RADIOLOGY:

## 2022-10-13 NOTE — PROGRESS NOTE ADULT - PROBLEM SELECTOR PLAN 1
Fever, cough, shortness of breath, diarrhea x 2 days. Likely 2/2 pneumonia.  - CT chest 10/11 w/ diffuse GGO  - f/u BCx  - f/u SCx  - f/u legionella  - monitor SpO2  - c/w ceftriaxone x 5 days minimum (10/10 -   - c/w azithromycin x 3 days Fever, cough, shortness of breath, diarrhea x 2 days. Likely 2/2 pneumonia.  - CT chest 10/11 w/ diffuse GGO  - BCx growing MSSA in all bottles, repeat NGTD so far  - f/u SCx  - f/u legionella cx, histoplasma urine antigen, chlamydia Ab/serology,   - monitor SpO2  - s/p CTX and azithro, now on cefazolin 2g IV q8 (10/12 - ) and levaquin 500 mg PO daily (10/12 - )

## 2022-10-13 NOTE — PROGRESS NOTE ADULT - PROBLEM SELECTOR PLAN 2
Hx of AF with spontaneous resolution previous admission also in setting of pneumonia. Pneumonia is again likely trigger.   - CHADVASC 1  - c/w heparin gtt  - control HR < 110 per RACE trial  - f/u TTE  - f/u TSH  - monitor HR  - recommend f/u with EP outpatient Hx of AF with spontaneous resolution previous admission also in setting of pneumonia. Pneumonia is again likely trigger.   - CHADVASC 1  - c/w heparin gtt  - control HR < 110 per RACE trial  - TTE wnl   - f/u TSH  - monitor HR  - recommend f/u with EP outpatient

## 2022-10-13 NOTE — PROGRESS NOTE ADULT - ASSESSMENT
67 year old with history of left foot infection presents with fever, cough and shortness of breath.  Found to have new pulmonary opacities and MSSA bacteremia.       1) MSSA bacteremia  Change to cefazolin 2 gram iv q 8  Repeat blood cultures every 48 hours until negative  One set of cultures without growth   TTE limited- but no obvious vegetation    2) Left third toe erythema  Podiatry evaluation- s/p I and D  Follow up wound culture    3) Abnormal chest imaging  Cough with changes on chest imaging    Check sputum culture  levaquin 500 mg po daily for 5 d  Check Urine legionella  Check urine histoplasmosis  Check psittacosis serology  Will need follow up imaging with pmd

## 2022-10-13 NOTE — PROGRESS NOTE ADULT - SUBJECTIVE AND OBJECTIVE BOX
***************************************************************  Jonn Fairchild, PGY2  Internal Medicine   NS pager: [phone]  [facility] pager: [phone]  ***************************************************************        CHICO WICK  67y  Male      Patient is a 67y old  Male who presents with a chief complaint of shortness of breath, fever (12 Oct 2022 17:24)      INTERVAL HPI/OVERNIGHT EVENTS:       FAMILY HISTORY:  Family history of COPD (chronic obstructive pulmonary disease)  Mother    Paternal family history of emphysema (Father)  worked with asbestos    FH: Stephany-Parkinson-White syndrome (Child)  s/p EP intervention      T(C): 36.4 (10-13-22 @ 05:00), Max: 36.7 (10-12-22 @ 17:04)  HR: 65 (10-13-22 @ 05:00) (63 - 120)  BP: 135/74 (10-13-22 @ 05:00) (100/76 - 136/72)  RR: 18 (10-13-22 @ 05:00) (18 - 20)  SpO2: 99% (10-13-22 @ 05:00) (96% - 99%)  Wt(kg): --Vital Signs Last 24 Hrs  T(C): 36.4 (13 Oct 2022 05:00), Max: 36.7 (12 Oct 2022 17:04)  T(F): 97.6 (13 Oct 2022 05:00), Max: 98 (12 Oct 2022 17:04)  HR: 65 (13 Oct 2022 05:00) (63 - 120)  BP: 135/74 (13 Oct 2022 05:00) (100/76 - 136/72)  BP(mean): --  RR: 18 (13 Oct 2022 05:00) (18 - 20)  SpO2: 99% (13 Oct 2022 05:00) (96% - 99%)    Parameters below as of 13 Oct 2022 05:00  Patient On (Oxygen Delivery Method): room air      No Known Allergies      PHYSICAL EXAM:  GENERAL: NAD, laying comfortably in bed  HEAD:  Atraumatic, Normocephalic  EYES: EOMI, PERRLA, conjunctiva and sclera clear  ENMT: No tonsillar erythema, exudates, or enlargement; Moist mucous membranes  NECK: Supple, No JVD  NERVOUS SYSTEM:  Alert & Oriented X3, Good concentration; Motor Strength grossly intact in B/L upper and lower extremities;   CHEST/LUNG: Clear to auscultation bilaterally; No rales, rhonchi, wheezing, or rubs  HEART: Regular rate and rhythm; No murmurs, rubs, or gallops  ABDOMEN: Soft, Nontender, Nondistended; Bowel sounds present  EXTREMITIES:  No clubbing, cyanosis, or edema  LYMPH: No lymphadenopathy noted  SKIN: No rashes or lesions        LABS:                            11.6   8.00  )-----------( 114      ( 12 Oct 2022 17:20 )             35.8       10-12    137  |  101  |  14  ----------------------------<  111<H>  3.7   |  27  |  0.72    Ca    8.1<L>      12 Oct 2022 09:28  Phos  2.0     10-12  Mg     2.30     10-12    TPro  6.7  /  Alb  3.2<L>  /  TBili  1.0  /  DBili  x   /  AST  39  /  ALT  43<H>  /  AlkPhos  68  10-12                  PTT - ( 13 Oct 2022 00:53 )  PTT:60.2 sec          CAPILLARY BLOOD GLUCOSE                    RADIOLOGY & ADDITIONAL TESTS:      acetaminophen     Tablet .. 650 milliGRAM(s) Oral every 6 hours PRN  ceFAZolin   IVPB      ceFAZolin   IVPB 2000 milliGRAM(s) IV Intermittent every 8 hours  heparin   Injectable 7500 Unit(s) IV Push every 6 hours PRN  heparin   Injectable 3500 Unit(s) IV Push every 6 hours PRN  heparin  Infusion.  Unit(s)/Hr IV Continuous <Continuous>  ibuprofen  Tablet. 600 milliGRAM(s) Oral three times a day PRN  influenza  Vaccine (HIGH DOSE) 0.7 milliLiter(s) IntraMuscular once  levoFLOXacin  Tablet 500 milliGRAM(s) Oral every 24 hours  lidocaine   4% Patch 1 Patch Transdermal daily PRN  potassium phosphate / sodium phosphate Powder (PHOS-NaK) 1 Packet(s) Oral three times a day with meals      HEALTH ISSUES - PROBLEM Dx:  Sepsis    Atrial fibrillation    Lymphedema    Need for prophylactic measure           ***************************************************************  Jonn Fairchild, PGY2  Internal Medicine   NS pager: [phone]  [facility] pager: [phone]  ***************************************************************        CHICO WICK  67y  Male      Patient is a 67y old  Male who presents with a chief complaint of shortness of breath, fever (12 Oct 2022 17:24)      INTERVAL HPI/OVERNIGHT EVENTS: No acute events overnight. At bedside, pt complained on R shoulder pain (alleviated with palpation, possibly musculoskeletal) and R knee pain. Denied fever, chills, CP, SOB, nausea, vomiting, diarrhea, constipation, dysuria.       FAMILY HISTORY:  Family history of COPD (chronic obstructive pulmonary disease)  Mother    Paternal family history of emphysema (Father)  worked with asbestos    FH: Stephany-Parkinson-White syndrome (Child)  s/p EP intervention      T(C): 36.4 (10-13-22 @ 05:00), Max: 36.7 (10-12-22 @ 17:04)  HR: 65 (10-13-22 @ 05:00) (63 - 120)  BP: 135/74 (10-13-22 @ 05:00) (100/76 - 136/72)  RR: 18 (10-13-22 @ 05:00) (18 - 20)  SpO2: 99% (10-13-22 @ 05:00) (96% - 99%)  Wt(kg): --Vital Signs Last 24 Hrs  T(C): 36.4 (13 Oct 2022 05:00), Max: 36.7 (12 Oct 2022 17:04)  T(F): 97.6 (13 Oct 2022 05:00), Max: 98 (12 Oct 2022 17:04)  HR: 65 (13 Oct 2022 05:00) (63 - 120)  BP: 135/74 (13 Oct 2022 05:00) (100/76 - 136/72)  BP(mean): --  RR: 18 (13 Oct 2022 05:00) (18 - 20)  SpO2: 99% (13 Oct 2022 05:00) (96% - 99%)    Parameters below as of 13 Oct 2022 05:00  Patient On (Oxygen Delivery Method): room air      No Known Allergies      PHYSICAL EXAM:  GENERAL: NAD, laying comfortably in bed  HEAD:  Atraumatic, Normocephalic  EYES: EOMI, PERRLA, conjunctiva and sclera clear  ENMT: No tonsillar erythema, exudates, or enlargement; Moist mucous membranes  NECK: Supple, No JVD  NERVOUS SYSTEM:  Alert & Oriented X3, Good concentration; Motor Strength grossly intact in B/L upper and lower extremities;   CHEST/LUNG: b/l crackles in anterior and posterior lung fields, coarse breath sounds  HEART: irregular rate and rhythm; No murmurs, rubs, or gallops  ABDOMEN: Soft, Nontender, Nondistended; Bowel sounds present  EXTREMITIES:  + mild chronic edema (lymphedema); No clubbing, cyanosis  LYMPH: No lymphadenopathy noted  SKIN: No rashes or lesions        LABS:                            11.6   8.00  )-----------( 114      ( 12 Oct 2022 17:20 )             35.8       10-12    137  |  101  |  14  ----------------------------<  111<H>  3.7   |  27  |  0.72    Ca    8.1<L>      12 Oct 2022 09:28  Phos  2.0     10-12  Mg     2.30     10-12    TPro  6.7  /  Alb  3.2<L>  /  TBili  1.0  /  DBili  x   /  AST  39  /  ALT  43<H>  /  AlkPhos  68  10-12                  PTT - ( 13 Oct 2022 00:53 )  PTT:60.2 sec          CAPILLARY BLOOD GLUCOSE                    RADIOLOGY & ADDITIONAL TESTS:      acetaminophen     Tablet .. 650 milliGRAM(s) Oral every 6 hours PRN  ceFAZolin   IVPB      ceFAZolin   IVPB 2000 milliGRAM(s) IV Intermittent every 8 hours  heparin   Injectable 7500 Unit(s) IV Push every 6 hours PRN  heparin   Injectable 3500 Unit(s) IV Push every 6 hours PRN  heparin  Infusion.  Unit(s)/Hr IV Continuous <Continuous>  ibuprofen  Tablet. 600 milliGRAM(s) Oral three times a day PRN  influenza  Vaccine (HIGH DOSE) 0.7 milliLiter(s) IntraMuscular once  levoFLOXacin  Tablet 500 milliGRAM(s) Oral every 24 hours  lidocaine   4% Patch 1 Patch Transdermal daily PRN  potassium phosphate / sodium phosphate Powder (PHOS-NaK) 1 Packet(s) Oral three times a day with meals      HEALTH ISSUES - PROBLEM Dx:  Sepsis    Atrial fibrillation    Lymphedema    Need for prophylactic measure

## 2022-10-13 NOTE — PROGRESS NOTE ADULT - ASSESSMENT
67y Male with L foot submet 3 wound to capsule  - Patient seen and evaluated  - Afebrile ,WBC 7.97,ESR 59,CRP 16.53  - Skin: R foot no acute signs of infection L foot s/p bullae deroof 10/12: wound at submet 3 area to dermis with tracking sinus to 3rd MPJ capsule, serosanguineous drainage today, no pus.   - L foot MR pending  - L foot wound culture: pending  - Pod plan local wound care vs surgical intervention pending MRI  - Discussed with attending.

## 2022-10-14 DIAGNOSIS — R78.81 BACTEREMIA: ICD-10-CM

## 2022-10-14 LAB
ANION GAP SERPL CALC-SCNC: 9 MMOL/L — SIGNIFICANT CHANGE UP (ref 7–14)
APTT BLD: 90.1 SEC — HIGH (ref 27–36.3)
BASOPHILS # BLD AUTO: 0.06 K/UL — SIGNIFICANT CHANGE UP (ref 0–0.2)
BASOPHILS NFR BLD AUTO: 0.7 % — SIGNIFICANT CHANGE UP (ref 0–2)
BUN SERPL-MCNC: 14 MG/DL — SIGNIFICANT CHANGE UP (ref 7–23)
C PNEUM IGM TITR SER: SIGNIFICANT CHANGE UP TITER
C PSITTACI IGG SER-ACNC: SIGNIFICANT CHANGE UP TITER
C PSITTACI IGM TITR SER: SIGNIFICANT CHANGE UP TITER
C TRACH IGG TITR SER: SIGNIFICANT CHANGE UP TITER
C TRACH IGM SER-ACNC: SIGNIFICANT CHANGE UP TITER
CALCIUM SERPL-MCNC: 8.7 MG/DL — SIGNIFICANT CHANGE UP (ref 8.4–10.5)
CHLAMYDIA IGG SER-ACNC: ABNORMAL TITER
CHLORIDE SERPL-SCNC: 102 MMOL/L — SIGNIFICANT CHANGE UP (ref 98–107)
CO2 SERPL-SCNC: 26 MMOL/L — SIGNIFICANT CHANGE UP (ref 22–31)
CREAT SERPL-MCNC: 0.7 MG/DL — SIGNIFICANT CHANGE UP (ref 0.5–1.3)
CULTURE RESULTS: SIGNIFICANT CHANGE UP
EGFR: 102 ML/MIN/1.73M2 — SIGNIFICANT CHANGE UP
EOSINOPHIL # BLD AUTO: 0.23 K/UL — SIGNIFICANT CHANGE UP (ref 0–0.5)
EOSINOPHIL NFR BLD AUTO: 2.8 % — SIGNIFICANT CHANGE UP (ref 0–6)
GLUCOSE SERPL-MCNC: 110 MG/DL — HIGH (ref 70–99)
GRAM STN FLD: SIGNIFICANT CHANGE UP
GRAM STN FLD: SIGNIFICANT CHANGE UP
HCT VFR BLD CALC: 35.1 % — LOW (ref 39–50)
HGB BLD-MCNC: 11.5 G/DL — LOW (ref 13–17)
IANC: 5.11 K/UL — SIGNIFICANT CHANGE UP (ref 1.8–7.4)
IMM GRANULOCYTES NFR BLD AUTO: 3.8 % — HIGH (ref 0–0.9)
LYMPHOCYTES # BLD AUTO: 1.65 K/UL — SIGNIFICANT CHANGE UP (ref 1–3.3)
LYMPHOCYTES # BLD AUTO: 20.4 % — SIGNIFICANT CHANGE UP (ref 13–44)
MAGNESIUM SERPL-MCNC: 2.2 MG/DL — SIGNIFICANT CHANGE UP (ref 1.6–2.6)
MCHC RBC-ENTMCNC: 29.8 PG — SIGNIFICANT CHANGE UP (ref 27–34)
MCHC RBC-ENTMCNC: 32.8 GM/DL — SIGNIFICANT CHANGE UP (ref 32–36)
MCV RBC AUTO: 90.9 FL — SIGNIFICANT CHANGE UP (ref 80–100)
MONOCYTES # BLD AUTO: 0.72 K/UL — SIGNIFICANT CHANGE UP (ref 0–0.9)
MONOCYTES NFR BLD AUTO: 8.9 % — SIGNIFICANT CHANGE UP (ref 2–14)
NEUTROPHILS # BLD AUTO: 5.11 K/UL — SIGNIFICANT CHANGE UP (ref 1.8–7.4)
NEUTROPHILS NFR BLD AUTO: 63.4 % — SIGNIFICANT CHANGE UP (ref 43–77)
NRBC # BLD: 0 /100 WBCS — SIGNIFICANT CHANGE UP (ref 0–0)
NRBC # FLD: 0 K/UL — SIGNIFICANT CHANGE UP (ref 0–0)
PHOSPHATE SERPL-MCNC: 3.5 MG/DL — SIGNIFICANT CHANGE UP (ref 2.5–4.5)
PLATELET # BLD AUTO: 154 K/UL — SIGNIFICANT CHANGE UP (ref 150–400)
POTASSIUM SERPL-MCNC: 4.2 MMOL/L — SIGNIFICANT CHANGE UP (ref 3.5–5.3)
POTASSIUM SERPL-SCNC: 4.2 MMOL/L — SIGNIFICANT CHANGE UP (ref 3.5–5.3)
RBC # BLD: 3.86 M/UL — LOW (ref 4.2–5.8)
RBC # FLD: 13.5 % — SIGNIFICANT CHANGE UP (ref 10.3–14.5)
SODIUM SERPL-SCNC: 137 MMOL/L — SIGNIFICANT CHANGE UP (ref 135–145)
SPECIMEN SOURCE: SIGNIFICANT CHANGE UP
TSH SERPL-MCNC: 2.07 UIU/ML — SIGNIFICANT CHANGE UP (ref 0.27–4.2)
WBC # BLD: 8.08 K/UL — SIGNIFICANT CHANGE UP (ref 3.8–10.5)
WBC # FLD AUTO: 8.08 K/UL — SIGNIFICANT CHANGE UP (ref 3.8–10.5)

## 2022-10-14 PROCEDURE — 99233 SBSQ HOSP IP/OBS HIGH 50: CPT | Mod: GC

## 2022-10-14 PROCEDURE — 99232 SBSQ HOSP IP/OBS MODERATE 35: CPT

## 2022-10-14 RX ORDER — ENOXAPARIN SODIUM 100 MG/ML
90 INJECTION SUBCUTANEOUS EVERY 12 HOURS
Refills: 0 | Status: DISCONTINUED | OUTPATIENT
Start: 2022-10-14 | End: 2022-10-14

## 2022-10-14 RX ORDER — MUPIROCIN 20 MG/G
1 OINTMENT TOPICAL
Refills: 0 | Status: COMPLETED | OUTPATIENT
Start: 2022-10-14 | End: 2022-10-19

## 2022-10-14 RX ORDER — ENOXAPARIN SODIUM 100 MG/ML
90 INJECTION SUBCUTANEOUS EVERY 12 HOURS
Refills: 0 | Status: DISCONTINUED | OUTPATIENT
Start: 2022-10-14 | End: 2022-10-20

## 2022-10-14 RX ORDER — ENOXAPARIN SODIUM 100 MG/ML
94 INJECTION SUBCUTANEOUS EVERY 12 HOURS
Refills: 0 | Status: DISCONTINUED | OUTPATIENT
Start: 2022-10-14 | End: 2022-10-14

## 2022-10-14 RX ADMIN — LIDOCAINE 1 PATCH: 4 CREAM TOPICAL at 19:07

## 2022-10-14 RX ADMIN — Medication 100 MILLIGRAM(S): at 01:13

## 2022-10-14 RX ADMIN — ENOXAPARIN SODIUM 90 MILLIGRAM(S): 100 INJECTION SUBCUTANEOUS at 21:34

## 2022-10-14 RX ADMIN — MUPIROCIN 1 APPLICATION(S): 20 OINTMENT TOPICAL at 18:34

## 2022-10-14 RX ADMIN — HEPARIN SODIUM 2300 UNIT(S)/HR: 5000 INJECTION INTRAVENOUS; SUBCUTANEOUS at 08:42

## 2022-10-14 RX ADMIN — HEPARIN SODIUM 2300 UNIT(S)/HR: 5000 INJECTION INTRAVENOUS; SUBCUTANEOUS at 07:44

## 2022-10-14 RX ADMIN — HEPARIN SODIUM 2300 UNIT(S)/HR: 5000 INJECTION INTRAVENOUS; SUBCUTANEOUS at 00:37

## 2022-10-14 RX ADMIN — LIDOCAINE 1 PATCH: 4 CREAM TOPICAL at 07:53

## 2022-10-14 RX ADMIN — Medication 600 MILLIGRAM(S): at 01:32

## 2022-10-14 RX ADMIN — Medication 600 MILLIGRAM(S): at 02:30

## 2022-10-14 RX ADMIN — LIDOCAINE 1 PATCH: 4 CREAM TOPICAL at 12:10

## 2022-10-14 RX ADMIN — LIDOCAINE 1 PATCH: 4 CREAM TOPICAL at 12:26

## 2022-10-14 RX ADMIN — Medication 100 MILLIGRAM(S): at 09:13

## 2022-10-14 RX ADMIN — Medication 600 MILLIGRAM(S): at 18:37

## 2022-10-14 RX ADMIN — Medication 100 MILLIGRAM(S): at 18:13

## 2022-10-14 RX ADMIN — HEPARIN SODIUM 2300 UNIT(S)/HR: 5000 INJECTION INTRAVENOUS; SUBCUTANEOUS at 09:16

## 2022-10-14 NOTE — PROGRESS NOTE ADULT - ASSESSMENT
67 year old with history of left foot infection presents with fever, cough and shortness of breath.  Found to have new pulmonary opacities and MSSA bacteremia.     1) MSSA bacteremia  cefazolin 2 gram iv q 8  Repeat blood cultures are positive- continue  to repeat blood cultures every 48 hours until negative    TTE limited- with positive repeat blood cultures- I would check a OMI    2) Left third toe erythema  Podiatry evaluation- s/p I and D  Follow up wound culture- gorwing staph aureus    3) Abnormal chest imaging  Cough with changes on chest imaging    Check sputum culture  levaquin 500 mg po daily for 5 d    Check urine histoplasmosis  Check psittacosis serology- testing  Will need follow up imaging with pmd     Call the ID service with questions or concerns over the weekend.  [phone]

## 2022-10-14 NOTE — PROGRESS NOTE ADULT - PROBLEM SELECTOR PROBLEM 2
EXAM DESCRIPTION:  L SPINE 2 VIEWS; NO CHG FLUORO



IMAGES COMPLETED DATE/TIME:  7/21/2020 12:36 pm



REASON FOR STUDY:  SPINAL STIMULATOR REMOVAL ASSISTED WITH FLUOROSCOPY IN OR G89.4  CHRONIC PAIN SYND
MAIRA M54.16  RADICULOPATHY, LUMBAR REGION



COMPARISON:  None.



FLUOROSCOPY TIME:  1.1 minute.

12 images saved to PACS.



TECHNIQUE:  Intra-operative images acquired during surgical procedure to evaluate progress.

NUMBER OF IMAGES: 12 images.



LIMITATIONS:  None.



FINDINGS:  Images of the lumbosacral region acquired during the procedure.



IMPRESSION:  IMAGE(S) OBTAINED DURING PROCEDURE.



COMMENT:  Quality :  Final reports for procedures using fluoroscopy that document radiation exp
osure indices, or exposure time and number of fluorographic images (if radiation exposure indices are
 not available)

Please consult full operative report of the attending physician for description of the procedure.



TECHNICAL DOCUMENTATION:  JOB ID:  [id number]

 2011 [employer]- All Rights Reserved



Reading location - IP/workstation name: [email or web address] Atrial fibrillation MSSA bacteremia

## 2022-10-14 NOTE — PROGRESS NOTE ADULT - SUBJECTIVE AND OBJECTIVE BOX
Discharge Summary     Admission diagnoses:   fever    Discharge diagnoses:   MSSA bacteremia    Hospital Course:   For full details, please see H&P, progress notes, consult notes and ancillary notes. Briefly, Javier Washington is a 67M history of lymphedema (s/p procedures per outpatient podiatry and vascular surgery) and admission for pneumonia in 2019 c/b spontaneously resolved atrial fibrillation presenting with 2 days of fever, cough, shortness of breath and 3 days of right toe erythema and swelling. CT with diffuse ground glass opacities initially concerning for multifocal pneumonia, started on ceftriaxone and azithromycin. Blood culture with MSSA bacteremia. Abx switched to ancef and levaquin per ID. Right foot bullae deroofed per podiatry with S. aureus in wound culture. TTE was negative. OMI due to persistent bacteremia was ***. Patient is discharged on ***.    On day of discharge, patient is clinically stable with no new exam findings or acute symptoms compared to prior. The patient was seen by the attending physician on the date of discharge and deemed stable and acceptable for discharge. The patient's chronic medical conditions were treated accordingly per the patient's home medication regimen. The patient's medication reconciliation (with changes made to chronic medications), follow up appointments, discharge orders, instructions, and significant lab and diagnostic studies are as noted.     Discharge follow up action items:     1. Follow up with PCP in 1-2 weeks.   2. Medication changes ***    Patient's ordered code status: ***    Patient disposition: ***

## 2022-10-14 NOTE — PROGRESS NOTE ADULT - PROBLEM SELECTOR PLAN 4
DVT PPx: heparin gtt  Diet: regular  Dispo: home Hx of AF with spontaneous resolution previous admission also in setting of pneumonia. Pneumonia is again likely trigger.   - CHADVASC 1  - c/w heparin gtt, consider transition to lovenox  - control HR < 110 per RACE trial  - TTE wnl   - TSH wnl  - monitor HR  - recommend f/u with EP outpatient Decreased/Increased/anxiety related to hospital/ treatment/coping/ adjustment

## 2022-10-14 NOTE — PROGRESS NOTE ADULT - SUBJECTIVE AND OBJECTIVE BOX
Follow Up:      Inverval History/ROS:Patient is a 67y old  Male who presents with a chief complaint of shortness of breath, fever (14 Oct 2022 15:27)    No fever  No events    Allergies    No Known Allergies    Intolerances        ANTIMICROBIALS:  ceFAZolin   IVPB    ceFAZolin   IVPB 2000 every 8 hours  levoFLOXacin  Tablet 500 every 24 hours      OTHER MEDS:  acetaminophen     Tablet .. 650 milliGRAM(s) Oral every 6 hours PRN  cyclobenzaprine 10 milliGRAM(s) Oral three times a day PRN  enoxaparin Injectable 90 milliGRAM(s) SubCutaneous every 12 hours  heparin   Injectable 7500 Unit(s) IV Push every 6 hours PRN  heparin   Injectable 3500 Unit(s) IV Push every 6 hours PRN  heparin  Infusion.  Unit(s)/Hr IV Continuous <Continuous>  ibuprofen  Tablet. 600 milliGRAM(s) Oral three times a day PRN  influenza  Vaccine (HIGH DOSE) 0.7 milliLiter(s) IntraMuscular once  lidocaine   4% Patch 1 Patch Transdermal daily  lidocaine   4% Patch 1 Patch Transdermal daily PRN  mupirocin 2% Ointment 1 Application(s) Both Nostrils two times a day      Vital Signs Last 24 Hrs  T(C): 36.9 (14 Oct 2022 12:34), Max: 36.9 (14 Oct 2022 12:34)  T(F): 98.4 (14 Oct 2022 12:34), Max: 98.4 (14 Oct 2022 12:34)  HR: 77 (14 Oct 2022 12:34) (76 - 77)  BP: 141/76 (14 Oct 2022 12:34) (140/77 - 154/90)  BP(mean): --  RR: 17 (14 Oct 2022 12:34) (17 - 18)  SpO2: 99% (14 Oct 2022 12:34) (98% - 99%)    Parameters below as of 14 Oct 2022 12:34  Patient On (Oxygen Delivery Method): room air        PHYSICAL EXAM:  General: [ ] non-toxic  HEAD/EYES: [ ] PERRL [x ] white sclera [ ] icterus  ENT:  [ ] normal [ ] supple [ ] thrush [ ] pharyngeal exudate  Cardiovascular:   [ ] murmur [ x] normal [ ] PPM/AICD  Respiratory:  [x ] clear to ausculation bilaterally  GI:  [x ] soft, non-tender, normal bowel sounds  :  [ ] vasquez [ ] no CVA tenderness   Musculoskeletal:  [ ] no synovitis  Neurologic:  [ ] non-focal exam   Skin:  [x ] no rash  Lymph: [x ] no lymphadenopathy  Psychiatric:  [ ] appropriate affect [ ] alert & oriented  Lines:  [ x] no phlebitis [ ] central line                                11.5   8.08  )-----------( 154      ( 14 Oct 2022 06:23 )             35.1       10-14    137  |  102  |  14  ----------------------------<  110<H>  4.2   |  26  |  0.70    Ca    8.7      14 Oct 2022 06:23  Phos  3.5     10-14  Mg     2.20     10-14    TPro  6.7  /  Alb  3.1<L>  /  TBili  0.6  /  DBili  x   /  AST  34  /  ALT  37  /  AlkPhos  73  10-13          MICROBIOLOGY:Culture Results:   Numerous Staphylococcus aureus (10-12-22 @ 17:10)  Culture Results:   Growth in aerobic bottle: Gram Positive Cocci in Clusters (10-12-22 @ 12:15)  Culture Results:   Growth in anaerobic bottle: Gram Positive Cocci in Clusters  Growth in aerobic bottle: Gram Positive Cocci in Clusters (10-12-22 @ 11:00)  Culture Results:   <10,000 CFU/mL Normal Urogenital Chikis (10-10-22 @ 23:55)  Culture Results:   Growth in aerobic and anaerobic bottles: Staphylococcus aureus  ***Blood Panel PCR results on this specimen are available  approximately 3 hours after the Gram stain result.***  Gram stain, PCR, and/or culture results may not always  correspond dueto difference in methodologies.  ************************************************************  This PCR assay was performed by multiplex PCR. This  Assay tests for 66 bacterial and resistance gene targets.  Please refer to the [facility] test directory  at [email or web address] for details. (10-10-22 @ 23:50)  Culture Results:   Growth in aerobic and anaerobic bottles: Staphylococcus aureus  See previous culture [id number] (10-10-22 @ 23:35)      RADIOLOGY:

## 2022-10-14 NOTE — PROGRESS NOTE ADULT - PROBLEM SELECTOR PLAN 2
Hx of AF with spontaneous resolution previous admission also in setting of pneumonia. Pneumonia is again likely trigger.   - CHADVASC 1  - c/w heparin gtt  - control HR < 110 per RACE trial  - TTE wnl   - f/u TSH  - monitor HR  - recommend f/u with EP outpatient MSSA bacteremia. Positive 10/10.  - management as above.

## 2022-10-14 NOTE — PROGRESS NOTE ADULT - PROBLEM SELECTOR PLAN 3
History of lymphedema treated by podiatry and [facility] vascular surgery. Patient notes redness of toe comes and goes, with swelling and warmth of toe 3 days prior to admission that is resolving.  - monitor  - appreciate podiatry recs, possible source of infection given MSSA bacteremia  - c/w cefazolin  - f/u wound cx  - pending MRI L foot to r/o OM as xray was remarkable for soft tissue swelling History of lymphedema treated by podiatry and [facility] vascular surgery. Patient notes redness of toe comes and goes, with swelling and warmth of toe 3 days prior to admission that is resolving.  - appreciate podiatry recs, possible source of infection given MSSA bacteremia  - c/w cefazolin per ID  - Wound Cx: many S. aureus  - pending MRI L foot to r/o OM as xray was remarkable for soft tissue swelling

## 2022-10-14 NOTE — PROGRESS NOTE ADULT - SUBJECTIVE AND OBJECTIVE BOX
Kev Montgomery PGY1  pager [phone] or Teams or check resident tab for coverage    Patient is a 67y old  Male who presents with a chief complaint of shortness of breath, fever (13 Oct 2022 18:03)    SUBJECTIVE / OVERNIGHT EVENTS:    NAEO.  Patient this morning is,    Brief Daily Plan:    MEDICATIONS  MEDICATIONS  (STANDING):  ceFAZolin   IVPB      ceFAZolin   IVPB 2000 milliGRAM(s) IV Intermittent every 8 hours  heparin  Infusion.  Unit(s)/Hr (17 mL/Hr) IV Continuous <Continuous>  influenza  Vaccine (HIGH DOSE) 0.7 milliLiter(s) IntraMuscular once  levoFLOXacin  Tablet 500 milliGRAM(s) Oral every 24 hours  lidocaine   4% Patch 1 Patch Transdermal daily    MEDICATIONS  (PRN):  acetaminophen     Tablet .. 650 milliGRAM(s) Oral every 6 hours PRN Temp greater or equal to 38C (100.4F), Mild Pain (1 - 3), Moderate Pain (4 - 6), Severe Pain (7 - 10)  cyclobenzaprine 10 milliGRAM(s) Oral three times a day PRN Muscle Spasm  heparin   Injectable 7500 Unit(s) IV Push every 6 hours PRN For aPTT less than 40  heparin   Injectable 3500 Unit(s) IV Push every 6 hours PRN For aPTT between 40 - 57  ibuprofen  Tablet. 600 milliGRAM(s) Oral three times a day PRN Mild Pain (1 - 3), Moderate Pain (4 - 6), Severe Pain (7 - 10)  lidocaine   4% Patch 1 Patch Transdermal daily PRN pain MSK      VITALS  Vital Signs Last 24 Hrs  T(C): 36.7 (13 Oct 2022 21:57), Max: 36.7 (13 Oct 2022 21:57)  T(F): 98.1 (13 Oct 2022 21:57), Max: 98.1 (13 Oct 2022 21:57)  HR: 77 (13 Oct 2022 21:57) (64 - 77)  BP: 140/77 (13 Oct 2022 21:57) (134/74 - 140/77)  BP(mean): --  RR: 18 (13 Oct 2022 12:00) (18 - 18)  SpO2: 99% (13 Oct 2022 12:00) (99% - 99%)    Parameters below as of 13 Oct 2022 21:57  Patient On (Oxygen Delivery Method): room air        PHYSICAL EXAM:  GENERAL: no distress  PSYCH: A&O x3  HEAD: Atraumatic, Normocephalic  NECK: Supple, No JVD  CHEST/LUNG: clear to auscultation bilaterally  HEART: regular rate and rhythm, no murmurs  ABDOMEN: nontender to palpation, no rebound tenderness/guarding  EXTREMITIES: no edema on bilateral LE  NEUROLOGY: no focal neurologic deficit  SKIN: No rashes or lesions    LABS:  All labs personally Reviewed.    RADIOLOGY & ADDITIONAL TESTS:  All imaging personally Reviewed.  Kev Montgomery PGY1  pager [phone] or Teams or check resident tab for coverage    Patient is a 67y old  Male who presents with a chief complaint of shortness of breath, fever (13 Oct 2022 18:03)    SUBJECTIVE / OVERNIGHT EVENTS:    NAEO.  Patient this morning is doing well. Denies any fever, chills, shortness of breath.     Brief Daily Plan:    MEDICATIONS  MEDICATIONS  (STANDING):  ceFAZolin   IVPB      ceFAZolin   IVPB 2000 milliGRAM(s) IV Intermittent every 8 hours  heparin  Infusion.  Unit(s)/Hr (17 mL/Hr) IV Continuous <Continuous>  influenza  Vaccine (HIGH DOSE) 0.7 milliLiter(s) IntraMuscular once  levoFLOXacin  Tablet 500 milliGRAM(s) Oral every 24 hours  lidocaine   4% Patch 1 Patch Transdermal daily    MEDICATIONS  (PRN):  acetaminophen     Tablet .. 650 milliGRAM(s) Oral every 6 hours PRN Temp greater or equal to 38C (100.4F), Mild Pain (1 - 3), Moderate Pain (4 - 6), Severe Pain (7 - 10)  cyclobenzaprine 10 milliGRAM(s) Oral three times a day PRN Muscle Spasm  heparin   Injectable 7500 Unit(s) IV Push every 6 hours PRN For aPTT less than 40  heparin   Injectable 3500 Unit(s) IV Push every 6 hours PRN For aPTT between 40 - 57  ibuprofen  Tablet. 600 milliGRAM(s) Oral three times a day PRN Mild Pain (1 - 3), Moderate Pain (4 - 6), Severe Pain (7 - 10)  lidocaine   4% Patch 1 Patch Transdermal daily PRN pain MSK      VITALS  Vital Signs Last 24 Hrs  T(C): 36.7 (13 Oct 2022 21:57), Max: 36.7 (13 Oct 2022 21:57)  T(F): 98.1 (13 Oct 2022 21:57), Max: 98.1 (13 Oct 2022 21:57)  HR: 77 (13 Oct 2022 21:57) (64 - 77)  BP: 140/77 (13 Oct 2022 21:57) (134/74 - 140/77)  BP(mean): --  RR: 18 (13 Oct 2022 12:00) (18 - 18)  SpO2: 99% (13 Oct 2022 12:00) (99% - 99%)    Parameters below as of 13 Oct 2022 21:57  Patient On (Oxygen Delivery Method): room air        PHYSICAL EXAM:  GENERAL: no distress  PSYCH: A&O x3  HEAD: Atraumatic, Normocephalic  NECK: Supple, No JVD  CHEST/LUNG: clear to auscultation bilaterally  HEART: regular rate and rhythm, no murmurs  ABDOMEN: nontender to palpation, no rebound tenderness/guarding  EXTREMITIES: no edema on bilateral LE  NEUROLOGY: no focal neurologic deficit  SKIN: No rashes or lesions    LABS:  All labs personally Reviewed.    RADIOLOGY & ADDITIONAL TESTS:  All imaging personally Reviewed.  Kev Montgomery PGY1  pager [phone] or Teams or check resident tab for coverage    Patient is a 67y old  Male who presents with a chief complaint of shortness of breath, fever (13 Oct 2022 18:03)    SUBJECTIVE / OVERNIGHT EVENTS:    NAEO.  Patient this morning is doing well. Denies any fever, chills, shortness of breath. Pain in right knee is improving, full ROM. Left toe is nontender, re-dressed this morning by podiatry.     Brief Daily Plan:  ·	c/w antibiotics  ·	c/w wound care per podiatry  ·	f/u foot MRI  ·	podiatry, infectious disease consulted, appreciate recs    MEDICATIONS  MEDICATIONS  (STANDING):  ceFAZolin   IVPB      ceFAZolin   IVPB 2000 milliGRAM(s) IV Intermittent every 8 hours  heparin  Infusion.  Unit(s)/Hr (17 mL/Hr) IV Continuous <Continuous>  influenza  Vaccine (HIGH DOSE) 0.7 milliLiter(s) IntraMuscular once  levoFLOXacin  Tablet 500 milliGRAM(s) Oral every 24 hours  lidocaine   4% Patch 1 Patch Transdermal daily    MEDICATIONS  (PRN):  acetaminophen     Tablet .. 650 milliGRAM(s) Oral every 6 hours PRN Temp greater or equal to 38C (100.4F), Mild Pain (1 - 3), Moderate Pain (4 - 6), Severe Pain (7 - 10)  cyclobenzaprine 10 milliGRAM(s) Oral three times a day PRN Muscle Spasm  heparin   Injectable 7500 Unit(s) IV Push every 6 hours PRN For aPTT less than 40  heparin   Injectable 3500 Unit(s) IV Push every 6 hours PRN For aPTT between 40 - 57  ibuprofen  Tablet. 600 milliGRAM(s) Oral three times a day PRN Mild Pain (1 - 3), Moderate Pain (4 - 6), Severe Pain (7 - 10)  lidocaine   4% Patch 1 Patch Transdermal daily PRN pain MSK    VITALS  Vital Signs Last 24 Hrs  T(C): 36.7 (13 Oct 2022 21:57), Max: 36.7 (13 Oct 2022 21:57)  T(F): 98.1 (13 Oct 2022 21:57), Max: 98.1 (13 Oct 2022 21:57)  HR: 77 (13 Oct 2022 21:57) (64 - 77)  BP: 140/77 (13 Oct 2022 21:57) (134/74 - 140/77)  BP(mean): --  RR: 18 (13 Oct 2022 12:00) (18 - 18)  SpO2: 99% (13 Oct 2022 12:00) (99% - 99%)    Parameters below as of 13 Oct 2022 21:57  Patient On (Oxygen Delivery Method): room air    PHYSICAL EXAM:  GENERAL: no distress  PSYCH: A&O x3  HEAD: Atraumatic, Normocephalic  NECK: Supple, No JVD  CHEST/LUNG: mild crackles, scattered, at bases  HEART: regular rate and rhythm, no murmurs  ABDOMEN: nontender to palpation, no rebound tenderness/guarding  EXTREMITIES:   chronic lymphedema noted, left foot under dressing per podiatry  right knee non-tender, no fluid, full ROM  NEUROLOGY: no focal neurologic deficit  SKIN: No rashes or lesions    LABS:  All labs personally Reviewed.    RADIOLOGY & ADDITIONAL TESTS:  All imaging personally Reviewed.

## 2022-10-14 NOTE — PROGRESS NOTE ADULT - SUBJECTIVE AND OBJECTIVE BOX
Patient is a 67y old  Male who presents with a chief complaint of shortness of breath, fever (14 Oct 2022 07:10)       INTERVAL HPI/OVERNIGHT EVENTS:  Patient seen and evaluated at bedside.  Pt is resting comfortable in NAD. Denies N/V/F/C.    Allergies    No Known Allergies    Intolerances        Vital Signs Last 24 Hrs  T(C): 36.9 (14 Oct 2022 12:34), Max: 36.9 (14 Oct 2022 12:34)  T(F): 98.4 (14 Oct 2022 12:34), Max: 98.4 (14 Oct 2022 12:34)  HR: 77 (14 Oct 2022 12:34) (76 - 77)  BP: 141/76 (14 Oct 2022 12:34) (140/77 - 154/90)  BP(mean): --  RR: 17 (14 Oct 2022 12:34) (17 - 18)  SpO2: 99% (14 Oct 2022 12:34) (98% - 99%)    Parameters below as of 14 Oct 2022 12:34  Patient On (Oxygen Delivery Method): room air        LABS:                        11.5   8.08  )-----------( 154      ( 14 Oct 2022 06:23 )             35.1     10-14    137  |  102  |  14  ----------------------------<  110<H>  4.2   |  26  |  0.70    Ca    8.7      14 Oct 2022 06:23  Phos  3.5     10-14  Mg     2.20     10-14    TPro  6.7  /  Alb  3.1<L>  /  TBili  0.6  /  DBili  x   /  AST  34  /  ALT  37  /  AlkPhos  73  10-13    PTT - ( 14 Oct 2022 06:23 )  PTT:90.1 sec    CAPILLARY BLOOD GLUCOSE          Lower Extremity Physical Exam:  Vascular: DP/PT 2/4, B/L, CFT <3 seconds B/L, Temperature gradient warm to warm L, warm to cool R/.   Neuro: Epicritic sensation diminished to the level of midfoot, B/L.  Musculoskeletal/Ortho: pitting edema to ankle and lower leg B/L  Skin: R foot no wounds, no acute signs of infection. L foot s/p bullae deroofed on 10/12, wound at submet 3 area to dermis with tracking sinus to 3rd MPJ capsule, tracking noted distally ~1cm, mild serosanguineous drainage, no pus.     RADIOLOGY & ADDITIONAL TESTS:

## 2022-10-14 NOTE — PROGRESS NOTE ADULT - PROBLEM SELECTOR PLAN 1
Fever, cough, shortness of breath, diarrhea x 2 days. Likely 2/2 pneumonia.  - CT chest 10/11 w/ diffuse GGO  - BCx growing MSSA in all bottles, repeat NGTD so far  - f/u SCx  - f/u legionella cx, histoplasma urine antigen, chlamydia Ab/serology,   - monitor SpO2  - s/p CTX and azithro, now on cefazolin 2g IV q8 (10/12 - ) and levaquin 500 mg PO daily (10/12 - ) Fever, cough, shortness of breath, diarrhea x 2 days. Likely 2/2 pneumonia. Now with MSSA bacteremia. Concern for possibly skin source, podiatry deroofed abscess on left erythematous toe.  - CT chest 10/11 w/ diffuse GGO c/f multifocal pneumonia  - BCx 10/10: MSSA  - BCx 10/12: Gram positive   - BCx 10/14: pending  - Wound Cx 10/12: Many S. aureus  - legionella negative  - f/u histoplasma urine antigen, chlamydia Ab/serology,   - ID, podiatry consulted, appreciate recs  - s/p CTX and azithro  - c/w cefazolin 2g IV q8 (10/12 - ) and levaquin 500 mg PO daily (10/12 - )

## 2022-10-14 NOTE — PROGRESS NOTE ADULT - ASSESSMENT
67y Male with L foot submet 3 wound to capsule  - Patient seen and evaluated  - Afebrile, WBC 8.08.  - R foot no wounds, no acute signs of infection. L foot s/p bullae deroofed on 10/12, wound at submet 3 area to dermis with tracking sinus to 3rd MPJ capsule, tracking noted distally ~1cm, mild serosanguineous drainage, no pus.   - L foot MR pending.  - L foot wound culture: Staph lexi (prelim).  - ID recs, appreciated.  - Pod plan local wound care vs left foot bone biopsy pending MRI.  - Seen with attending.

## 2022-10-14 NOTE — PROGRESS NOTE ADULT - ASSESSMENT
Javier Washington 67M history of lymphedema (podiatry, vascular surgery outpatient), previous admission for pneumonia in 2019 c/b pAF with spontaneous resolution to NSR not discharged on AC, p/w SOB, fever, diarrhea x 2 days. ED with GGOs, elevated procal likely pneumonia, started on ceftriaxone/azithromycin. Again with AF on monitor, asymptomatic.  Javier Washington 67M history of lymphedema (podiatry, vascular surgery outpatient), previous admission for pneumonia in 2019 c/b pAF with spontaneous resolution to NSR not discharged on AC, p/w SOB, fever, diarrhea x 2 days. ED with GGOs, elevated procal likely pneumonia, started on ceftriaxone/azithromycin. BCx with MSSA bacteremia. Transitioned to ancef and levaquin per ID. Podiatry consulted for left foot abscess. Additionally with AF, asymptomatic, on heparin gtt.

## 2022-10-15 ENCOUNTER — TRANSCRIPTION ENCOUNTER (OUTPATIENT)
Age: 67
End: 2022-10-15

## 2022-10-15 LAB
-  AMPICILLIN/SULBACTAM: SIGNIFICANT CHANGE UP
-  CEFAZOLIN: SIGNIFICANT CHANGE UP
-  CLINDAMYCIN: SIGNIFICANT CHANGE UP
-  ERYTHROMYCIN: SIGNIFICANT CHANGE UP
-  GENTAMICIN: SIGNIFICANT CHANGE UP
-  OXACILLIN: SIGNIFICANT CHANGE UP
-  PENICILLIN: SIGNIFICANT CHANGE UP
-  RIFAMPIN: SIGNIFICANT CHANGE UP
-  TETRACYCLINE: SIGNIFICANT CHANGE UP
-  TRIMETHOPRIM/SULFAMETHOXAZOLE: SIGNIFICANT CHANGE UP
-  VANCOMYCIN: SIGNIFICANT CHANGE UP
A1C WITH ESTIMATED AVERAGE GLUCOSE RESULT: 5.8 % — HIGH (ref 4–5.6)
ANION GAP SERPL CALC-SCNC: 9 MMOL/L — SIGNIFICANT CHANGE UP (ref 7–14)
APTT BLD: 37 SEC — HIGH (ref 27–36.3)
BUN SERPL-MCNC: 17 MG/DL — SIGNIFICANT CHANGE UP (ref 7–23)
CALCIUM SERPL-MCNC: 8.8 MG/DL — SIGNIFICANT CHANGE UP (ref 8.4–10.5)
CHLORIDE SERPL-SCNC: 101 MMOL/L — SIGNIFICANT CHANGE UP (ref 98–107)
CO2 SERPL-SCNC: 26 MMOL/L — SIGNIFICANT CHANGE UP (ref 22–31)
CREAT SERPL-MCNC: 0.86 MG/DL — SIGNIFICANT CHANGE UP (ref 0.5–1.3)
CULTURE RESULTS: SIGNIFICANT CHANGE UP
EGFR: 96 ML/MIN/1.73M2 — SIGNIFICANT CHANGE UP
ESTIMATED AVERAGE GLUCOSE: 120 — SIGNIFICANT CHANGE UP
GLUCOSE SERPL-MCNC: 120 MG/DL — HIGH (ref 70–99)
HCT VFR BLD CALC: 35.9 % — LOW (ref 39–50)
HGB BLD-MCNC: 11.4 G/DL — LOW (ref 13–17)
MAGNESIUM SERPL-MCNC: 2.2 MG/DL — SIGNIFICANT CHANGE UP (ref 1.6–2.6)
MCHC RBC-ENTMCNC: 29.7 PG — SIGNIFICANT CHANGE UP (ref 27–34)
MCHC RBC-ENTMCNC: 31.8 GM/DL — LOW (ref 32–36)
MCV RBC AUTO: 93.5 FL — SIGNIFICANT CHANGE UP (ref 80–100)
METHOD TYPE: SIGNIFICANT CHANGE UP
NRBC # BLD: 0 /100 WBCS — SIGNIFICANT CHANGE UP (ref 0–0)
NRBC # FLD: 0 K/UL — SIGNIFICANT CHANGE UP (ref 0–0)
PHOSPHATE SERPL-MCNC: 3.6 MG/DL — SIGNIFICANT CHANGE UP (ref 2.5–4.5)
PLATELET # BLD AUTO: 227 K/UL — SIGNIFICANT CHANGE UP (ref 150–400)
POTASSIUM SERPL-MCNC: 4.2 MMOL/L — SIGNIFICANT CHANGE UP (ref 3.5–5.3)
POTASSIUM SERPL-SCNC: 4.2 MMOL/L — SIGNIFICANT CHANGE UP (ref 3.5–5.3)
RBC # BLD: 3.84 M/UL — LOW (ref 4.2–5.8)
RBC # FLD: 13.7 % — SIGNIFICANT CHANGE UP (ref 10.3–14.5)
SODIUM SERPL-SCNC: 136 MMOL/L — SIGNIFICANT CHANGE UP (ref 135–145)
SPECIMEN SOURCE: SIGNIFICANT CHANGE UP
WBC # BLD: 8.67 K/UL — SIGNIFICANT CHANGE UP (ref 3.8–10.5)
WBC # FLD AUTO: 8.67 K/UL — SIGNIFICANT CHANGE UP (ref 3.8–10.5)

## 2022-10-15 PROCEDURE — 99232 SBSQ HOSP IP/OBS MODERATE 35: CPT | Mod: GC

## 2022-10-15 RX ADMIN — Medication 100 MILLIGRAM(S): at 00:53

## 2022-10-15 RX ADMIN — MUPIROCIN 1 APPLICATION(S): 20 OINTMENT TOPICAL at 06:30

## 2022-10-15 RX ADMIN — Medication 100 MILLIGRAM(S): at 10:02

## 2022-10-15 RX ADMIN — ENOXAPARIN SODIUM 90 MILLIGRAM(S): 100 INJECTION SUBCUTANEOUS at 10:02

## 2022-10-15 RX ADMIN — LIDOCAINE 1 PATCH: 4 CREAM TOPICAL at 17:56

## 2022-10-15 RX ADMIN — ENOXAPARIN SODIUM 90 MILLIGRAM(S): 100 INJECTION SUBCUTANEOUS at 19:48

## 2022-10-15 RX ADMIN — Medication 100 MILLIGRAM(S): at 17:51

## 2022-10-15 RX ADMIN — LIDOCAINE 1 PATCH: 4 CREAM TOPICAL at 19:20

## 2022-10-15 RX ADMIN — MUPIROCIN 1 APPLICATION(S): 20 OINTMENT TOPICAL at 17:51

## 2022-10-15 RX ADMIN — LIDOCAINE 1 PATCH: 4 CREAM TOPICAL at 00:43

## 2022-10-15 NOTE — DISCHARGE NOTE PROVIDER - NSFOLLOWUPCLINICS_GEN_ALL_ED_FT
[facility]  Cardiology  [address] [place] [address]  Phone: [phone]  Fax:     [facility] [place]  Internal Medicine  [address] [place] [address]  Phone: [phone]  Fax: [phone]    [facility]  Podiatry  [address] [facility] - [address] [place] [address]  Phone: [phone]  Fax:      [facility]  Cardiology  [address] [place] [id number]  Phone: [phone]  Fax:     [facility]  Podiatry  [address] [place] [address]  Phone: [phone]  Fax:

## 2022-10-15 NOTE — PROGRESS NOTE ADULT - PROBLEM SELECTOR PLAN 4
Hx of AF with spontaneous resolution previous admission also in setting of pneumonia. Pneumonia is again likely trigger.   - CHADVASC 1  - c/w heparin gtt, consider transition to lovenox  - control HR < 110 per RACE trial  - TTE wnl   - TSH wnl  - monitor HR  - recommend f/u with EP outpatient Hx of AF with spontaneous resolution previous admission also in setting of pneumonia. Pneumonia is again likely trigger.   - CHADVASC 1  - s/p heparin gtt, transitioned to lovenox therapeutic  - spontaneously converted to NSR on 10/14  - CHADVASC low, consider d/cing anticoagulation  - TTE wnl   - TSH wnl  - monitor HR  - recommend f/u with EP outpatient

## 2022-10-15 NOTE — DISCHARGE NOTE PROVIDER - NSFOLLOWUPCLINICSTOKEN_GEN_ALL_ED_FT
[id number]: || ||00\01||False;[id number]: || ||00\01||False;[id number]: || ||00\01||False; [id number]: || ||00\01||False;[id number]: || ||00\01||False;

## 2022-10-15 NOTE — PROGRESS NOTE ADULT - ASSESSMENT
Javier Washington 67M history of lymphedema (podiatry, vascular surgery outpatient), previous admission for pneumonia in 2019 c/b pAF with spontaneous resolution to NSR not discharged on AC, p/w SOB, fever, diarrhea x 2 days. ED with GGOs, elevated procal likely pneumonia, started on ceftriaxone/azithromycin. BCx with MSSA bacteremia. Transitioned to ancef and levaquin per ID. Podiatry consulted for left foot abscess. Additionally with AF, asymptomatic, on heparin gtt.  Javier Washington 67M history of lymphedema (podiatry, vascular surgery outpatient), previous admission for pneumonia in 2019 c/b pAF with spontaneous resolution to NSR not discharged on AC, p/w SOB, fever, diarrhea x 2 days. ED with GGOs, elevated procal likely pneumonia, started on ceftriaxone/azithromycin. BCx with MSSA bacteremia. Transitioned to ancef and levaquin per ID. Podiatry consulted for left foot abscess. Additionally with AF, asymptomatic, on lovenox, therapeutic.

## 2022-10-15 NOTE — DISCHARGE NOTE PROVIDER - NSDCCPCAREPLAN_GEN_ALL_CORE_FT
PRINCIPAL DISCHARGE DIAGNOSIS  Diagnosis: Sepsis  Assessment and Plan of Treatment: You presented with signs of infection. Infection was found to be in your lungs, foot, and blood. The bacteria in the blood is called methicillin sensitive staph aureus and you were treated with IV antibiotics. While on these antibiotics you continued to improve. Please continue to take your antibiotics as prescribed below and follow up with your primary care physician.  If you again have a fever or symptoms similar to the ones prior to coming to the hospital, please call your primary care physician for further guidance.      SECONDARY DISCHARGE DIAGNOSES  Diagnosis: Lymphedema  Assessment and Plan of Treatment: You presented with swelling and redness of your toe. This was found to be an infection. Podiatry was called and the infection was drained. Please continue to take the antibiotics as prescribed and follow up with your podiatrist in 1 - 2 weeks.    Diagnosis: Atrial fibrillation  Assessment and Plan of Treatment: Your heart this hospitalization was found to be in a rhyhtm called atrial fibrillation. This rhyhtm was also identified the last time you were admitted to the hospital. It is possible that you are in this rhythm outside of the hospital as well. This is not a normal rhyhtm, although you continued to say your heart was fine and declined testing because you had no chest pain, chest pain is not always a good indicator of heart function. Atrial fibrillation can result in complications such as stroke and after having a discussion of the risks and benefits of preventing the stroke with anticoagulation you decided to ***. Please follow up with your primary care physician and outpatient electrophysiology to continue too have this discussion.     PRINCIPAL DISCHARGE DIAGNOSIS  Diagnosis: Sepsis  Assessment and Plan of Treatment: You presented with signs of infection. Infection was found to be in your lungs, foot, and blood. The bacteria in the blood is called methicillin sensitive staph aureus and you were treated with IV antibiotics. While on these antibiotics you continued to improve. Please continue to take your IV antibiotics as prescribed till 11/12/2021  and follow up with your primary care physician.  If you again have a fever or symptoms similar to the ones prior to coming to the hospital, please call your primary care physician for further guidance.      SECONDARY DISCHARGE DIAGNOSES  Diagnosis: Atrial fibrillation  Assessment and Plan of Treatment: Your heart this hospitalization was found to be in a rhythm called atrial fibrillation. This rhythm was also identified the last time you were admitted to the hospital. It is possible that you are in this rhythm outside of the hospital as well. We started you on medications to control your heart rate. Atrial fibrillation can result in complications such as stroke and after having a discussion of the risks and benefits of preventing the stroke with anticoagulation we started you on Eliquis 5mg two time a day. Please follow up with your primary care physician and outpatient electrophysiology to continue too have this discussion.    Diagnosis: Lymphedema  Assessment and Plan of Treatment: You presented with swelling and redness of your toe. This was found to be an infection. Podiatry was called and the infection was drained. Please continue to take the antibiotics as prescribed and follow up with your podiatrist in 1 - 2 weeks.     PRINCIPAL DISCHARGE DIAGNOSIS  Diagnosis: Sepsis  Assessment and Plan of Treatment: You presented with signs of infection. Infection was found to be in your lungs, foot, and blood. The bacteria in the blood is called methicillin sensitive staph aureus and you were treated with IV antibiotics. While on these antibiotics you continued to improve. Please continue to take your IV antibiotics as prescribed till 11/12/2021 and follow up with your primary care physician.  It is important to follow up with the results of a few other tests. One is called a histoplasmosis antigen.  Your outpatient provider should be able to see these results and talk with you more about them.  The other is for a positive antibody you had in the hospital for a bacteria called psittacocus- you should receive an outpatient chest x-ray, which can be set up by your outpatient provider.    If you again have a fever or symptoms similar to the ones prior to coming to the hospital, please call your primary care physician for further guidance.      SECONDARY DISCHARGE DIAGNOSES  Diagnosis: Atrial fibrillation  Assessment and Plan of Treatment: Your heart this hospitalization was found to be in a rhythm called atrial fibrillation. This rhythm was also identified the last time you were admitted to the hospital. It is possible that you are in this rhythm outside of the hospital as well. We started you on medications to control your heart rate. Atrial fibrillation can result in complications such as stroke and after having a discussion of the risks and benefits of preventing the stroke with anticoagulation we started you on Eliquis 5mg two time a day. Please follow up with your primary care physician and outpatient electrophysiology to continue too have this discussion.    Diagnosis: Lymphedema  Assessment and Plan of Treatment: You presented with swelling and redness of your toe. This was found to be an infection. Podiatry was called and the infection was drained. Please continue to take the antibiotics as prescribed and follow up with your podiatrist in 1 - 2 weeks.     PRINCIPAL DISCHARGE DIAGNOSIS  Diagnosis: Sepsis  Assessment and Plan of Treatment: You presented with signs of infection. Infection was found to be in your lungs, foot, and blood. The bacteria in the blood is called methicillin sensitive staph aureus and you were treated with IV antibiotics. While on these antibiotics you continued to improve. Please continue to take your IV antibiotics as prescribed till 11/12/2021 and follow up with your primary care physician. You will also need a repeat echocardiogram in 3 weeks.   It is important to follow up with the results of a few other tests. One is called a histoplasmosis antigen.  Your outpatient provider should be able to see these results and talk with you more about them.  The other is for a positive antibody you had in the hospital for a bacteria called psittacocus- you should receive an outpatient chest x-ray, which can be set up by your outpatient provider.    If you again have a fever or symptoms similar to the ones prior to coming to the hospital, please call your primary care physician for further guidance.      SECONDARY DISCHARGE DIAGNOSES  Diagnosis: Atrial fibrillation  Assessment and Plan of Treatment: Your heart this hospitalization was found to be in a rhythm called atrial fibrillation. This rhythm was also identified the last time you were admitted to the hospital. It is possible that you are in this rhythm outside of the hospital as well. We started you on medications to control your heart rate. Atrial fibrillation can result in complications such as stroke and after having a discussion of the risks and benefits of preventing the stroke with anticoagulation we started you on Eliquis 5mg two time a day. Please follow up with your primary care physician and outpatient electrophysiology to continue too have this discussion.    Diagnosis: Lymphedema  Assessment and Plan of Treatment: You presented with swelling and redness of your toe. This was found to be an infection. Podiatry was called and the infection was drained. Please continue to take the antibiotics as prescribed and follow up with your podiatrist in 1 - 2 weeks.     PRINCIPAL DISCHARGE DIAGNOSIS  Diagnosis: Sepsis  Assessment and Plan of Treatment: You presented with signs of infection. Infection was found to be in your lungs, foot, and blood. The bacteria in the blood is called methicillin sensitive staph aureus and you were treated with IV antibiotics. While on these antibiotics you continued to improve. Please continue to take your IV antibiotics as prescribed till 11/12/2021 and follow up with your primary care physician. You will also need a repeat echocardiogram in 3 weeks.   It is important to follow up with the results of a few other tests. One is called a histoplasmosis antigen.  Your outpatient provider should be able to see these results and talk with you more about them.  The other is for a positive antibody you had in the hospital for a bacteria called psittacocus- you should receive an outpatient chest x-ray, which can be set up by your outpatient provider.    If you again have a fever or symptoms similar to the ones prior to coming to the hospital, please call your primary care physician for further guidance.      SECONDARY DISCHARGE DIAGNOSES  Diagnosis: Atrial fibrillation  Assessment and Plan of Treatment: Your heart this hospitalization was found to be in a rhythm called atrial fibrillation. This rhythm was also identified the last time you were admitted to the hospital. It is possible that you are in this rhythm outside of the hospital as well. We started you on medications to control your heart rate. Atrial fibrillation can result in complications such as stroke and after having a discussion of the risks and benefits of preventing the stroke with anticoagulation we started you on Eliquis 5mg two time a day. Please follow up with your primary care physician and outpatient electrophysiology. Continue to take your Eliquis and Diltiazem as directed.    Diagnosis: Lymphedema  Assessment and Plan of Treatment: You presented with swelling and redness of your toe. This was found to be an infection. Podiatry was called and the infection was drained. Please continue to take the antibiotics as prescribed and follow up with your podiatrist in 1 - 2 weeks.

## 2022-10-15 NOTE — DISCHARGE NOTE PROVIDER - PROVIDER TOKENS
FREE:[LAST:[mack],FIRST:[Emile],PHONE:[phone]],FAX:[(   )    -],FOLLOWUP:[2 weeks],ESTABLISHEDPATIENT:[T]] FREE:[LAST:[Gt],FIRST:[Mariel],PHONE:[(   )    -],FAX:[(   )    -],ADDRESS:[[address] [place] [address], [id number]],SCHEDULEDAPPT:[11/01/2022],SCHEDULEDAPPTTIME:[02:20 PM]]

## 2022-10-15 NOTE — DISCHARGE NOTE PROVIDER - NSDCFUADDINST_GEN_ALL_CORE_FT
Podiatry Discharge Instructions:  Follow up: Please follow up with Dr. Richards within 1 week of discharge from the hospital, please call [phone] for appointment and discuss that you recently were seen in the hospital.  Wound Care: Please apply packing to the left foot wound followed by 4x4 gauze and patti daily.  Weight bearing: Please weight bear as tolerated to the left foot in a surgical shoe.  Antibiotics: Please continue as instructed.

## 2022-10-15 NOTE — DISCHARGE NOTE PROVIDER - CARE PROVIDER_API CALL
Emile mack  Phone: [phone]  Fax: (   )    -  Established Patient  Follow Up Time: 2 weeks   Mariel Del Real  [address]  [place], [place], [address]  Phone: (   )    -  Fax: (   )    -  Scheduled Appointment: 11/01/2022 02:20 PM

## 2022-10-15 NOTE — PROGRESS NOTE ADULT - PROBLEM SELECTOR PLAN 5
DVT PPx: heparin gtt  Diet: regular  Dispo: home DVT PPx: lovenox therapeutic  Diet: regular  Dispo: home

## 2022-10-15 NOTE — PROGRESS NOTE ADULT - PROBLEM SELECTOR PLAN 1
Fever, cough, shortness of breath, diarrhea x 2 days. Likely 2/2 pneumonia. Now with MSSA bacteremia. Concern for possibly skin source, podiatry deroofed abscess on left erythematous toe.  - CT chest 10/11 w/ diffuse GGO c/f multifocal pneumonia  - BCx 10/10: MSSA  - BCx 10/12: Gram positive   - BCx 10/14: pending  - Wound Cx 10/12: Many S. aureus  - legionella negative  - f/u histoplasma urine antigen, chlamydia Ab/serology,   - ID, podiatry consulted, appreciate recs  - s/p CTX and azithro  - c/w cefazolin 2g IV q8 (10/12 - ) and levaquin 500 mg PO daily (10/12 - ) Fever, cough, shortness of breath, diarrhea x 2 days. Likely 2/2 pneumonia. Now with MSSA bacteremia. Concern for possibly skin source, podiatry deroofed abscess on left erythematous toe.  - CT chest 10/11 w/ diffuse GGO c/f multifocal pneumonia  - BCx 10/10: MSSA  - BCx 10/12: Gram positive   - BCx 10/15: pending  - Wound Cx 10/12: Many S. aureus  - legionella negative  - f/u histoplasma urine antigen  - Chlamydia pneumonia IgG +, otherwise chlamydia labs negative  - ID, podiatry consulted, appreciate recs  - s/p CTX and azithro  - c/w cefazolin 2g IV q8 (10/12 - ) and levaquin 500 mg PO daily (10/12 - )

## 2022-10-15 NOTE — DISCHARGE NOTE PROVIDER - NSDCMRMEDTOKEN_GEN_ALL_CORE_FT
10cc NS flush pre and post abx :   cbc and cmp: Weekly for 5 weeks  Pt will follow up with Dr. Victoriano Benites (infectious disease)  ICD-10: R78.81  cefazolin: 2 gram(s) intravenous every 8 hours   End date 11/12/22   10cc NS flush pre and post abx :   cbc and cmp: Weekly for 5 weeks  Pt will follow up with Dr. Victoriano Benites (infectious disease)  ICD-10: R78.81  cefazolin: 2 gram(s) intravenous every 8 hours   End date 11/12/22  dilTIAZem 180 mg/24 hours oral capsule, extended release: 1 cap(s) orally once a day   Eliquis 5 mg oral tablet: 1 tab(s) orally 2 times a day

## 2022-10-15 NOTE — PROGRESS NOTE ADULT - PROBLEM SELECTOR PLAN 3
History of lymphedema treated by podiatry and [facility] vascular surgery. Patient notes redness of toe comes and goes, with swelling and warmth of toe 3 days prior to admission that is resolving.  - appreciate podiatry recs, possible source of infection given MSSA bacteremia  - c/w cefazolin per ID  - Wound Cx: many S. aureus  - pending MRI L foot to r/o OM as xray was remarkable for soft tissue swelling History of lymphedema treated by podiatry and [facility] vascular surgery. Patient notes redness of toe comes and goes, with swelling and warmth of toe 3 days prior to admission that is resolving.  - appreciate podiatry recs, possible source of infection given MSSA bacteremia  - c/w cefazolin, levaquin per ID  - Wound Cx: many S. aureus  - pending MRI L foot to r/o OM as xray was remarkable for soft tissue swelling

## 2022-10-15 NOTE — DISCHARGE NOTE PROVIDER - NSDCFUADDAPPT_GEN_ALL_CORE_FT
Follow up with the following providers, if you would like to see one of our specialists their phone numbers are listed above.  1. primary care provider 1 - 2 weeks  2. podiatry 1 - 2 weeks  3. electrophysiology 2 - 4 weeks

## 2022-10-15 NOTE — PROGRESS NOTE ADULT - SUBJECTIVE AND OBJECTIVE BOX
Kev Montgomery PGY1  pager [phone] or Teams or check resident tab for coverage    Patient is a 67y old  Male who presents with a chief complaint of shortness of breath, fever (15 Oct 2022 05:00)    SUBJECTIVE / OVERNIGHT EVENTS:    NAEO.  Patient this morning is,    Brief Daily Plan:    MEDICATIONS  MEDICATIONS  (STANDING):  ceFAZolin   IVPB      ceFAZolin   IVPB 2000 milliGRAM(s) IV Intermittent every 8 hours  enoxaparin Injectable 90 milliGRAM(s) SubCutaneous every 12 hours  influenza  Vaccine (HIGH DOSE) 0.7 milliLiter(s) IntraMuscular once  levoFLOXacin  Tablet 500 milliGRAM(s) Oral every 24 hours  lidocaine   4% Patch 1 Patch Transdermal daily  mupirocin 2% Ointment 1 Application(s) Both Nostrils two times a day    MEDICATIONS  (PRN):  acetaminophen     Tablet .. 650 milliGRAM(s) Oral every 6 hours PRN Temp greater or equal to 38C (100.4F), Mild Pain (1 - 3), Moderate Pain (4 - 6), Severe Pain (7 - 10)  cyclobenzaprine 10 milliGRAM(s) Oral three times a day PRN Muscle Spasm  ibuprofen  Tablet. 600 milliGRAM(s) Oral three times a day PRN Mild Pain (1 - 3), Moderate Pain (4 - 6), Severe Pain (7 - 10)  lidocaine   4% Patch 1 Patch Transdermal daily PRN pain MSK      VITALS  Vital Signs Last 24 Hrs  T(C): 36.4 (15 Oct 2022 06:15), Max: 36.9 (14 Oct 2022 12:34)  T(F): 97.6 (15 Oct 2022 06:15), Max: 98.4 (14 Oct 2022 12:34)  HR: 67 (15 Oct 2022 06:15) (67 - 77)  BP: 115/60 (15 Oct 2022 06:15) (115/60 - 141/76)  BP(mean): --  RR: 17 (15 Oct 2022 06:15) (16 - 17)  SpO2: 97% (15 Oct 2022 06:15) (97% - 99%)    Parameters below as of 15 Oct 2022 06:15  Patient On (Oxygen Delivery Method): room air        PHYSICAL EXAM:  GENERAL: no distress  PSYCH: A&O x3  HEAD: Atraumatic, Normocephalic  NECK: Supple, No JVD  CHEST/LUNG: clear to auscultation bilaterally  HEART: regular rate and rhythm, no murmurs  ABDOMEN: nontender to palpation, no rebound tenderness/guarding  EXTREMITIES: no edema on bilateral LE  NEUROLOGY: no focal neurologic deficit  SKIN: No rashes or lesions    LABS:  All labs personally Reviewed.    RADIOLOGY & ADDITIONAL TESTS:  All imaging personally Reviewed.  Kev Montgomery PGY1  pager [phone] or Teams or check resident tab for coverage    Patient is a 67y old  Male who presents with a chief complaint of shortness of breath, fever (15 Oct 2022 05:00)    SUBJECTIVE / OVERNIGHT EVENTS:    NAEO.  Patient this morning is doing well. Denies any fever, chills. Pain is improving.   Tele reviewed: spontaneously converted to NSR    Brief Daily Plan:  ·	c/w antibiotics  ·	transitioned to lovenox therapeutic yesterday, consider d/c anticoagulation  ·	pending OMI on monday  ·	f/u repeat BCx until clear    MEDICATIONS  MEDICATIONS  (STANDING):  ceFAZolin   IVPB      ceFAZolin   IVPB 2000 milliGRAM(s) IV Intermittent every 8 hours  enoxaparin Injectable 90 milliGRAM(s) SubCutaneous every 12 hours  influenza  Vaccine (HIGH DOSE) 0.7 milliLiter(s) IntraMuscular once  levoFLOXacin  Tablet 500 milliGRAM(s) Oral every 24 hours  lidocaine   4% Patch 1 Patch Transdermal daily  mupirocin 2% Ointment 1 Application(s) Both Nostrils two times a day    MEDICATIONS  (PRN):  acetaminophen     Tablet .. 650 milliGRAM(s) Oral every 6 hours PRN Temp greater or equal to 38C (100.4F), Mild Pain (1 - 3), Moderate Pain (4 - 6), Severe Pain (7 - 10)  cyclobenzaprine 10 milliGRAM(s) Oral three times a day PRN Muscle Spasm  ibuprofen  Tablet. 600 milliGRAM(s) Oral three times a day PRN Mild Pain (1 - 3), Moderate Pain (4 - 6), Severe Pain (7 - 10)  lidocaine   4% Patch 1 Patch Transdermal daily PRN pain MSK      VITALS  Vital Signs Last 24 Hrs  T(C): 36.4 (15 Oct 2022 06:15), Max: 36.9 (14 Oct 2022 12:34)  T(F): 97.6 (15 Oct 2022 06:15), Max: 98.4 (14 Oct 2022 12:34)  HR: 67 (15 Oct 2022 06:15) (67 - 77)  BP: 115/60 (15 Oct 2022 06:15) (115/60 - 141/76)  BP(mean): --  RR: 17 (15 Oct 2022 06:15) (16 - 17)  SpO2: 97% (15 Oct 2022 06:15) (97% - 99%)    Parameters below as of 15 Oct 2022 06:15  Patient On (Oxygen Delivery Method): room air        PHYSICAL EXAM:  GENERAL: no distress  PSYCH: A&O x3  HEAD: Atraumatic, Normocephalic  NECK: Supple, No JVD  CHEST/LUNG: mild crackles present, scattered   HEART: regular rate and rhythm, no murmurs  ABDOMEN: nontender to palpation, no rebound tenderness/guarding  EXTREMITIES: Left lower foot under dressing. Right knee non-tender  NEUROLOGY: no focal neurologic deficit  SKIN: No rashes or lesions    LABS:  All labs personally Reviewed.    RADIOLOGY & ADDITIONAL TESTS:  All imaging personally Reviewed.

## 2022-10-15 NOTE — DISCHARGE NOTE PROVIDER - NSDCFUSCHEDAPPT_GEN_ALL_CORE_FT
Rony Ruiz  [facility]  [facility] [phone]  Scheduled Appointment: 11/11/2022     Mariel Del Real  [facility]  [address]  Scheduled Appointment: 11/01/2022    Rony Ruiz  [facility]  E[address]OPH [phone]  Scheduled Appointment: 11/11/2022

## 2022-10-15 NOTE — DISCHARGE NOTE PROVIDER - NSDCCPTREATMENT_GEN_ALL_CORE_FT
PRINCIPAL PROCEDURE  Procedure: MRI  Findings and Treatment: Findings:  Soft tissue ulcer along the plantar aspectof the third toe with   surrounding cellulitis. No drainable fluid collection seen.  There is chronic appearing dorsal subluxation/dislocation of the second   proximal phalanx at the level of the second metatarsophalangeal joint,   with chronic intra-articular fracturing of the second metatarsal   head/neck. There is osseous edema with areas of T1 marrow signal   abnormality within the base of the second proximal phalanx and within the   fragmented second metatarsal head, which may be posttraumatic or be   related to septic arthritis/osteomyelitis..  Osseous edema with areas of minimal T1 marrow signal abnormality within   the first and fourth distal phalanges, which may be related to artifact   or be related to early osteomyelitis.  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  Lisfranc ligament is intact. Fatty atrophy throughout the intrinsic   muscles of the foot, likely related to disuse.  Impression:  Soft tissue ulcer along the plantar aspectof the third toe with   surrounding cellulitis. No drainable fluid collection.  Chronic dorsal subluxation/dislocation of the second proximal phalanx and   at the level of the second metatarsophalangeal joint with chronic   fracturing of the secondmetatarsal head/neck. Osseous edema with areas   of T1 marrow signal abnormality within the base of the second proximal   phalanx and within the fragmented second metatarsal head, which may be   posttraumatic or be related to septic arthritis/osteomyelitis.  Osseous edema with areas of minimal T1 marrow signal abnormality within   the first and fourth distal phalanges, which may be related artifact or   be related to early osteomyelitis. Recommend correlation for overlying   ulcer.  --- End of Report ---

## 2022-10-16 LAB
ANION GAP SERPL CALC-SCNC: 10 MMOL/L — SIGNIFICANT CHANGE UP (ref 7–14)
BUN SERPL-MCNC: 15 MG/DL — SIGNIFICANT CHANGE UP (ref 7–23)
CALCIUM SERPL-MCNC: 8.8 MG/DL — SIGNIFICANT CHANGE UP (ref 8.4–10.5)
CHLORIDE SERPL-SCNC: 99 MMOL/L — SIGNIFICANT CHANGE UP (ref 98–107)
CO2 SERPL-SCNC: 26 MMOL/L — SIGNIFICANT CHANGE UP (ref 22–31)
CREAT SERPL-MCNC: 0.77 MG/DL — SIGNIFICANT CHANGE UP (ref 0.5–1.3)
EGFR: 99 ML/MIN/1.73M2 — SIGNIFICANT CHANGE UP
GLUCOSE SERPL-MCNC: 129 MG/DL — HIGH (ref 70–99)
HCT VFR BLD CALC: 38.2 % — LOW (ref 39–50)
HGB BLD-MCNC: 12.2 G/DL — LOW (ref 13–17)
MAGNESIUM SERPL-MCNC: 2.1 MG/DL — SIGNIFICANT CHANGE UP (ref 1.6–2.6)
MCHC RBC-ENTMCNC: 29.6 PG — SIGNIFICANT CHANGE UP (ref 27–34)
MCHC RBC-ENTMCNC: 31.9 GM/DL — LOW (ref 32–36)
MCV RBC AUTO: 92.7 FL — SIGNIFICANT CHANGE UP (ref 80–100)
NRBC # BLD: 0 /100 WBCS — SIGNIFICANT CHANGE UP (ref 0–0)
NRBC # FLD: 0 K/UL — SIGNIFICANT CHANGE UP (ref 0–0)
PHOSPHATE SERPL-MCNC: 3.6 MG/DL — SIGNIFICANT CHANGE UP (ref 2.5–4.5)
PLATELET # BLD AUTO: 271 K/UL — SIGNIFICANT CHANGE UP (ref 150–400)
POTASSIUM SERPL-MCNC: 4.4 MMOL/L — SIGNIFICANT CHANGE UP (ref 3.5–5.3)
POTASSIUM SERPL-SCNC: 4.4 MMOL/L — SIGNIFICANT CHANGE UP (ref 3.5–5.3)
RBC # BLD: 4.12 M/UL — LOW (ref 4.2–5.8)
RBC # FLD: 13.6 % — SIGNIFICANT CHANGE UP (ref 10.3–14.5)
SODIUM SERPL-SCNC: 135 MMOL/L — SIGNIFICANT CHANGE UP (ref 135–145)
WBC # BLD: 10.62 K/UL — HIGH (ref 3.8–10.5)
WBC # FLD AUTO: 10.62 K/UL — HIGH (ref 3.8–10.5)

## 2022-10-16 PROCEDURE — 99232 SBSQ HOSP IP/OBS MODERATE 35: CPT | Mod: GC

## 2022-10-16 RX ADMIN — MUPIROCIN 1 APPLICATION(S): 20 OINTMENT TOPICAL at 05:35

## 2022-10-16 RX ADMIN — MUPIROCIN 1 APPLICATION(S): 20 OINTMENT TOPICAL at 18:33

## 2022-10-16 RX ADMIN — ENOXAPARIN SODIUM 90 MILLIGRAM(S): 100 INJECTION SUBCUTANEOUS at 08:10

## 2022-10-16 RX ADMIN — Medication 100 MILLIGRAM(S): at 18:33

## 2022-10-16 RX ADMIN — LIDOCAINE 1 PATCH: 4 CREAM TOPICAL at 05:51

## 2022-10-16 RX ADMIN — ENOXAPARIN SODIUM 90 MILLIGRAM(S): 100 INJECTION SUBCUTANEOUS at 19:30

## 2022-10-16 RX ADMIN — Medication 100 MILLIGRAM(S): at 09:41

## 2022-10-16 RX ADMIN — Medication 100 MILLIGRAM(S): at 01:14

## 2022-10-16 NOTE — PROGRESS NOTE ADULT - PROBLEM SELECTOR PLAN 1
Fever, cough, shortness of breath, diarrhea x 2 days. Likely 2/2 pneumonia. Now with MSSA bacteremia. Concern for possibly skin source, podiatry deroofed abscess on left erythematous toe.  - CT chest 10/11 w/ diffuse GGO c/f multifocal pneumonia  - BCx 10/10: MSSA  - BCx 10/12: Gram positive   - BCx 10/15: pending  - Wound Cx 10/12: Many S. aureus  - legionella negative  - f/u histoplasma urine antigen  - Chlamydia pneumonia IgG +, otherwise chlamydia labs negative  - ID, podiatry consulted, appreciate recs  - s/p CTX and azithro  - c/w cefazolin 2g IV q8 (10/12 - ) and levaquin 500 mg PO daily (10/12 - ) Fever, cough, shortness of breath, diarrhea x 2 days. Likely 2/2 pneumonia. Now with MSSA bacteremia. Concern for possibly skin source, podiatry deroofed abscess on left erythematous toe.  - CT chest 10/11 w/ diffuse GGO c/f multifocal pneumonia  - BCx 10/10: MSSA  - BCx 10/12: Gram positive   - BCx 10/15: NGTD, pending final  - Wound Cx 10/12: Many S. aureus  - legionella negative  - f/u histoplasma urine antigen  - Chlamydia pneumonia IgG +, otherwise chlamydia labs negative  - ID, podiatry consulted, appreciate recs  - s/p CTX and azithro  - c/w cefazolin 2g IV q8 (10/12 - ) and levaquin 500 mg PO daily (10/12 - )

## 2022-10-16 NOTE — PROGRESS NOTE ADULT - SUBJECTIVE AND OBJECTIVE BOX
Patient is a 67y old  Male who presents with a chief complaint of shortness of breath, fever (16 Oct 2022 06:56)       INTERVAL HPI/OVERNIGHT EVENTS:  Patient seen and evaluated at bedside.  Pt is resting comfortable in NAD. Denies N/V/F/C.    Allergies    No Known Allergies    Intolerances        Vital Signs Last 24 Hrs  T(C): 36.6 (16 Oct 2022 05:00), Max: 36.8 (15 Oct 2022 13:19)  T(F): 97.8 (16 Oct 2022 05:00), Max: 98.3 (15 Oct 2022 21:00)  HR: 78 (16 Oct 2022 05:00) (72 - 78)  BP: 167/73 (16 Oct 2022 05:00) (153/82 - 167/73)  BP(mean): --  RR: 18 (16 Oct 2022 05:00) (18 - 18)  SpO2: 96% (16 Oct 2022 05:00) (96% - 99%)    Parameters below as of 16 Oct 2022 05:00  Patient On (Oxygen Delivery Method): room air        LABS:                        12.2   10.62 )-----------( 271      ( 16 Oct 2022 04:43 )             38.2     10-16    135  |  99  |  15  ----------------------------<  129<H>  4.4   |  26  |  0.77    Ca    8.8      16 Oct 2022 04:43  Phos  3.6     10-16  Mg     2.10     10-16      PTT - ( 15 Oct 2022 05:53 )  PTT:37.0 sec    CAPILLARY BLOOD GLUCOSE          Lower Extremity Physical Exam:  Vascular: DP/PT 2/4, B/L, CFT <3 seconds B/L, Temperature gradient warm to warm L, warm to cool R/.   Neuro: Epicritic sensation diminished to the level of midfoot, B/L.  Musculoskeletal/Ortho: pitting edema to ankle and lower leg B/L  Skin: R foot no wounds, no acute signs of infection. L foot s/p bullae deroofed on 10/12, wound at submet 3 area to dermis with tracking sinus to 3rd MPJ capsule, tracking noted distally ~1cm, mild serosanguineous drainage, no pus.     RADIOLOGY & ADDITIONAL TESTS:

## 2022-10-16 NOTE — PROGRESS NOTE ADULT - PROBLEM SELECTOR PLAN 3
History of lymphedema treated by podiatry and [facility] vascular surgery. Patient notes redness of toe comes and goes, with swelling and warmth of toe 3 days prior to admission that is resolving.  - appreciate podiatry recs, possible source of infection given MSSA bacteremia  - c/w cefazolin, levaquin per ID  - Wound Cx: many S. aureus  - pending MRI L foot to r/o OM as xray was remarkable for soft tissue swelling

## 2022-10-16 NOTE — PROGRESS NOTE ADULT - ASSESSMENT
Javier Washington 67M history of lymphedema (podiatry, vascular surgery outpatient), previous admission for pneumonia in 2019 c/b pAF with spontaneous resolution to NSR not discharged on AC, p/w SOB, fever, diarrhea x 2 days. ED with GGOs, elevated procal likely pneumonia, started on ceftriaxone/azithromycin. BCx with MSSA bacteremia. Transitioned to ancef and levaquin per ID. Podiatry consulted for left foot abscess. Additionally with AF, asymptomatic, on lovenox, therapeutic.  Javier Washington 67M history of lymphedema (podiatry, vascular surgery outpatient), previous admission for pneumonia in 2019 c/b pAF with spontaneous resolution to NSR not discharged on AC, p/w SOB, fever, diarrhea x 2 days. ED with GGOs, elevated procal likely pneumonia, started on ceftriaxone/azithromycin. BCx with MSSA bacteremia. Transitioned to ancef and Levaquin per ID. Podiatry consulted for left foot abscess. Additionally with AF, asymptomatic, on lovenox, therapeutic.

## 2022-10-16 NOTE — PROGRESS NOTE ADULT - PROBLEM SELECTOR PLAN 4
Hx of AF with spontaneous resolution previous admission also in setting of pneumonia. Pneumonia is again likely trigger.   - CHADVASC 1  - s/p heparin gtt, transitioned to lovenox therapeutic  - spontaneously converted to NSR on 10/14  - CHADVASC low, consider d/cing anticoagulation  - TTE wnl   - TSH wnl  - monitor HR  - recommend f/u with EP outpatient

## 2022-10-16 NOTE — PROGRESS NOTE ADULT - SUBJECTIVE AND OBJECTIVE BOX
Kev Montgomery PGY1  pager [phone] or Teams or check resident tab for coverage    Patient is a 67y old  Male who presents with a chief complaint of shortness of breath,  fever (15 Oct 2022 10:33)    SUBJECTIVE / OVERNIGHT EVENTS:    NAEO.  Patient this morning is,    Brief Daily Plan:    MEDICATIONS  MEDICATIONS  (STANDING):  ceFAZolin   IVPB      ceFAZolin   IVPB 2000 milliGRAM(s) IV Intermittent every 8 hours  enoxaparin Injectable 90 milliGRAM(s) SubCutaneous every 12 hours  influenza  Vaccine (HIGH DOSE) 0.7 milliLiter(s) IntraMuscular once  levoFLOXacin  Tablet 500 milliGRAM(s) Oral every 24 hours  lidocaine   4% Patch 1 Patch Transdermal daily  mupirocin 2% Ointment 1 Application(s) Both Nostrils two times a day    MEDICATIONS  (PRN):  acetaminophen     Tablet .. 650 milliGRAM(s) Oral every 6 hours PRN Temp greater or equal to 38C (100.4F), Mild Pain (1 - 3), Moderate Pain (4 - 6), Severe Pain (7 - 10)  cyclobenzaprine 10 milliGRAM(s) Oral three times a day PRN Muscle Spasm  ibuprofen  Tablet. 600 milliGRAM(s) Oral three times a day PRN Mild Pain (1 - 3), Moderate Pain (4 - 6), Severe Pain (7 - 10)  lidocaine   4% Patch 1 Patch Transdermal daily PRN pain MSK      VITALS  Vital Signs Last 24 Hrs  T(C): 36.6 (16 Oct 2022 05:00), Max: 36.8 (15 Oct 2022 13:19)  T(F): 97.8 (16 Oct 2022 05:00), Max: 98.3 (15 Oct 2022 21:00)  HR: 78 (16 Oct 2022 05:00) (72 - 78)  BP: 167/73 (16 Oct 2022 05:00) (153/82 - 167/73)  BP(mean): --  RR: 18 (16 Oct 2022 05:00) (18 - 18)  SpO2: 96% (16 Oct 2022 05:00) (96% - 99%)    Parameters below as of 16 Oct 2022 05:00  Patient On (Oxygen Delivery Method): room air        PHYSICAL EXAM:  GENERAL: no distress  PSYCH: A&O x3  HEAD: Atraumatic, Normocephalic  NECK: Supple, No JVD  CHEST/LUNG: clear to auscultation bilaterally  HEART: regular rate and rhythm, no murmurs  ABDOMEN: nontender to palpation, no rebound tenderness/guarding  EXTREMITIES: no edema on bilateral LE  NEUROLOGY: no focal neurologic deficit  SKIN: No rashes or lesions    LABS:  All labs personally Reviewed.    RADIOLOGY & ADDITIONAL TESTS:  All imaging personally Reviewed.  Kev Montgomery PGY1  pager [phone] or Teams or check resident tab for coverage    Patient is a 67y old  Male who presents with a chief complaint of shortness of breath,  fever (15 Oct 2022 10:33)    SUBJECTIVE / OVERNIGHT EVENTS:    NAEO.  Patient this morning is doing well. Denies fever, chills, pain is improving. No pain in right knee.   Because he feels well, he does not want the OMI. Says his "heart feels fine" and that he's only here for lung treatment. States he'll come back, and get OMI when it doesn't. Counseled on importance of cardiac imaging. Now states he will "think about it."     Brief Daily Plan:    MEDICATIONS  MEDICATIONS  (STANDING):  ceFAZolin   IVPB      ceFAZolin   IVPB 2000 milliGRAM(s) IV Intermittent every 8 hours  enoxaparin Injectable 90 milliGRAM(s) SubCutaneous every 12 hours  influenza  Vaccine (HIGH DOSE) 0.7 milliLiter(s) IntraMuscular once  levoFLOXacin  Tablet 500 milliGRAM(s) Oral every 24 hours  lidocaine   4% Patch 1 Patch Transdermal daily  mupirocin 2% Ointment 1 Application(s) Both Nostrils two times a day    MEDICATIONS  (PRN):  acetaminophen     Tablet .. 650 milliGRAM(s) Oral every 6 hours PRN Temp greater or equal to 38C (100.4F), Mild Pain (1 - 3), Moderate Pain (4 - 6), Severe Pain (7 - 10)  cyclobenzaprine 10 milliGRAM(s) Oral three times a day PRN Muscle Spasm  ibuprofen  Tablet. 600 milliGRAM(s) Oral three times a day PRN Mild Pain (1 - 3), Moderate Pain (4 - 6), Severe Pain (7 - 10)  lidocaine   4% Patch 1 Patch Transdermal daily PRN pain MSK      VITALS  Vital Signs Last 24 Hrs  T(C): 36.6 (16 Oct 2022 05:00), Max: 36.8 (15 Oct 2022 13:19)  T(F): 97.8 (16 Oct 2022 05:00), Max: 98.3 (15 Oct 2022 21:00)  HR: 78 (16 Oct 2022 05:00) (72 - 78)  BP: 167/73 (16 Oct 2022 05:00) (153/82 - 167/73)  BP(mean): --  RR: 18 (16 Oct 2022 05:00) (18 - 18)  SpO2: 96% (16 Oct 2022 05:00) (96% - 99%)    Parameters below as of 16 Oct 2022 05:00  Patient On (Oxygen Delivery Method): room air        PHYSICAL EXAM:  GENERAL: no distress  PSYCH: A&O x3  HEAD: Atraumatic, Normocephalic  NECK: Supple, No JVD  CHEST/LUNG: clear to auscultation bilaterally  HEART: regular rate and rhythm, no murmurs  ABDOMEN: nontender to palpation, no rebound tenderness/guarding  EXTREMITIES: no edema on bilateral LE  NEUROLOGY: no focal neurologic deficit  SKIN: No rashes or lesions    LABS:  All labs personally Reviewed.    RADIOLOGY & ADDITIONAL TESTS:  All imaging personally Reviewed.  Kev Montgomery PGY1  pager [phone] or Teams or check resident tab for coverage    Patient is a 67y old  Male who presents with a chief complaint of shortness of breath,  fever (15 Oct 2022 10:33)    SUBJECTIVE / OVERNIGHT EVENTS:    NAEO.    Patient this morning is doing well. Denies fever, chills, pain is improving. No pain in right knee.   Because he feels well, he does not want the OMI. Says his "heart feels fine" and that he's only here for lung treatment. States he'll come back, and get OMI when it doesn't. Counseled on importance of cardiac imaging. Now states he will "think about it."     Brief Daily Plan:    MEDICATIONS  MEDICATIONS  (STANDING):  ceFAZolin   IVPB      ceFAZolin   IVPB 2000 milliGRAM(s) IV Intermittent every 8 hours  enoxaparin Injectable 90 milliGRAM(s) SubCutaneous every 12 hours  influenza  Vaccine (HIGH DOSE) 0.7 milliLiter(s) IntraMuscular once  levoFLOXacin  Tablet 500 milliGRAM(s) Oral every 24 hours  lidocaine   4% Patch 1 Patch Transdermal daily  mupirocin 2% Ointment 1 Application(s) Both Nostrils two times a day    MEDICATIONS  (PRN):  acetaminophen     Tablet .. 650 milliGRAM(s) Oral every 6 hours PRN Temp greater or equal to 38C (100.4F), Mild Pain (1 - 3), Moderate Pain (4 - 6), Severe Pain (7 - 10)  cyclobenzaprine 10 milliGRAM(s) Oral three times a day PRN Muscle Spasm  ibuprofen  Tablet. 600 milliGRAM(s) Oral three times a day PRN Mild Pain (1 - 3), Moderate Pain (4 - 6), Severe Pain (7 - 10)  lidocaine   4% Patch 1 Patch Transdermal daily PRN pain MSK      VITALS  Vital Signs Last 24 Hrs  T(C): 36.6 (16 Oct 2022 05:00), Max: 36.8 (15 Oct 2022 13:19)  T(F): 97.8 (16 Oct 2022 05:00), Max: 98.3 (15 Oct 2022 21:00)  HR: 78 (16 Oct 2022 05:00) (72 - 78)  BP: 167/73 (16 Oct 2022 05:00) (153/82 - 167/73)  BP(mean): --  RR: 18 (16 Oct 2022 05:00) (18 - 18)  SpO2: 96% (16 Oct 2022 05:00) (96% - 99%)    Parameters below as of 16 Oct 2022 05:00  Patient On (Oxygen Delivery Method): room air        PHYSICAL EXAM:  GENERAL: no distress  PSYCH: A&O x3  HEAD: Atraumatic, Normocephalic  NECK: Supple, No JVD  CHEST/LUNG: clear to auscultation bilaterally  HEART: regular rate and rhythm, no murmurs  ABDOMEN: nontender to palpation, no rebound tenderness/guarding  EXTREMITIES: no edema on bilateral LE  NEUROLOGY: no focal neurologic deficit  SKIN: No rashes or lesions    LABS:  All labs personally Reviewed.    RADIOLOGY & ADDITIONAL TESTS:  All imaging personally Reviewed.  Kev Montgomery PGY1  pager [phone] or Teams or check resident tab for coverage    Patient is a 67y old  Male who presents with a chief complaint of shortness of breath,  fever (15 Oct 2022 10:33)    SUBJECTIVE / OVERNIGHT EVENTS:    NAEO.    Patient this morning is doing well. Denies fever, chills, pain is improving. No pain in right knee.   Because he feels well, he does not want the OMI. Says his "heart feels fine" and that he's only here for lung treatment. States he'll come back, and get OMI when it doesn't. Counseled on importance of cardiac imaging. Now states he will "think about it."     Brief Daily Plan:  - c/w antibiotics  - pending MR foot  - pending OMI    MEDICATIONS  MEDICATIONS  (STANDING):  ceFAZolin   IVPB      ceFAZolin   IVPB 2000 milliGRAM(s) IV Intermittent every 8 hours  enoxaparin Injectable 90 milliGRAM(s) SubCutaneous every 12 hours  influenza  Vaccine (HIGH DOSE) 0.7 milliLiter(s) IntraMuscular once  levoFLOXacin  Tablet 500 milliGRAM(s) Oral every 24 hours  lidocaine   4% Patch 1 Patch Transdermal daily  mupirocin 2% Ointment 1 Application(s) Both Nostrils two times a day    MEDICATIONS  (PRN):  acetaminophen     Tablet .. 650 milliGRAM(s) Oral every 6 hours PRN Temp greater or equal to 38C (100.4F), Mild Pain (1 - 3), Moderate Pain (4 - 6), Severe Pain (7 - 10)  cyclobenzaprine 10 milliGRAM(s) Oral three times a day PRN Muscle Spasm  ibuprofen  Tablet. 600 milliGRAM(s) Oral three times a day PRN Mild Pain (1 - 3), Moderate Pain (4 - 6), Severe Pain (7 - 10)  lidocaine   4% Patch 1 Patch Transdermal daily PRN pain MSK    VITALS  Vital Signs Last 24 Hrs  T(C): 36.6 (16 Oct 2022 05:00), Max: 36.8 (15 Oct 2022 13:19)  T(F): 97.8 (16 Oct 2022 05:00), Max: 98.3 (15 Oct 2022 21:00)  HR: 78 (16 Oct 2022 05:00) (72 - 78)  BP: 167/73 (16 Oct 2022 05:00) (153/82 - 167/73)  BP(mean): --  RR: 18 (16 Oct 2022 05:00) (18 - 18)  SpO2: 96% (16 Oct 2022 05:00) (96% - 99%)    Parameters below as of 16 Oct 2022 05:00  Patient On (Oxygen Delivery Method): room air    PHYSICAL EXAM:  GENERAL: no distress  PSYCH: A&O x3  HEAD: Atraumatic, Normocephalic  NECK: Supple, No JVD  CHEST/LUNG: clear to auscultation bilaterally  HEART: regular rate and rhythm, no murmurs  ABDOMEN: nontender to palpation, no rebound tenderness/guarding  EXTREMITIES: redressed foot by podiatry, no drainage per podiatry  NEUROLOGY: no focal neurologic deficit  SKIN: No rashes or lesions    LABS:  All labs personally Reviewed.    RADIOLOGY & ADDITIONAL TESTS:  All imaging personally Reviewed.  Kev Montgomery PGY1  pager [phone] or Teams or check resident tab for coverage    Patient is a 67y old  Male who presents with a chief complaint of shortness of breath,  fever (15 Oct 2022 10:33)    SUBJECTIVE / OVERNIGHT EVENTS:  NAEO.    Patient this morning is doing well. Denies fever, chills, pain is improving. No pain in right knee.   Because he feels well, he does not want the OMI. Says his "heart feels fine" and that he's only here for lung treatment. States he'll come back, and get OMI when it doesn't. Counseled on importance of cardiac imaging. Now states he will "think about it."     Brief Daily Plan:  - c/w antibiotics  - pending MR foot  - pending OMI    MEDICATIONS  MEDICATIONS  (STANDING):  ceFAZolin   IVPB      ceFAZolin   IVPB 2000 milliGRAM(s) IV Intermittent every 8 hours  enoxaparin Injectable 90 milliGRAM(s) SubCutaneous every 12 hours  influenza  Vaccine (HIGH DOSE) 0.7 milliLiter(s) IntraMuscular once  levoFLOXacin  Tablet 500 milliGRAM(s) Oral every 24 hours  lidocaine   4% Patch 1 Patch Transdermal daily  mupirocin 2% Ointment 1 Application(s) Both Nostrils two times a day    MEDICATIONS  (PRN):  acetaminophen     Tablet .. 650 milliGRAM(s) Oral every 6 hours PRN Temp greater or equal to 38C (100.4F), Mild Pain (1 - 3), Moderate Pain (4 - 6), Severe Pain (7 - 10)  cyclobenzaprine 10 milliGRAM(s) Oral three times a day PRN Muscle Spasm  ibuprofen  Tablet. 600 milliGRAM(s) Oral three times a day PRN Mild Pain (1 - 3), Moderate Pain (4 - 6), Severe Pain (7 - 10)  lidocaine   4% Patch 1 Patch Transdermal daily PRN pain MSK    VITALS  Vital Signs Last 24 Hrs  T(C): 36.6 (16 Oct 2022 05:00), Max: 36.8 (15 Oct 2022 13:19)  T(F): 97.8 (16 Oct 2022 05:00), Max: 98.3 (15 Oct 2022 21:00)  HR: 78 (16 Oct 2022 05:00) (72 - 78)  BP: 167/73 (16 Oct 2022 05:00) (153/82 - 167/73)  BP(mean): --  RR: 18 (16 Oct 2022 05:00) (18 - 18)  SpO2: 96% (16 Oct 2022 05:00) (96% - 99%)    Parameters below as of 16 Oct 2022 05:00  Patient On (Oxygen Delivery Method): room air    PHYSICAL EXAM:  GENERAL: no distress  PSYCH: A&O x3  HEAD: Atraumatic, Normocephalic  NECK: Supple, No JVD  CHEST/LUNG: clear to auscultation bilaterally  HEART: regular rate and rhythm, no murmurs  ABDOMEN: nontender to palpation, no rebound tenderness/guarding  EXTREMITIES: redressed foot by podiatry, no drainage per podiatry  NEUROLOGY: no focal neurologic deficit  SKIN: No rashes or lesions    LABS:  All labs personally Reviewed.    RADIOLOGY & ADDITIONAL TESTS:  All imaging personally Reviewed.

## 2022-10-16 NOTE — PROGRESS NOTE ADULT - ASSESSMENT
67y Male with L foot submet 3 wound to capsule  - Patient seen and evaluated.  - Afebrile, WBC 10.62.  - R foot no wounds, no acute signs of infection. L foot s/p bullae deroofed on 10/12, wound at submet 3 area to dermis with tracking sinus to 3rd MPJ capsule, tracking noted distally ~1cm, mild serosanguineous drainage, no pus.   - L foot MR pending.  - L foot wound culture: MSSA (final).  - ID recs, appreciated.  - Pod plan local wound care vs left foot bone biopsy pending MRI.  - Discussed with attending.

## 2022-10-17 LAB
ANION GAP SERPL CALC-SCNC: 12 MMOL/L — SIGNIFICANT CHANGE UP (ref 7–14)
BUN SERPL-MCNC: 19 MG/DL — SIGNIFICANT CHANGE UP (ref 7–23)
C PNEUM IGM TITR SER: SIGNIFICANT CHANGE UP
C PNEUM IGM TITR SER: SIGNIFICANT CHANGE UP
CALCIUM SERPL-MCNC: 8.9 MG/DL — SIGNIFICANT CHANGE UP (ref 8.4–10.5)
CHLAMYDIA AB SER-ACNC: SIGNIFICANT CHANGE UP
CHLAMYDIA AB SER-ACNC: SIGNIFICANT CHANGE UP
CHLAMYDIA IGG SER-ACNC: SIGNIFICANT CHANGE UP
CHLAMYDIA IGG SER-ACNC: SIGNIFICANT CHANGE UP
CHLAMYDIA PNEUMONIAE IGA: SIGNIFICANT CHANGE UP
CHLAMYDIA PNEUMONIAE IGA: SIGNIFICANT CHANGE UP
CHLORIDE SERPL-SCNC: 99 MMOL/L — SIGNIFICANT CHANGE UP (ref 98–107)
CO2 SERPL-SCNC: 24 MMOL/L — SIGNIFICANT CHANGE UP (ref 22–31)
CREAT SERPL-MCNC: 0.88 MG/DL — SIGNIFICANT CHANGE UP (ref 0.5–1.3)
CULTURE RESULTS: SIGNIFICANT CHANGE UP
EGFR: 95 ML/MIN/1.73M2 — SIGNIFICANT CHANGE UP
GLUCOSE SERPL-MCNC: 110 MG/DL — HIGH (ref 70–99)
GRAM STN FLD: SIGNIFICANT CHANGE UP
HCT VFR BLD CALC: 40.5 % — SIGNIFICANT CHANGE UP (ref 39–50)
HGB BLD-MCNC: 12.9 G/DL — LOW (ref 13–17)
MAGNESIUM SERPL-MCNC: 2.3 MG/DL — SIGNIFICANT CHANGE UP (ref 1.6–2.6)
MCHC RBC-ENTMCNC: 29.8 PG — SIGNIFICANT CHANGE UP (ref 27–34)
MCHC RBC-ENTMCNC: 31.9 GM/DL — LOW (ref 32–36)
MCV RBC AUTO: 93.5 FL — SIGNIFICANT CHANGE UP (ref 80–100)
NRBC # BLD: 0 /100 WBCS — SIGNIFICANT CHANGE UP (ref 0–0)
NRBC # FLD: 0 K/UL — SIGNIFICANT CHANGE UP (ref 0–0)
PHOSPHATE SERPL-MCNC: 3.8 MG/DL — SIGNIFICANT CHANGE UP (ref 2.5–4.5)
PLATELET # BLD AUTO: 303 K/UL — SIGNIFICANT CHANGE UP (ref 150–400)
POTASSIUM SERPL-MCNC: 4.5 MMOL/L — SIGNIFICANT CHANGE UP (ref 3.5–5.3)
POTASSIUM SERPL-SCNC: 4.5 MMOL/L — SIGNIFICANT CHANGE UP (ref 3.5–5.3)
RBC # BLD: 4.33 M/UL — SIGNIFICANT CHANGE UP (ref 4.2–5.8)
RBC # FLD: 13.9 % — SIGNIFICANT CHANGE UP (ref 10.3–14.5)
SARS-COV-2 RNA SPEC QL NAA+PROBE: SIGNIFICANT CHANGE UP
SODIUM SERPL-SCNC: 135 MMOL/L — SIGNIFICANT CHANGE UP (ref 135–145)
WBC # BLD: 11.59 K/UL — HIGH (ref 3.8–10.5)
WBC # FLD AUTO: 11.59 K/UL — HIGH (ref 3.8–10.5)

## 2022-10-17 PROCEDURE — 99232 SBSQ HOSP IP/OBS MODERATE 35: CPT

## 2022-10-17 PROCEDURE — 99233 SBSQ HOSP IP/OBS HIGH 50: CPT | Mod: GC

## 2022-10-17 PROCEDURE — 99233 SBSQ HOSP IP/OBS HIGH 50: CPT

## 2022-10-17 PROCEDURE — 73720 MRI LWR EXTREMITY W/O&W/DYE: CPT | Mod: 26,LT

## 2022-10-17 RX ORDER — DILTIAZEM HCL 120 MG
60 CAPSULE, EXT RELEASE 24 HR ORAL THREE TIMES A DAY
Refills: 0 | Status: DISCONTINUED | OUTPATIENT
Start: 2022-10-17 | End: 2022-10-20

## 2022-10-17 RX ADMIN — MUPIROCIN 1 APPLICATION(S): 20 OINTMENT TOPICAL at 05:47

## 2022-10-17 RX ADMIN — ENOXAPARIN SODIUM 90 MILLIGRAM(S): 100 INJECTION SUBCUTANEOUS at 18:21

## 2022-10-17 RX ADMIN — Medication 100 MILLIGRAM(S): at 11:26

## 2022-10-17 RX ADMIN — Medication 60 MILLIGRAM(S): at 15:31

## 2022-10-17 RX ADMIN — ENOXAPARIN SODIUM 90 MILLIGRAM(S): 100 INJECTION SUBCUTANEOUS at 05:47

## 2022-10-17 RX ADMIN — MUPIROCIN 1 APPLICATION(S): 20 OINTMENT TOPICAL at 18:21

## 2022-10-17 RX ADMIN — Medication 60 MILLIGRAM(S): at 22:12

## 2022-10-17 RX ADMIN — Medication 100 MILLIGRAM(S): at 01:15

## 2022-10-17 RX ADMIN — Medication 100 MILLIGRAM(S): at 20:04

## 2022-10-17 NOTE — PROVIDER CONTACT NOTE (CRITICAL VALUE NOTIFICATION) - SITUATION
Blood culture growth  aerobic bottle: staph aureus   anaerobic bottle : gram positive cocci in culture
(+) blood culture, preliminary gare (+)Growth in anaerobic bottle: Gram Positive Cocci in Clusters
(+) blood culture, (+)Growth in aerobic bottle: Gram Positive Cocci in Clusters

## 2022-10-17 NOTE — CONSULT NOTE ADULT - ASSESSMENT
This is a 67-year-old man with a pmhx of lymphedema, previous hospitalization in 2019 for pneumonia c/b pAFib and patient self-converted who presented with fever, SOB, cough, fatigue, diarrhea found to have MSSA bacteremia. ID following and is started on antibiotics. EP consulted for Afib management.    Plan:  - Continuous telemetric monitoring  - Monitor electrolytes and replete K to 4 and Mg to 2  - TTE as above normal EF 63%   - Continue Diltiazem 60mg TID  - Continue Eliquis 5mg po BID for thromboembolic ppx, [id number] score of 0  - Appreciated ID recs  - Continue care per primary team    Alessio Conde PA-C  Patient to be staffed with attending. Please await attending addendum   This is a 67-year-old man with a pmhx of lymphedema, previous hospitalization in 2019 for pneumonia c/b pAFib and patient self-converted who presented with fever, SOB, cough, fatigue, diarrhea found to have MSSA bacteremia. ID following and is started on antibiotics. EP consulted for Afib management.    Plan:  - Continuous telemetric monitoring  - Monitor electrolytes and replete K to 4 and Mg to 2  - TTE as above normal EF 63%   - Continue Diltiazem 60mg TID  - Continue Eliquis 5mg po BID for thromboembolic ppx, [id number] score of 1  - Appreciated ID recs  - Continue care per primary team    Alessio Conde PA-C  Patient to be staffed with attending. Please await attending addendum

## 2022-10-17 NOTE — PROVIDER CONTACT NOTE (CRITICAL VALUE NOTIFICATION) - RECOMMENDATIONS
Notify provider, await final results and orders
Notify provider, await final results and orders
pending orders

## 2022-10-17 NOTE — PROGRESS NOTE ADULT - PROBLEM SELECTOR PLAN 1
Fever, cough, shortness of breath, diarrhea x 2 days. Likely 2/2 pneumonia. Now with MSSA bacteremia. Concern for possibly skin source, podiatry deroofed abscess on left erythematous toe.  - CT chest 10/11 w/ diffuse GGO c/f multifocal pneumonia  - BCx 10/10: MSSA  - BCx 10/12: Gram positive   - BCx 10/15: NGTD, pending final  - Wound Cx 10/12: Many S. aureus  - legionella negative  - f/u histoplasma urine antigen  - Chlamydia pneumonia IgG +, otherwise chlamydia labs negative  - ID, podiatry consulted, appreciate recs  - s/p CTX and azithro  - c/w cefazolin 2g IV q8 (10/12 - ) and levaquin 500 mg PO daily (10/12 - )

## 2022-10-17 NOTE — PROVIDER CONTACT NOTE (OTHER) - ACTION/TREATMENT ORDERED:
Provider aware. If sustaining above 110, alert provider.
PO cardizem given
MD made aware, no further interventions at this time. As per MD okay to hold heparin and to hold off on obtaining AM labs.

## 2022-10-17 NOTE — CONSULT NOTE ADULT - SUBJECTIVE AND OBJECTIVE BOX
Source: Chart and patient     HPI:  This is a 67-year-old man with a pmhx of lymphedema, previous hospitalization in 2019 for pneumonia c/b pAFib and patient self-converted who presented with fever, SOB, cough, fatigue, diarrhea.    Patient admitted to a dry cough which was associated with SOB. Patient stated that he tried to limit his cough due to back pain. Patient also admitted to feeling fatigue and having a fever. Patient admitted to chronic lymphedema. Patient denied HA, lightheadedness, dizziness, CP, palpitation, abdominal pain, n/v/d, hematuria, melena, hematochezia numbness and tingling.  ED course was significant for T 101.5 F, , /82, RR 15, SpO2 92% on RA. Labs were significant for WBC 9.21, Hgb 12.8, HCT 37.9, , , K 3.5, BUN 13, sCr 0.78, Phos 2.0, Mg 2.10, AST 77, ALT 50, Alk phos 78, pro-BNP 1224, and covid-19 negative but founds to be MSSA bacteremia. CXR showed new pulmonary opacities. CT Chest showed diffuse bilateral groundglass opacities with interlobular septal thickening and superimposed nonspecific scattered peripheral nodular consolidations demonstrated.  Patient was given Tylenol, azithromycin 500 mg, CTX 1g, 1L LR bolus, and started on heparin gtt for afib. US duplex of the LE was negative for DVT of the LLE. ID was consulted for MSSA bacteremia and recommended cefazolin 2 gram and 5day course of Levaquin . Podiatry was consulted for left foot submet 3 wound to capsule. ID recommended OMI which the patient refused. Podiatry noted no acute signs of infection. L foot s/p bullae deroofed on 10/12, wound at submet 3 area to dermis with tracking sinus to 3rd MPJ capsule, tracking noted distally ~1cm, mild serosanguineous drainage, no pus. Podiatry recommended left foot MR which showed soft tissue ulcer along the plantar aspect of the third toe with  surrounding cellulitis, possible septic arthritis vs osteomyelitis vs posttraumatic. EP was consulted for Afib. Patient went into Afib with RVR today at 15:00. Patient stated that he was briefly on anticoagulation (Apixaban) in 2019 which was stopped due hematuria. Discussed treatment modalities for afib including rate control and anticoagulation.      PAST MEDICAL & SURGICAL HISTORY:  Wound  (chronic wound to plantar aspect of left foot) seeds podiatry and vascular surgery      No significant past surgical history  podiatry surgery in summer 2022    MEDICATIONS  (STANDING):  ceFAZolin   IVPB 2000 milliGRAM(s) IV Intermittent every 8 hours  ceFAZolin   IVPB      diltiazem    Tablet 60 milliGRAM(s) Oral three times a day  enoxaparin Injectable 90 milliGRAM(s) SubCutaneous every 12 hours  influenza  Vaccine (HIGH DOSE) 0.7 milliLiter(s) IntraMuscular once  lidocaine   4% Patch 1 Patch Transdermal daily  mupirocin 2% Ointment 1 Application(s) Both Nostrils two times a day    MEDICATIONS  (PRN):  acetaminophen     Tablet .. 650 milliGRAM(s) Oral every 6 hours PRN Temp greater or equal to 38C (100.4F), Mild Pain (1 - 3), Moderate Pain (4 - 6), Severe Pain (7 - 10)  cyclobenzaprine 10 milliGRAM(s) Oral three times a day PRN Muscle Spasm  ibuprofen  Tablet. 600 milliGRAM(s) Oral three times a day PRN Mild Pain (1 - 3), Moderate Pain (4 - 6), Severe Pain (7 - 10)  lidocaine   4% Patch 1 Patch Transdermal daily PRN pain MSK      FAMILY HISTORY:  Father COPD and Emphysema    SOCIAL HISTORY:  LIVING SITUATION: Lives with his girlfriend   CIGARETTES: Former smoker quit 40 year ago, smoked for 10 year   ALCOHOL: Socially   ILLICIT DRUG USES: denied    REVIEW OF SYSTEMS:  CONSTITUTIONAL: No fever, weight loss, chills, shakes, or fatigue  RESPIRATORY: per HPI  CARDIOVASCULAR: per HPI  GASTROINTESTINAL: No abdominal  or epigastric pain, nausea, vomiting, hematemesis, diarrhea, constipation, melena or bright red blood.  GENITOURINARY: No dysuria, nocturia, hematuria, or urinary incontinence  NEUROLOGICAL: No headaches, memory loss, slurred speech, limb weakness, loss of strength, numbness, or tremors  MUSCULOSKELETAL: No joint pain or swelling, muscle, or extremity pain, and c/o back pain        Vital Signs Last 24 Hrs  T(C): 36.6 (17 Oct 2022 12:49), Max: 36.7 (17 Oct 2022 05:00)  T(F): 97.8 (17 Oct 2022 12:49), Max: 98.1 (17 Oct 2022 05:00)  HR: 137 (17 Oct 2022 15:30) (77 - 137)  BP: 140/84 (17 Oct 2022 15:30) (140/84 - 160/70)  BP(mean): --  RR: 18 (17 Oct 2022 15:30) (17 - 18)  SpO2: 97% (17 Oct 2022 12:49) (97% - 98%)    Parameters below as of 17 Oct 2022 12:49  Patient On (Oxygen Delivery Method): room air    PHYSICAL EXAM:  GENERAL: Well appearing, speaking in full sentence, in NAD  HEART: S1S2 irregularly irregular   PULMONARY:Decreased breath sounds, normal respiratory effort.    ABDOMEN: Bowel sounds present, soft  EXTREMITIES:  Warm, well -perfused, no pedal edema, distal pulses present  NEUROLOGICAL:AOx3    INTERPRETATION OF TELEMETRY: SR, patient went into Afib at 15:00    ECG: Afib  on 10/10/2022    I&O's Detail      LABS:                        12.9   11.59 )-----------( 303      ( 17 Oct 2022 06:15 )             40.5     10-17    135  |  99  |  19  ----------------------------<  110<H>  4.5   |  24  |  0.88    Ca    8.9      17 Oct 2022 06:15  Phos  3.8     10-17  Mg     2.30     10-17              BNP  I&O's Detail    Daily     Daily     RADIOLOGY & ADDITIONAL STUDIES:      < from: Transthoracic Echocardiogram (10.11.22 @ 18:49) >  OBSERVATIONS:  Mitral Valve: Mitral annular calcification, otherwise  normal mitral valve. Minimal mitral regurgitation.  Aortic Root: Normal aortic root.  Aortic Valve: Aortic valve leaflet morphology not well  visualized.  Left Atrium: Normal left atrium.  Left Ventricle: Endocardium not well visualized; grossly  normal left ventricularsystolic function.  Estimated LVEF  about  63% (by Hammer's method).  Right Heart: Normal right atrium. Normal right ventricular  size and function. Normal tricuspid valve. Minimal  tricuspid regurgitation. Normal pulmonic valve.  Pericardium/PleuraNormal pericardium with no pericardial  effusion.  ------------------------------------------------------------------------  CONCLUSIONS:  1. Mitral annular calcification, otherwise normal mitral  valve. Minimal mitral regurgitation.  2. Endocardium not well visualized; grossly normal left  ventricular systolic function.  Estimated LVEF about  63%  (by Hammer's method).  3. Normal right ventricular size and function.  ------------------------------------------------------------------------  Confirmed on  10/12/2022 - 07:33:17 by John Trevizo M.D.,  [facility], [facility]  ------------------------------------------------------------------------    < end of copied text >    < from: CT Chest No Cont (10.11.22 @ 03:06) >  IMPRESSION:  Diffuse bilateral groundglass opacities with interlobular septal   thickening and superimposed nonspecific scattered peripheral nodular   consolidations demonstrated. Differential considerations include   multifocal pneumonia. Interstitial lung disease is also a diagnostic   possibility..    < end of copied text >    < from: Xray Chest 2 Views PA/Lat (10.11.22 @ 00:19) >  Diffuse bilateral reticular opacities may represent multifocal pneumonia   versus more likely chronic interstitial lung disease.    < end of copied text >     Source: Chart and patient     HPI:  This is a 67-year-old man with a pmhx of lymphedema, previous hospitalization in 2019 for pneumonia c/b pAFib and patient self-converted who presented with fever, SOB, cough, fatigue, diarrhea.    Patient admitted to a dry cough which was associated with SOB. Patient stated that he tried to limit his coughing due to back pain. Patient also admitted to feeling fatigue and having a fever. Patient admitted to chronic lymphedema. Patient denied HA, lightheadedness, dizziness, CP, palpitation, abdominal pain, n/v/d, hematuria, melena, hematochezia numbness and tingling.  ED course was significant for T 101.5 F, , /82, RR 15, SpO2 92% on RA. Labs were significant for WBC 9.21, Hgb 12.8, HCT 37.9, , , K 3.5, BUN 13, sCr 0.78, Phos 2.0, Mg 2.10, AST 77, ALT 50, Alk phos 78, pro-BNP 1224, and covid-19 negative but founds to be MSSA bacteremia. CXR showed new pulmonary opacities. CT Chest showed diffuse bilateral groundglass opacities with interlobular septal thickening and superimposed nonspecific scattered peripheral nodular consolidations demonstrated.  Patient was given Tylenol, azithromycin 500 mg, CTX 1g, 1L LR bolus, and started on heparin gtt for afib. US duplex of the LE was negative for DVT of the LLE. ID was consulted for MSSA bacteremia and recommended cefazolin 2 gram and 5days course of Levaquin. Podiatry was consulted for left foot submet 3 wound to capsule. ID recommended OMI which the patient refused. Podiatry noted no acute signs of infection. L foot s/p bullae deroofed on 10/12, wound at submet 3 area to dermis with tracking sinus to 3rd MPJ capsule, tracking noted distally ~1cm, mild serosanguineous drainage, no pus. Podiatry recommended left foot MR which showed soft tissue ulcer along the plantar aspect of the third toe with  surrounding cellulitis, possible septic arthritis vs osteomyelitis vs posttraumatic. EP was consulted for Afib. Patient went into Afib with RVR today at 15:00. Patient stated that he was briefly on anticoagulation (Apixaban) in 2019 which was stopped due hematuria. Discussed treatment modalities for afib including rate control and anticoagulation.      PAST MEDICAL & SURGICAL HISTORY:  Wound  (chronic wound to plantar aspect of left foot) seeds podiatry and vascular surgery      No significant past surgical history  podiatry surgery in summer 2022    MEDICATIONS  (STANDING):  ceFAZolin   IVPB 2000 milliGRAM(s) IV Intermittent every 8 hours  ceFAZolin   IVPB      diltiazem    Tablet 60 milliGRAM(s) Oral three times a day  enoxaparin Injectable 90 milliGRAM(s) SubCutaneous every 12 hours  influenza  Vaccine (HIGH DOSE) 0.7 milliLiter(s) IntraMuscular once  lidocaine   4% Patch 1 Patch Transdermal daily  mupirocin 2% Ointment 1 Application(s) Both Nostrils two times a day    MEDICATIONS  (PRN):  acetaminophen     Tablet .. 650 milliGRAM(s) Oral every 6 hours PRN Temp greater or equal to 38C (100.4F), Mild Pain (1 - 3), Moderate Pain (4 - 6), Severe Pain (7 - 10)  cyclobenzaprine 10 milliGRAM(s) Oral three times a day PRN Muscle Spasm  ibuprofen  Tablet. 600 milliGRAM(s) Oral three times a day PRN Mild Pain (1 - 3), Moderate Pain (4 - 6), Severe Pain (7 - 10)  lidocaine   4% Patch 1 Patch Transdermal daily PRN pain MSK      FAMILY HISTORY:  Father COPD and Emphysema    SOCIAL HISTORY:  LIVING SITUATION: Lives with his girlfriend   CIGARETTES: Former smoker quit 40 year ago, smoked for 10 year   ALCOHOL: Socially   ILLICIT DRUG USES: denied    REVIEW OF SYSTEMS:  CONSTITUTIONAL: No fever, weight loss, chills, shakes, or fatigue  RESPIRATORY: per HPI  CARDIOVASCULAR: per HPI  GASTROINTESTINAL: No abdominal  or epigastric pain, nausea, vomiting, hematemesis, diarrhea, constipation, melena or bright red blood.  GENITOURINARY: No dysuria, nocturia, hematuria, or urinary incontinence  NEUROLOGICAL: No headaches, memory loss, slurred speech, limb weakness, loss of strength, numbness, or tremors  MUSCULOSKELETAL: No joint pain or swelling, muscle, or extremity pain, and c/o back pain        Vital Signs Last 24 Hrs  T(C): 36.6 (17 Oct 2022 12:49), Max: 36.7 (17 Oct 2022 05:00)  T(F): 97.8 (17 Oct 2022 12:49), Max: 98.1 (17 Oct 2022 05:00)  HR: 137 (17 Oct 2022 15:30) (77 - 137)  BP: 140/84 (17 Oct 2022 15:30) (140/84 - 160/70)  BP(mean): --  RR: 18 (17 Oct 2022 15:30) (17 - 18)  SpO2: 97% (17 Oct 2022 12:49) (97% - 98%)    Parameters below as of 17 Oct 2022 12:49  Patient On (Oxygen Delivery Method): room air    PHYSICAL EXAM:  GENERAL: Well appearing, speaking in full sentence, in NAD  HEART: S1S2 irregularly irregular   PULMONARY:Decreased breath sounds, normal respiratory effort.    ABDOMEN: Bowel sounds present, soft  EXTREMITIES:  Warm, well -perfused, no pedal edema, distal pulses present  NEUROLOGICAL:AOx3    INTERPRETATION OF TELEMETRY: SR, patient went into Afib at 15:00    ECG: Afib  on 10/10/2022    I&O's Detail      LABS:                        12.9   11.59 )-----------( 303      ( 17 Oct 2022 06:15 )             40.5     10-17    135  |  99  |  19  ----------------------------<  110<H>  4.5   |  24  |  0.88    Ca    8.9      17 Oct 2022 06:15  Phos  3.8     10-17  Mg     2.30     10-17              BNP  I&O's Detail    Daily     Daily     RADIOLOGY & ADDITIONAL STUDIES:      < from: Transthoracic Echocardiogram (10.11.22 @ 18:49) >  OBSERVATIONS:  Mitral Valve: Mitral annular calcification, otherwise  normal mitral valve. Minimal mitral regurgitation.  Aortic Root: Normal aortic root.  Aortic Valve: Aortic valve leaflet morphology not well  visualized.  Left Atrium: Normal left atrium.  Left Ventricle: Endocardium not well visualized; grossly  normal left ventricularsystolic function.  Estimated LVEF  about  63% (by Hammer's method).  Right Heart: Normal right atrium. Normal right ventricular  size and function. Normal tricuspid valve. Minimal  tricuspid regurgitation. Normal pulmonic valve.  Pericardium/PleuraNormal pericardium with no pericardial  effusion.  ------------------------------------------------------------------------  CONCLUSIONS:  1. Mitral annular calcification, otherwise normal mitral  valve. Minimal mitral regurgitation.  2. Endocardium not well visualized; grossly normal left  ventricular systolic function.  Estimated LVEF about  63%  (by Hammer's method).  3. Normal right ventricular size and function.  ------------------------------------------------------------------------  Confirmed on  10/12/2022 - 07:33:17 by John Trevizo M.D.,  [facility], [facility]  ------------------------------------------------------------------------    < end of copied text >    < from: CT Chest No Cont (10.11.22 @ 03:06) >  IMPRESSION:  Diffuse bilateral groundglass opacities with interlobular septal   thickening and superimposed nonspecific scattered peripheral nodular   consolidations demonstrated. Differential considerations include   multifocal pneumonia. Interstitial lung disease is also a diagnostic   possibility..    < end of copied text >    < from: Xray Chest 2 Views PA/Lat (10.11.22 @ 00:19) >  Diffuse bilateral reticular opacities may represent multifocal pneumonia   versus more likely chronic interstitial lung disease.    < end of copied text >

## 2022-10-17 NOTE — PROVIDER CONTACT NOTE (OTHER) - ASSESSMENT
Patient A+Ox4, denies chest pain or SOB. No acute s/s of distress noted, resting comfortably in bed
Pt a&ox4. Multiple attempts were made to obtain labs as well as an IV insertion - all were unsuccessful. IV not flushing well and red at the site.
Patient tachy up to 135, sinus tach, now sustaining 100-110. Vitals as per flowsheet. Patient denies dizziness, headache, palpitations, chest pain. Patient states he "ate too much food"

## 2022-10-17 NOTE — PROGRESS NOTE ADULT - PROBLEM SELECTOR PLAN 4
Hx of AF with spontaneous resolution previous admission also in setting of pneumonia. Pneumonia is again likely trigger.   - CHADVASC 1  - s/p heparin gtt, transitioned to lovenox therapeutic  - spontaneously converted to NSR on 10/14  - CHADVASC low, consider d/cing anticoagulation  - TTE wnl   - TSH wnl  - monitor HR  - recommend f/u with EP outpatient  - patient refusing OMI as he says "his heart is fine"

## 2022-10-17 NOTE — PROVIDER CONTACT NOTE (CRITICAL VALUE NOTIFICATION) - ASSESSMENT
vitals stable
Patient received AOx4, no complaints of chest pain, generalized pain or sob. No s/s of infection. Vital signs as per flowsheet
Patient received AOx4, no complaints of chest pain, generalized pain or sob. No s/s of infection. Vital signs as per flowsheet

## 2022-10-17 NOTE — PROVIDER CONTACT NOTE (CRITICAL VALUE NOTIFICATION) - ACTION/TREATMENT ORDERED:
Provider notified, awaiting further orders at this time,
Provider notified, awaiting further orders at this time,
pending orders

## 2022-10-17 NOTE — PROVIDER CONTACT NOTE (OTHER) - BACKGROUND
Pt admitted for fever, SOB, cough, + PNA
66yo M with hx of lymphedema. Admitted for SOB, fever, diarrhea, found to be in AFib and dx with pna
admitted with pneumonia, bacteremia

## 2022-10-17 NOTE — PROGRESS NOTE ADULT - SUBJECTIVE AND OBJECTIVE BOX
Follow Up:      Inverval History/ROS:Patient is a 67y old  Male who presents with a chief complaint of shortness of breath, fever (17 Oct 2022 07:21)    No fever  No events    Allergies    No Known Allergies    Intolerances        ANTIMICROBIALS:  ceFAZolin   IVPB 2000 every 8 hours  ceFAZolin   IVPB    levoFLOXacin  Tablet 500 every 24 hours      OTHER MEDS:  acetaminophen     Tablet .. 650 milliGRAM(s) Oral every 6 hours PRN  cyclobenzaprine 10 milliGRAM(s) Oral three times a day PRN  diltiazem    Tablet 60 milliGRAM(s) Oral three times a day  enoxaparin Injectable 90 milliGRAM(s) SubCutaneous every 12 hours  ibuprofen  Tablet. 600 milliGRAM(s) Oral three times a day PRN  influenza  Vaccine (HIGH DOSE) 0.7 milliLiter(s) IntraMuscular once  lidocaine   4% Patch 1 Patch Transdermal daily PRN  lidocaine   4% Patch 1 Patch Transdermal daily  mupirocin 2% Ointment 1 Application(s) Both Nostrils two times a day      Vital Signs Last 24 Hrs  T(C): 36.6 (17 Oct 2022 12:49), Max: 36.7 (17 Oct 2022 05:00)  T(F): 97.8 (17 Oct 2022 12:49), Max: 98.1 (17 Oct 2022 05:00)  HR: 137 (17 Oct 2022 15:30) (77 - 137)  BP: 140/84 (17 Oct 2022 15:30) (140/84 - 160/70)  BP(mean): --  RR: 18 (17 Oct 2022 15:30) (17 - 18)  SpO2: 97% (17 Oct 2022 12:49) (97% - 98%)    Parameters below as of 17 Oct 2022 12:49  Patient On (Oxygen Delivery Method): room air        PHYSICAL EXAM:  General: [ x] non-toxic  HEAD/EYES: [ ] PERRL [x ] white sclera [ ] icterus  ENT:  [ ] normal [x ] supple [ ] thrush [ ] pharyngeal exudate  Cardiovascular:   [ ] murmur [x ] normal [ ] PPM/AICD  Respiratory:  [x ] clear to ausculation bilaterally  GI:  [x ] soft, non-tender, normal bowel sounds  :  [ ] vasquez [ ] no CVA tenderness   Musculoskeletal:  [ ] no synovitis  Neurologic:  [ ] non-focal exam   Skin:  [x ] no rash  Lymph: [ ] no lymphadenopathy  Psychiatric:  [ ] appropriate affect [ x] alert & oriented  Lines:  [x ] no phlebitis [ ] central line                                12.9   11.59 )-----------( 303      ( 17 Oct 2022 06:15 )             40.5       10-17    135  |  99  |  19  ----------------------------<  110<H>  4.5   |  24  |  0.88    Ca    8.9      17 Oct 2022 06:15  Phos  3.8     10-17  Mg     2.30     10-17            MICROBIOLOGY:Culture Results:   No growth to date. (10-15-22 @ 05:40)  Culture Results:   No growth to date. (10-15-22 @ 05:22)  Culture Results:   Numerous Staphylococcus aureus (10-12-22 @ 17:10)  Culture Results:   Growth in aerobic bottle: Staphylococcus aureus  See previous culture [id number] (10-12-22 @ 12:15)  Culture Results:   Growth in aerobic and anaerobic bottles: Staphylococcus aureus  See previous culture [id number] (10-12-22 @ 11:00)  Culture Results:   <10,000 CFU/mL Normal Urogenital Chikis (10-10-22 @ 23:55)  Culture Results:   Growth in aerobic and anaerobic bottles: Staphylococcus aureus  ***Blood Panel PCR results on this specimen are available  approximately 3 hours after the Gram stain result.***  Gram stain, PCR, and/or culture results may not always  correspond dueto difference in methodologies.  ************************************************************  This PCR assay was performed by multiplex PCR. This  Assay tests for 66 bacterial and resistance gene targets.  Please refer to the [facility] test directory  at [email or web address] for details. (10-10-22 @ 23:50)  Culture Results:   Growth in aerobic and anaerobic bottles: Staphylococcus aureus  See previous culture [id number] (10-10-22 @ 23:35)      RADIOLOGY:  < from: MR Foot w/wo IV Cont, Left (10.17.22 @ 10:34) >  Impression:    Soft tissue ulcer along the plantar aspectof the third toe with   surrounding cellulitis. No drainable fluid collection.    Chronic dorsal subluxation/dislocation of the second proximal phalanx and   at the level of the second metatarsophalangeal joint with chronic   fracturing of the secondmetatarsal head/neck. Osseous edema with areas   of T1 marrow signal abnormality within the base of the second proximal   phalanx and within the fragmented second metatarsal head, which may be   posttraumatic or be related to septic arthritis/osteomyelitis.    Osseous edema with areas of minimal T1 marrow signal abnormality within   the first and fourth distal phalanges, which may be related artifact or   be related to early osteomyelitis. Recommend correlation for overlying   ulcer.    < end of copied text >

## 2022-10-17 NOTE — PROVIDER CONTACT NOTE (CRITICAL VALUE NOTIFICATION) - NS PROVIDER READ BACK
(+) blood culture, (+)Growth in aerobic bottle: Gram Positive Cocci in Clusters/yes
(+) blood culture, preliminary gare (+)Growth in anaerobic bottle: Gram Positive Cocci in Clusters/yes
yes

## 2022-10-17 NOTE — PROGRESS NOTE ADULT - ASSESSMENT
Javier Washington 67M history of lymphedema (podiatry, vascular surgery outpatient), previous admission for pneumonia in 2019 c/b pAF with spontaneous resolution to NSR not discharged on AC, p/w SOB, fever, diarrhea x 2 days. ED with GGOs, elevated procal likely pneumonia, started on ceftriaxone/azithromycin. BCx with MSSA bacteremia. Transitioned to ancef and Levaquin per ID. Podiatry consulted for left foot abscess. Additionally with AF, asymptomatic, on lovenox, therapeutic.

## 2022-10-17 NOTE — PROGRESS NOTE ADULT - SUBJECTIVE AND OBJECTIVE BOX
Patient is a 67y old  Male who presents with a chief complaint of shortness of breath, fever (16 Oct 2022 08:39)     INTERVAL HPI/OVERNIGHT EVENTS:  - No acute events overnight    SUBJECTIVE  - Patient seen and evaluated at bedside  - Patient reports presence of   - Patient reports absence of fevers, chills, HA, lightheadedness, dizziness, nausea, emesis, chest pain, dyspnea, palpitations, abd pain, diarrhea, urinary symptoms, skin color changes or rashes, or LE edema     MEDICATIONS  (STANDING):  ceFAZolin   IVPB 2000 milliGRAM(s) IV Intermittent every 8 hours  ceFAZolin   IVPB      enoxaparin Injectable 90 milliGRAM(s) SubCutaneous every 12 hours  influenza  Vaccine (HIGH DOSE) 0.7 milliLiter(s) IntraMuscular once  levoFLOXacin  Tablet 500 milliGRAM(s) Oral every 24 hours  lidocaine   4% Patch 1 Patch Transdermal daily  mupirocin 2% Ointment 1 Application(s) Both Nostrils two times a day    MEDICATIONS  (PRN):  acetaminophen     Tablet .. 650 milliGRAM(s) Oral every 6 hours PRN Temp greater or equal to 38C (100.4F), Mild Pain (1 - 3), Moderate Pain (4 - 6), Severe Pain (7 - 10)  cyclobenzaprine 10 milliGRAM(s) Oral three times a day PRN Muscle Spasm  ibuprofen  Tablet. 600 milliGRAM(s) Oral three times a day PRN Mild Pain (1 - 3), Moderate Pain (4 - 6), Severe Pain (7 - 10)  lidocaine   4% Patch 1 Patch Transdermal daily PRN pain MSK    Allergies:  No Known Allergies    Intolerances:      REVIEW OF SYSTEMS: As indicated above; otherwise, negative    VITAL SIGNS:  T(F): 98.1 (10-17-22 @ 05:00), Max: 98.2 (10-16-22 @ 12:58)  HR: 77 (10-17-22 @ 05:00) (75 - 80)  BP: 160/70 (10-17-22 @ 05:00) (147/66 - 160/70)  RR: 18 (10-17-22 @ 05:00) (17 - 18)  SpO2: 98% (10-17-22 @ 05:00) (97% - 98%)    PHYSICAL EXAM:  General: NAD, well-groomed, well-developed  Eyes: Conjunctiva and sclera clear  ENMT: Moist mucous membranes  Neck: No palpable pre-auricular, post-auricular, occipital, mandibular, submental, supra-clavicular, or infra-clavicular lymph nodes   Chest: Clear to auscultation bilaterally; no rales, rhonchi, or wheezing  Heart: Regular rate and rhythm; normal S1 and S2; no murmurs, rubs, or gallops  Abd: Soft, nontender, nondistended  Nervous System: AAOX3  Psych: Appropriate affect  Ext: no peripheral LE edema bilaterally    LABS:                        12.9   11.59 )-----------( 303      ( 17 Oct 2022 06:15 )             40.5     17 Oct 2022 06:15    135    |  99     |  19     ----------------------------<  110    4.5     |  24     |  0.88     Ca    8.9        17 Oct 2022 06:15  Phos  3.8       17 Oct 2022 06:15  Mg     2.30      17 Oct 2022 06:15      CAPILLARY BLOOD GLUCOSE        BLOOD CULTURE  10-15 @ 05:40   No growth to date.  --  --  10-15 @ 05:22   No growth to date.  --  --    RADIOLOGY & ADDITIONAL TESTS:    Imaging Personally Reviewed:  [X ] YES     Consultant(s) Notes Reviewed:  Yes    Care Discussed with Consultants/Other Providers: Yes Patient is a 67y old  Male who presents with a chief complaint of shortness of breath, fever (16 Oct 2022 08:39)     INTERVAL HPI/OVERNIGHT EVENTS:  - No acute events overnight    SUBJECTIVE  - Patient seen and evaluated at bedside. Patient reports presence of slight neck pain, which is significantly improved from days prior. Patient states that he barely feels it. Patient reports absence of fevers, chills, headache, lightheadedness, dizziness, nausea, emesis, chest pain, dyspnea, palpitations, abdominal pain, diarrhea, urinary symptoms, skin color changes or rashes, or LE edema     MEDICATIONS  (STANDING):  ceFAZolin   IVPB 2000 milliGRAM(s) IV Intermittent every 8 hours  ceFAZolin   IVPB      enoxaparin Injectable 90 milliGRAM(s) SubCutaneous every 12 hours  influenza  Vaccine (HIGH DOSE) 0.7 milliLiter(s) IntraMuscular once  levoFLOXacin  Tablet 500 milliGRAM(s) Oral every 24 hours  lidocaine   4% Patch 1 Patch Transdermal daily  mupirocin 2% Ointment 1 Application(s) Both Nostrils two times a day    MEDICATIONS  (PRN):  acetaminophen     Tablet .. 650 milliGRAM(s) Oral every 6 hours PRN Temp greater or equal to 38C (100.4F), Mild Pain (1 - 3), Moderate Pain (4 - 6), Severe Pain (7 - 10)  cyclobenzaprine 10 milliGRAM(s) Oral three times a day PRN Muscle Spasm  ibuprofen  Tablet. 600 milliGRAM(s) Oral three times a day PRN Mild Pain (1 - 3), Moderate Pain (4 - 6), Severe Pain (7 - 10)  lidocaine   4% Patch 1 Patch Transdermal daily PRN pain MSK    Allergies:  No Known Allergies    Intolerances:      REVIEW OF SYSTEMS: As indicated above; otherwise, negative    VITAL SIGNS:  T(F): 98.1 (10-17-22 @ 05:00), Max: 98.2 (10-16-22 @ 12:58)  HR: 77 (10-17-22 @ 05:00) (75 - 80)  BP: 160/70 (10-17-22 @ 05:00) (147/66 - 160/70)  RR: 18 (10-17-22 @ 05:00) (17 - 18)  SpO2: 98% (10-17-22 @ 05:00) (97% - 98%)    PHYSICAL EXAM:  General: NAD, well-groomed, well-developed  Eyes: Pupils equal, round, reactive to light, extraocular muscles intact  ENMT: Moist mucous membranes  Neck: no thyromegaly, no JVD, no lymphadenopathy   Chest: Clear to auscultation bilaterally; no rales, rhonchi, or wheezing  Heart: Regular rate and rhythm; normal S1 and S2; no murmurs, rubs, or gallops  Abdomen: Soft, nontender, nondistended, normoactive bowel sounds   Extremity: Left lower foot under dressing. Right knee non-tender  Nervous System: AOx3, no focal neurological deficits       LABS:                        12.9   11.59 )-----------( 303      ( 17 Oct 2022 06:15 )             40.5     17 Oct 2022 06:15    135    |  99     |  19     ----------------------------<  110    4.5     |  24     |  0.88     Ca    8.9        17 Oct 2022 06:15  Phos  3.8       17 Oct 2022 06:15  Mg     2.30      17 Oct 2022 06:15      CAPILLARY BLOOD GLUCOSE        BLOOD CULTURE  10-15 @ 05:40   No growth to date.  --  --  10-15 @ 05:22   No growth to date.  --  --    RADIOLOGY & ADDITIONAL TESTS:    Imaging Personally Reviewed:  [X ] YES     Consultant(s) Notes Reviewed:  Yes    Care Discussed with Consultants/Other Providers: Yes

## 2022-10-17 NOTE — PROVIDER CONTACT NOTE (CRITICAL VALUE NOTIFICATION) - TEST AND RESULT REPORTED:
(+) blood culture
Blood culture growth  aerobic bottle: staph aureus   anaerobic bottle : gram positive cocci in culture
(+) blood culture

## 2022-10-17 NOTE — PROVIDER CONTACT NOTE (OTHER) - SITUATION
Patient tachy up to 135
unable to obtain AM labs; insert an IV
patient tachy to 135 on telemetry monitor, converted into AFib

## 2022-10-17 NOTE — PROVIDER CONTACT NOTE (CRITICAL VALUE NOTIFICATION) - BACKGROUND
67 yo with PMH of lymphedema, presented to ED with fever, SOB, cough, fatigue, diarrhea.
Pt currently taking cefazolin
67 yo with PMH of lymphedema, presented to ED with fever, SOB, cough, fatigue, diarrhea.

## 2022-10-17 NOTE — PROGRESS NOTE ADULT - ASSESSMENT
67 year old with history of left foot infection presents with fever, cough and shortness of breath.  Found to have new pulmonary opacities and MSSA bacteremia.     1) MSSA bacteremia  cefazolin 2 gram iv q 8  Blood culture from 10/15 without growth    TTE limited- with positive repeat blood cultures on 10/12- I would check a OMI    Will glen 4 week iv antibiotics from date of clearance at a minimum    2) Left third toe erythema  Podiatry evaluation- s/p I and D  Follow up wound culture- growing staph aureus    I spoke with podiatry- they do not think the changes seen on imaging of the first and fourth toe correlate with clinical exam. Tjhey plan to follow this as an outpt.     They think the soft tissue infection is the only acute infection at this time.       3) Abnormal chest imaging  Cough with changes on chest imaging  S/p 5 days of levaquin  can stop levaquin    Check urine histoplasmosis  Psittacosis serology-IG G +, but IgM negative- not suggestive of acute infection  Will need follow up imaging with pmd    I will be away 10/18 through 10/23  ID service to follow   call [phone] with questions   67 year old with history of left foot infection presents with fever, cough and shortness of breath.  Found to have new pulmonary opacities and MSSA bacteremia.     1) MSSA bacteremia  cefazolin 2 gram iv q 8  Blood culture from 10/15 without growth    TTE limited- with positive repeat blood cultures on 10/12- I would check a OMI    Will glen 4 week iv antibiotics from date of clearance at a minimum    2) Left third toe erythema  Podiatry evaluation- s/p I and D  Follow up wound culture- growing staph aureus    I spoke with podiatry- they do not think the changes seen on imaging of the first and fourth toe correlate with clinical exam. Tjhey plan to follow this as an outpt.     They think the soft tissue infection is the only acute infection at this time.       3) Abnormal chest imaging  Cough with changes on chest imaging  S/p 5 days of levaquin  can stop levaquin    Check urine histoplasmosis  Psittacosis serology-IG G +, but IgM negative- not suggestive of acute infection  Will need follow up imaging with pmd    Addendum,     If pt refuses OMI, plan on cefazolin 2 gram iv q 8 through 11/12/2022  Weekly cbc, bmp fax to [phone]  Repeat TTE with his pmd prior to end of antibiotic course    Pt states he will refuses IV antibiotics in "2 more days" . I explained that this would be against my medical advice, would increase likelihood of treatment failure, and increase likelihood of adverse effects including spinal infection, endocarditis, and recurrent sepsis.     D/w Medicine    I will be away 10/18 through 10/23  ID service to follow   call [phone] with questions

## 2022-10-17 NOTE — PROVIDER CONTACT NOTE (OTHER) - REASON
Patient tachy up to 135
patient tachy to 135 on telemetry monitor, converted into AFib
unable to obtain AM labs; insert an IV

## 2022-10-17 NOTE — CONSULT NOTE ADULT - NS ATTEND AMEND GEN_ALL_CORE FT
67-year-old man with a pmhx of lymphedema, previous hospitalization in 2019 for pneumonia c/b pAFib and patient self-converted who presented with fever, SOB, cough, fatigue, diarrhea found to have MSSA bacteremia. ID following and is started on antibiotics. EP consulted for Afib management. Cont rate control and AC. FU as outpt to discuss further management including ablation.

## 2022-10-17 NOTE — PROGRESS NOTE ADULT - PROBLEM SELECTOR PLAN 3
History of lymphedema treated by podiatry and [facility] vascular surgery. Patient notes redness of toe comes and goes, with swelling and warmth of toe 3 days prior to admission that is resolving.  - appreciate podiatry recs, possible source of infection given MSSA bacteremia  - c/w cefazolin, levaquin per ID  - Wound Cx: many S. aureus  - MRI L foot shows "osseous edema with areas of T1 marrow signal abnormality within the base of the second proximal phalanx and within the fragmented second metatarsal head, which may be posttraumatic or be related to septic arthritis/osteomyelitis"

## 2022-10-18 ENCOUNTER — TRANSCRIPTION ENCOUNTER (OUTPATIENT)
Age: 67
End: 2022-10-18

## 2022-10-18 PROBLEM — T14.90XA INJURY, UNSPECIFIED, INITIAL ENCOUNTER: Chronic | Status: ACTIVE | Noted: 2019-09-20

## 2022-10-18 LAB
ANION GAP SERPL CALC-SCNC: 11 MMOL/L — SIGNIFICANT CHANGE UP (ref 7–14)
BASOPHILS # BLD AUTO: 0 K/UL — SIGNIFICANT CHANGE UP (ref 0–0.2)
BASOPHILS NFR BLD AUTO: 0 % — SIGNIFICANT CHANGE UP (ref 0–2)
BUN SERPL-MCNC: 21 MG/DL — SIGNIFICANT CHANGE UP (ref 7–23)
CALCIUM SERPL-MCNC: 9.1 MG/DL — SIGNIFICANT CHANGE UP (ref 8.4–10.5)
CHLORIDE SERPL-SCNC: 100 MMOL/L — SIGNIFICANT CHANGE UP (ref 98–107)
CO2 SERPL-SCNC: 26 MMOL/L — SIGNIFICANT CHANGE UP (ref 22–31)
CREAT SERPL-MCNC: 0.89 MG/DL — SIGNIFICANT CHANGE UP (ref 0.5–1.3)
CULTURE RESULTS: SIGNIFICANT CHANGE UP
CULTURE RESULTS: SIGNIFICANT CHANGE UP
EGFR: 95 ML/MIN/1.73M2 — SIGNIFICANT CHANGE UP
EOSINOPHIL # BLD AUTO: 0.09 K/UL — SIGNIFICANT CHANGE UP (ref 0–0.5)
EOSINOPHIL NFR BLD AUTO: 0.9 % — SIGNIFICANT CHANGE UP (ref 0–6)
GLUCOSE SERPL-MCNC: 107 MG/DL — HIGH (ref 70–99)
HCT VFR BLD CALC: 40.2 % — SIGNIFICANT CHANGE UP (ref 39–50)
HGB BLD-MCNC: 12.8 G/DL — LOW (ref 13–17)
IANC: 6.31 K/UL — SIGNIFICANT CHANGE UP (ref 1.8–7.4)
LYMPHOCYTES # BLD AUTO: 1.32 K/UL — SIGNIFICANT CHANGE UP (ref 1–3.3)
LYMPHOCYTES # BLD AUTO: 13.3 % — SIGNIFICANT CHANGE UP (ref 13–44)
MAGNESIUM SERPL-MCNC: 2.5 MG/DL — SIGNIFICANT CHANGE UP (ref 1.6–2.6)
MCHC RBC-ENTMCNC: 30 PG — SIGNIFICANT CHANGE UP (ref 27–34)
MCHC RBC-ENTMCNC: 31.8 GM/DL — LOW (ref 32–36)
MCV RBC AUTO: 94.4 FL — SIGNIFICANT CHANGE UP (ref 80–100)
MONOCYTES # BLD AUTO: 0.87 K/UL — SIGNIFICANT CHANGE UP (ref 0–0.9)
MONOCYTES NFR BLD AUTO: 8.8 % — SIGNIFICANT CHANGE UP (ref 2–14)
NEUTROPHILS # BLD AUTO: 7.36 K/UL — SIGNIFICANT CHANGE UP (ref 1.8–7.4)
NEUTROPHILS NFR BLD AUTO: 74.3 % — SIGNIFICANT CHANGE UP (ref 43–77)
ORGANISM # SPEC MICROSCOPIC CNT: SIGNIFICANT CHANGE UP
ORGANISM # SPEC MICROSCOPIC CNT: SIGNIFICANT CHANGE UP
PHOSPHATE SERPL-MCNC: 3.7 MG/DL — SIGNIFICANT CHANGE UP (ref 2.5–4.5)
PLATELET # BLD AUTO: 282 K/UL — SIGNIFICANT CHANGE UP (ref 150–400)
POTASSIUM SERPL-MCNC: 4.6 MMOL/L — SIGNIFICANT CHANGE UP (ref 3.5–5.3)
POTASSIUM SERPL-SCNC: 4.6 MMOL/L — SIGNIFICANT CHANGE UP (ref 3.5–5.3)
RBC # BLD: 4.26 M/UL — SIGNIFICANT CHANGE UP (ref 4.2–5.8)
RBC # FLD: 14.1 % — SIGNIFICANT CHANGE UP (ref 10.3–14.5)
SODIUM SERPL-SCNC: 137 MMOL/L — SIGNIFICANT CHANGE UP (ref 135–145)
SPECIMEN SOURCE: SIGNIFICANT CHANGE UP
WBC # BLD: 9.9 K/UL — SIGNIFICANT CHANGE UP (ref 3.8–10.5)
WBC # FLD AUTO: 9.9 K/UL — SIGNIFICANT CHANGE UP (ref 3.8–10.5)

## 2022-10-18 PROCEDURE — 99232 SBSQ HOSP IP/OBS MODERATE 35: CPT

## 2022-10-18 PROCEDURE — 99233 SBSQ HOSP IP/OBS HIGH 50: CPT | Mod: GC

## 2022-10-18 RX ORDER — DILTIAZEM HCL 120 MG
1 CAPSULE, EXT RELEASE 24 HR ORAL
Qty: 42 | Refills: 0
Start: 2022-10-18 | End: 2022-10-31

## 2022-10-18 RX ORDER — CEFAZOLIN SODIUM 1 G
2 VIAL (EA) INJECTION
Qty: 156 | Refills: 0
Start: 2022-10-18 | End: 2022-11-12

## 2022-10-18 RX ORDER — APIXABAN 2.5 MG/1
1 TABLET, FILM COATED ORAL
Qty: 28 | Refills: 0
Start: 2022-10-18 | End: 2022-10-31

## 2022-10-18 RX ADMIN — MUPIROCIN 1 APPLICATION(S): 20 OINTMENT TOPICAL at 05:37

## 2022-10-18 RX ADMIN — Medication 60 MILLIGRAM(S): at 22:09

## 2022-10-18 RX ADMIN — Medication 100 MILLIGRAM(S): at 03:21

## 2022-10-18 RX ADMIN — ENOXAPARIN SODIUM 90 MILLIGRAM(S): 100 INJECTION SUBCUTANEOUS at 17:18

## 2022-10-18 RX ADMIN — MUPIROCIN 1 APPLICATION(S): 20 OINTMENT TOPICAL at 17:19

## 2022-10-18 RX ADMIN — Medication 100 MILLIGRAM(S): at 19:47

## 2022-10-18 RX ADMIN — CYCLOBENZAPRINE HYDROCHLORIDE 10 MILLIGRAM(S): 10 TABLET, FILM COATED ORAL at 05:38

## 2022-10-18 RX ADMIN — Medication 60 MILLIGRAM(S): at 05:40

## 2022-10-18 RX ADMIN — Medication 100 MILLIGRAM(S): at 11:10

## 2022-10-18 RX ADMIN — ENOXAPARIN SODIUM 90 MILLIGRAM(S): 100 INJECTION SUBCUTANEOUS at 05:37

## 2022-10-18 RX ADMIN — Medication 60 MILLIGRAM(S): at 14:40

## 2022-10-18 NOTE — DISCHARGE NOTE NURSING/CASE MANAGEMENT/SOCIAL WORK - NSDCPEFALRISK_GEN_ALL_CORE
For information on Fall & Injury Prevention, visit: [email or web address] OR  [email or web address] OR  https://www.cdc.gov/steadi/patient.html

## 2022-10-18 NOTE — DIETITIAN INITIAL EVALUATION ADULT - PERTINENT LABORATORY DATA
10-18    137  |  100  |  21  ----------------------------<  107<H>  4.6   |  26  |  0.89    Ca    9.1      18 Oct 2022 06:01  Phos  3.7     10-18  Mg     2.50     10-18    A1C with Estimated Average Glucose Result: 5.8 % (10-15-22 @ 05:53)

## 2022-10-18 NOTE — PROGRESS NOTE ADULT - SUBJECTIVE AND OBJECTIVE BOX
CHICO WICK 67y [id number]    Patient is a 67y old  Male who presents with a chief complaint of shortness of breath, fever (18 Oct 2022 10:09)      Follow Up/CC:  ID following for bacteremia    Interval History/ROS: no fever    Allergies    No Known Allergies    Intolerances        ANTIMICROBIALS:  ceFAZolin   IVPB 2000 every 8 hours  ceFAZolin   IVPB        MEDICATIONS  (STANDING):  ceFAZolin   IVPB 2000 milliGRAM(s) IV Intermittent every 8 hours  ceFAZolin   IVPB      diltiazem    Tablet 60 milliGRAM(s) Oral three times a day  enoxaparin Injectable 90 milliGRAM(s) SubCutaneous every 12 hours  influenza  Vaccine (HIGH DOSE) 0.7 milliLiter(s) IntraMuscular once  lidocaine   4% Patch 1 Patch Transdermal daily  mupirocin 2% Ointment 1 Application(s) Both Nostrils two times a day    MEDICATIONS  (PRN):  acetaminophen     Tablet .. 650 milliGRAM(s) Oral every 6 hours PRN Temp greater or equal to 38C (100.4F), Mild Pain (1 - 3), Moderate Pain (4 - 6), Severe Pain (7 - 10)  cyclobenzaprine 10 milliGRAM(s) Oral three times a day PRN Muscle Spasm  ibuprofen  Tablet. 600 milliGRAM(s) Oral three times a day PRN Mild Pain (1 - 3), Moderate Pain (4 - 6), Severe Pain (7 - 10)  lidocaine   4% Patch 1 Patch Transdermal daily PRN pain MSK        Vital Signs Last 24 Hrs  T(C): 36.4 (18 Oct 2022 12:11), Max: 36.6 (17 Oct 2022 21:30)  T(F): 97.6 (18 Oct 2022 12:11), Max: 97.8 (17 Oct 2022 21:30)  HR: 86 (18 Oct 2022 14:30) (73 - 93)  BP: 117/80 (18 Oct 2022 14:30) (115/68 - 140/84)  BP(mean): --  RR: 18 (18 Oct 2022 12:11) (18 - 18)  SpO2: 100% (18 Oct 2022 12:11) (100% - 100%)    Parameters below as of 18 Oct 2022 12:11  Patient On (Oxygen Delivery Method): room air        CBC Full  -  ( 18 Oct 2022 06:01 )  WBC Count : 9.90 K/uL  RBC Count : 4.26 M/uL  Hemoglobin : 12.8 g/dL  Hematocrit : 40.2 %  Platelet Count - Automated : 282 K/uL  Mean Cell Volume : 94.4 fL  Mean Cell Hemoglobin : 30.0 pg  Mean Cell Hemoglobin Concentration : 31.8 gm/dL  Auto Neutrophil # : 7.36 K/uL  Auto Lymphocyte # : 1.32 K/uL  Auto Monocyte # : 0.87 K/uL  Auto Eosinophil # : 0.09 K/uL  Auto Basophil # : 0.00 K/uL  Auto Neutrophil % : 74.3 %  Auto Lymphocyte % : 13.3 %  Auto Monocyte % : 8.8 %  Auto Eosinophil % : 0.9 %  Auto Basophil % : 0.0 %    10-18    137  |  100  |  21  ----------------------------<  107<H>  4.6   |  26  |  0.89    Ca    9.1      18 Oct 2022 06:01  Phos  3.7     10-18  Mg     2.50     10-18            MICROBIOLOGY:  .Blood Blood-Peripheral  10-15-22   No growth to date.  --  --      .Blood Blood-Peripheral  10-15-22   No growth to date.  --  --      .Abscess left foot wound  10-12-22   Numerous Staphylococcus aureus  --  Staphylococcus aureus      .Blood Blood-Peripheral  10-12-22   Growth in aerobic and anaerobic bottles: Staphylococcus aureus  See previous culture [id number]  --    Growth in aerobic bottle: Gram Positive Cocci in Clusters  Growth in anaerobic bottle: Gram Positive Cocci in Clusters      .Blood Blood-Peripheral  10-12-22   Growth in aerobic and anaerobic bottles: Staphylococcus aureus  See previous culture [id number]  --    Growth in anaerobic bottle: Gram Positive Cocci in Clusters  Growth in aerobic bottle: Gram Positive Cocci in Clusters      Clean Catch Clean Catch (Midstream)  10-10-22   <10,000 CFU/mL Normal Urogenital Chikis  --  --      .Blood Blood-Venous  10-10-22   Growth in aerobic and anaerobic bottles: Staphylococcus aureus  ***Blood Panel PCR results on this specimen are available  approximately 3 hours after the Gram stain result.***  Gram stain, PCR, and/or culture results may not always  correspond dueto difference in methodologies.  ************************************************************  This PCR assay was performed by multiplex PCR. This  Assay tests for 66 bacterial and resistance gene targets.  Please refer to the [facility] test directory  at [email or web address] for details.  --  Blood Culture PCR  Staphylococcus aureus      .Blood Blood-Venous  10-10-22   Growth in aerobic and anaerobic bottles: Staphylococcus aureus  See previous culture [id number]  --    Growth in aerobic bottle: Gram Positive Cocci in Clusters  Growth in anaerobic bottle: Gram Positive Cocci in Clusters        RADIOLOGY    < from: MR Foot w/wo IV Cont, Left (10.17.22 @ 10:34) >  Soft tissue ulcer along the plantar aspectof the third toe with   surrounding cellulitis. No drainable fluid collection.    Chronic dorsal subluxation/dislocation of the second proximal phalanx and   at the level of the second metatarsophalangeal joint with chronic   fracturing of the secondmetatarsal head/neck. Osseous edema with areas   of T1 marrow signal abnormality within the base of the second proximal   phalanx and within the fragmented second metatarsal head, which may be   posttraumatic or be related to septic arthritis/osteomyelitis.    Osseous edema with areas of minimal T1 marrow signal abnormality within   the first and fourth distal phalanges, which may be related artifact or   be related to early osteomyelitis. Recommend correlation for overlying   ulcer.    < end of copied text >

## 2022-10-18 NOTE — PROGRESS NOTE ADULT - SUBJECTIVE AND OBJECTIVE BOX
Subjective: Patient states that he feels better today but admits to chely tired this morning. Denies HA, lightheadedness, dizziness, CP, palpitation, and SOB.    Interval events:  - Presented with fever, SOB, dry cough, fatigue, and diarrhea and patient was found to be  MSSA bacteremia. CXR showed new pulmonary opacities. CT Chest showed diffuse bilateral groundglass opacities with interlobular septal thickening and superimposed nonspecific scattered peripheral nodular consolidations demonstrated.  Patient was given Tylenol, azithromycin 500 mg, CTX 1g, 1L LR bolus, and started on heparin gtt for afib.   - US duplex of the LE was negative for DVT of the LLE.   - ID was consulted for MSSA bacteremia and recommended cefazolin 2 gram and 5days course of Levaquin.  ID recommended OMI which the patient refused.   - Podiatry was consulted for left foot submet 3 wound to capsule.Podiatry noted no acute signs of infection. L foot s/p bullae deroofed on 10/12, wound at submet 3 area to dermis with tracking sinus to 3rd MPJ capsule, tracking noted distally ~1cm, mild serosanguineous drainage, no pus. Podiatry recommended left foot MR which showed soft tissue ulcer along the plantar aspect of the third toe with  surrounding cellulitis, possible septic arthritis vs osteomyelitis vs posttraumatic.  - EP was consulted for Afib. Patient went into Afib with RVR today at 15:00. Patient stated that he was briefly on anticoagulation (Apixaban) in 2019 which was stopped due hematuria. Discussed treatment modalities for afib including rate control and anticoagulation.        MEDICATIONS  (STANDING):  ceFAZolin   IVPB 2000 milliGRAM(s) IV Intermittent every 8 hours  ceFAZolin   IVPB      diltiazem    Tablet 60 milliGRAM(s) Oral three times a day  enoxaparin Injectable 90 milliGRAM(s) SubCutaneous every 12 hours  influenza  Vaccine (HIGH DOSE) 0.7 milliLiter(s) IntraMuscular once  lidocaine   4% Patch 1 Patch Transdermal daily  mupirocin 2% Ointment 1 Application(s) Both Nostrils two times a day    MEDICATIONS  (PRN):  acetaminophen     Tablet .. 650 milliGRAM(s) Oral every 6 hours PRN Temp greater or equal to 38C (100.4F), Mild Pain (1 - 3), Moderate Pain (4 - 6), Severe Pain (7 - 10)  cyclobenzaprine 10 milliGRAM(s) Oral three times a day PRN Muscle Spasm  ibuprofen  Tablet. 600 milliGRAM(s) Oral three times a day PRN Mild Pain (1 - 3), Moderate Pain (4 - 6), Severe Pain (7 - 10)  lidocaine   4% Patch 1 Patch Transdermal daily PRN pain MSK      Vital Signs Last 24 Hrs  T(C): 36.6 (18 Oct 2022 05:00), Max: 36.7 (17 Oct 2022 15:30)  T(F): 97.8 (18 Oct 2022 05:00), Max: 98 (17 Oct 2022 15:30)  HR: 73 (18 Oct 2022 05:00) (73 - 137)  BP: 140/84 (18 Oct 2022 05:00) (122/77 - 142/89)  BP(mean): --  RR: 18 (18 Oct 2022 05:00) (18 - 18)  SpO2: 100% (18 Oct 2022 05:00) (97% - 100%)    Parameters below as of 18 Oct 2022 05:00  Patient On (Oxygen Delivery Method): room air      I&O's Detail      Physical Exam:  GENERAL: Lying in bed, well appearing, in NAD  HEART: S1S2 RRR  PULMONARY: CTABL, normal respiratory effort.    ABDOMEN: + Bowel sounds present, soft  EXTREMITIES:  Warm, well -perfused, distal pulses present  NEUROLOGICAL:AOx3    TELEMETERIC: Afib HR up to the 130 but now in SR HR 60-80s                          12.8   9.90  )-----------( 282      ( 18 Oct 2022 06:01 )             40.2       10-18    137  |  100  |  21  ----------------------------<  107<H>  4.6   |  26  |  0.89    Ca    9.1      18 Oct 2022 06:01  Phos  3.7     10-18  Mg     2.50     10-18      < from: Transthoracic Echocardiogram (10.11.22 @ 18:49) >  OBSERVATIONS:  Mitral Valve: Mitral annular calcification, otherwise  normal mitral valve. Minimal mitral regurgitation.  Aortic Root: Normal aortic root.  Aortic Valve: Aortic valve leaflet morphology not well  visualized.  Left Atrium: Normal left atrium.  Left Ventricle: Endocardium not well visualized; grossly  normal left ventricularsystolic function.  Estimated LVEF  about  63% (by Hammer's method).  Right Heart: Normal right atrium. Normal right ventricular  size and function. Normal tricuspid valve. Minimal  tricuspid regurgitation. Normal pulmonic valve.  Pericardium/PleuraNormal pericardium with no pericardial  effusion.  ------------------------------------------------------------------------  CONCLUSIONS:  1. Mitral annular calcification, otherwise normal mitral  valve. Minimal mitral regurgitation.  2. Endocardium not well visualized; grossly normal left  ventricular systolic function.  Estimated LVEF about  63%  (by Hammer's method).  3. Normal right ventricular size and function.  ------------------------------------------------------------------------  Confirmed on  10/12/2022 - 07:33:17 by John Trevizo M.D.,  [facility], [place]  ------------------------------

## 2022-10-18 NOTE — PROGRESS NOTE ADULT - NS ATTEND AMEND GEN_ALL_CORE FT
67-year-old man with a pmhx of lymphedema, previous hospitalization in 2019 for pneumonia c/b pAFib and patient self-converted who presented with fever, SOB, cough, fatigue, diarrhea found to have MSSA bacteremia. ID following and is started on antibiotics. Presented in AF and now converted to SR. Cont AC. Will follow as outpt.

## 2022-10-18 NOTE — PROGRESS NOTE ADULT - SUBJECTIVE AND OBJECTIVE BOX
Patient is a 67y old  Male who presents with a chief complaint of shortness of breath, fever (17 Oct 2022 17:24)     INTERVAL HPI/OVERNIGHT EVENTS:  - No acute events overnight    SUBJECTIVE  - Patient seen and evaluated at bedside  - Patient reports presence of   - Patient reports absence of fevers, chills, HA, lightheadedness, dizziness, nausea, emesis, chest pain, dyspnea, palpitations, abd pain, diarrhea, urinary symptoms, skin color changes or rashes, or LE edema     MEDICATIONS  (STANDING):  ceFAZolin   IVPB      ceFAZolin   IVPB 2000 milliGRAM(s) IV Intermittent every 8 hours  diltiazem    Tablet 60 milliGRAM(s) Oral three times a day  enoxaparin Injectable 90 milliGRAM(s) SubCutaneous every 12 hours  influenza  Vaccine (HIGH DOSE) 0.7 milliLiter(s) IntraMuscular once  lidocaine   4% Patch 1 Patch Transdermal daily  mupirocin 2% Ointment 1 Application(s) Both Nostrils two times a day    MEDICATIONS  (PRN):  acetaminophen     Tablet .. 650 milliGRAM(s) Oral every 6 hours PRN Temp greater or equal to 38C (100.4F), Mild Pain (1 - 3), Moderate Pain (4 - 6), Severe Pain (7 - 10)  cyclobenzaprine 10 milliGRAM(s) Oral three times a day PRN Muscle Spasm  ibuprofen  Tablet. 600 milliGRAM(s) Oral three times a day PRN Mild Pain (1 - 3), Moderate Pain (4 - 6), Severe Pain (7 - 10)  lidocaine   4% Patch 1 Patch Transdermal daily PRN pain MSK    Allergies:  No Known Allergies    Intolerances:      REVIEW OF SYSTEMS: As indicated above; otherwise, negative    VITAL SIGNS:  T(F): 97.8 (10-18-22 @ 05:00), Max: 98 (10-17-22 @ 15:30)  HR: 73 (10-18-22 @ 05:00) (73 - 137)  BP: 140/84 (10-18-22 @ 05:00) (122/77 - 142/89)  RR: 18 (10-18-22 @ 05:00) (18 - 18)  SpO2: 100% (10-18-22 @ 05:00) (97% - 100%)    PHYSICAL EXAM:  General: NAD, well-groomed, well-developed  Eyes: Conjunctiva and sclera clear  ENMT: Moist mucous membranes  Neck: No palpable pre-auricular, post-auricular, occipital, mandibular, submental, supra-clavicular, or infra-clavicular lymph nodes   Chest: Clear to auscultation bilaterally; no rales, rhonchi, or wheezing  Heart: Regular rate and rhythm; normal S1 and S2; no murmurs, rubs, or gallops  Abd: Soft, nontender, nondistended  Nervous System: AAOX3  Psych: Appropriate affect  Ext: no peripheral LE edema bilaterally    LABS:      Ca    8.9        17 Oct 2022 06:15      CAPILLARY BLOOD GLUCOSE        BLOOD CULTURE  10-15 @ 05:40   No growth to date.  --  --  10-15 @ 05:22   No growth to date.  --  --    RADIOLOGY & ADDITIONAL TESTS:    Imaging Personally Reviewed:  [X ] YES     Consultant(s) Notes Reviewed:  Yes    Care Discussed with Consultants/Other Providers: Yes Patient is a 67y old  Male who presents with a chief complaint of shortness of breath, fever (17 Oct 2022 17:24)     INTERVAL HPI/OVERNIGHT EVENTS:  - Overnight, patient was in afib in the 130's and went into normal sinus rhythm around 4am. Patient was asymptomatic during that time.      SUBJECTIVE  - Patient seen and evaluated at bedside. Patient reports that his neck pain is continuing to improve.   - Patient reports absence of fevers, chills, headache, lightheadedness, dizziness, nausea, emesis, chest pain, dyspnea, palpitations, abdominal pain, diarrhea, urinary symptoms, skin color changes or rashes, or LE edema     MEDICATIONS  (STANDING):  ceFAZolin   IVPB      ceFAZolin   IVPB 2000 milliGRAM(s) IV Intermittent every 8 hours  diltiazem    Tablet 60 milliGRAM(s) Oral three times a day  enoxaparin Injectable 90 milliGRAM(s) SubCutaneous every 12 hours  influenza  Vaccine (HIGH DOSE) 0.7 milliLiter(s) IntraMuscular once  lidocaine   4% Patch 1 Patch Transdermal daily  mupirocin 2% Ointment 1 Application(s) Both Nostrils two times a day    MEDICATIONS  (PRN):  acetaminophen     Tablet .. 650 milliGRAM(s) Oral every 6 hours PRN Temp greater or equal to 38C (100.4F), Mild Pain (1 - 3), Moderate Pain (4 - 6), Severe Pain (7 - 10)  cyclobenzaprine 10 milliGRAM(s) Oral three times a day PRN Muscle Spasm  ibuprofen  Tablet. 600 milliGRAM(s) Oral three times a day PRN Mild Pain (1 - 3), Moderate Pain (4 - 6), Severe Pain (7 - 10)  lidocaine   4% Patch 1 Patch Transdermal daily PRN pain MSK    Allergies:  No Known Allergies    Intolerances:      REVIEW OF SYSTEMS: As indicated above; otherwise, negative    VITAL SIGNS:  T(F): 97.8 (10-18-22 @ 05:00), Max: 98 (10-17-22 @ 15:30)  HR: 73 (10-18-22 @ 05:00) (73 - 137)  BP: 140/84 (10-18-22 @ 05:00) (122/77 - 142/89)  RR: 18 (10-18-22 @ 05:00) (18 - 18)  SpO2: 100% (10-18-22 @ 05:00) (97% - 100%)    PHYSICAL EXAM:  General: NAD, well-groomed, well-developed  Eyes: Pupils equal, round, reactive to light, extraocular muscles intact  ENMT: Moist mucous membranes  Neck: no thyromegaly, no JVD, no lymphadenopathy   Chest: Clear to auscultation bilaterally; no rales, rhonchi, or wheezing  Heart: Regular rate and rhythm; normal S1 and S2; no murmurs, rubs, or gallops  Abdomen: Soft, nontender, nondistended, normoactive bowel sounds   Extremity: Left lower foot under dressing. Right knee non-tender  Nervous System: AOx3, no focal neurological deficits     LABS:      Ca    8.9        17 Oct 2022 06:15      CAPILLARY BLOOD GLUCOSE        BLOOD CULTURE  10-15 @ 05:40   No growth to date.  --  --  10-15 @ 05:22   No growth to date.  --  --    RADIOLOGY & ADDITIONAL TESTS:    Imaging Personally Reviewed:  [X ] YES     Consultant(s) Notes Reviewed:  Yes    Care Discussed with Consultants/Other Providers: Yes

## 2022-10-18 NOTE — PROGRESS NOTE ADULT - PROBLEM SELECTOR PLAN 3
History of lymphedema treated by podiatry and [facility] vascular surgery. Patient notes redness of toe comes and goes, with swelling and warmth of toe 3 days prior to admission that is resolving.  - appreciate podiatry recs, possible source of infection given MSSA bacteremia  - c/w cefazolin iv q8 through 11/12/2022, will need echo prior to end of antibiotic course  - PICC ordered placed, will f/u  - Wound Cx: many S. aureus  - MRI L foot shows "osseous edema with areas of T1 marrow signal abnormality within the base of the second proximal phalanx and within the fragmented second metatarsal head, which may be posttraumatic or be related to septic arthritis/osteomyelitis"

## 2022-10-18 NOTE — PROGRESS NOTE ADULT - ASSESSMENT
67 year old with history of left foot infection presents with fever, cough and shortness of breath.  Found to have new pulmonary opacities and MSSA bacteremia.     1) MSSA bacteremia  cefazolin 2 gram iv q 8  Blood culture from 10/15 without growth    TTE limited- with positive repeat blood cultures on 10/12  check a OMI if pt agreeable     Will need 4 week iv antibiotics from date of clearance at a minimum    2) Left third toe erythema  Podiatry evaluation- s/p I and D  Follow up wound culture- growing staph aureus      3) Abnormal chest imaging  Cough with changes on chest imaging  S/p 5 days of levaquin  can stop levaquin    Check urine histoplasmosis  Psittacosis serology-IG G +, but IgM negative- not suggestive of acute infection  Will need follow up imaging with pmd    Jason Stearns  Attending Physician   Division of Infectious Disease  Office #[phone]  Available on Microsoft Teams also  After 5pm/weekend or no response, call #[phone]

## 2022-10-18 NOTE — DIETITIAN INITIAL EVALUATION ADULT - OTHER INFO
Pt 68 yo male with lymphedema (podiatry, vascular surgery outpatient) presented with SOB, fever, diarrhea; Pt also with left foot abscess - per chart review.     At time of visit, Pt awake, alert. Per Pt his appetite usually good; no chewing or swallowing difficulty; no nausea, vomiting or diarrhea @ this time. Per Pt, his height: ~69" & his weight: ~93 kg. No report of weight loss or weight changes PTA. No food related concerns voiced @ present. RDN remains available, Pt made aware.

## 2022-10-18 NOTE — DIETITIAN INITIAL EVALUATION ADULT - ADD RECOMMEND
1. Encourage & assist Pt with meals; Monitor PO diet tolerance;   2. Honor food preferences;   3. Add Multivitamins 1 tab daily for micronutrient coverage;   4. Monitor labs, hydration status;  1. Encourage & assist Pt with meals; Monitor PO diet tolerance;   2. Honor food preferences;   3. Add Multivitamins 1 tab daily for micronutrient coverage;   4. Monitor labs, hydration status;   5. Pt to follow up with weight management dietitian for gradual weight loss towards her ideal body weight range;

## 2022-10-18 NOTE — PROGRESS NOTE ADULT - SUBJECTIVE AND OBJECTIVE BOX
Patient is a 67y old  Male who presents with a chief complaint of shortness of breath, fever (18 Oct 2022 09:37)       INTERVAL HPI/OVERNIGHT EVENTS:  Patient seen and evaluated at bedside.  Pt is resting comfortable in NAD. Denies N/V/F/C.  Pain rated at X/10    Allergies    No Known Allergies    Intolerances        Vital Signs Last 24 Hrs  T(C): 36.6 (18 Oct 2022 05:00), Max: 36.7 (17 Oct 2022 15:30)  T(F): 97.8 (18 Oct 2022 05:00), Max: 98 (17 Oct 2022 15:30)  HR: 73 (18 Oct 2022 05:00) (73 - 137)  BP: 140/84 (18 Oct 2022 05:00) (122/77 - 142/89)  BP(mean): --  RR: 18 (18 Oct 2022 05:00) (18 - 18)  SpO2: 100% (18 Oct 2022 05:00) (97% - 100%)    Parameters below as of 18 Oct 2022 05:00  Patient On (Oxygen Delivery Method): room air        LABS:                        12.8   9.90  )-----------( 282      ( 18 Oct 2022 06:01 )             40.2     10-18    137  |  100  |  21  ----------------------------<  107<H>  4.6   |  26  |  0.89    Ca    9.1      18 Oct 2022 06:01  Phos  3.7     10-18  Mg     2.50     10-18          CAPILLARY BLOOD GLUCOSE          Lower Extremity Physical Exam:  Vascular: DP/PT 2/4, B/L, CFT <3 seconds B/L, Temperature gradient warm to warm L, warm to cool R/.   Neuro: Epicritic sensation diminished to the level of midfoot, B/L.  Musculoskeletal/Ortho: pitting edema to ankle and lower leg B/L  Skin: R foot no wounds, no acute signs of infection. L foot s/p submet 3 bullae deroofed on 10/12, wound at submet 3 area to subQ with tracking sinus to 3rd MPJ capsule, tracking noted distally ~1cm, mild serosanguineous drainage, no pus.     RADIOLOGY & ADDITIONAL TESTS:

## 2022-10-18 NOTE — DISCHARGE NOTE NURSING/CASE MANAGEMENT/SOCIAL WORK - NSSCTYPOFSERV_GEN_ALL_CORE
SOC 10/19/22. Infusion Nurse to see patient at home at 2 PM. SOC 10/19/22. Infusion Nurse to see patient at home  SOC 10/20/22. Infusion Nurse to see patient at home   10/20/22. Infusion Nurse to see patient at home at 2pm

## 2022-10-18 NOTE — PROGRESS NOTE ADULT - ASSESSMENT
This is a 67-year-old man with a pmhx of lymphedema, previous hospitalization in 2019 for pneumonia c/b pAFib and patient self-converted who presented with fever, SOB, cough, fatigue, diarrhea found to have MSSA bacteremia. ID following and is started on antibiotics. EP consulted for Afib management.    Plan:  - Continuous telemetric monitoring  - Monitor electrolytes and replete K to 4 and Mg to 2  - TTE as above normal EF 63%   - Continue Diltiazem 60mg TID for rate control  - Continue Eliquis 5mg po BID for thromboembolic ppx, [id number] score of 1  - Appreciated ID recs  - Continue care per primary team    Alessio Conde PA-C  Patient to be staffed with attending. Please await attending addendum This is a 67-year-old man with a pmhx of lymphedema, previous hospitalization in 2019 for pneumonia c/b pAFib and patient self-converted who presented with fever, SOB, cough, fatigue, diarrhea found to have MSSA bacteremia. ID following and is started on antibiotics. EP consulted for Afib management.    Plan:  - Continuous telemetric monitoring  - Monitor electrolytes and replete K to 4 and Mg to 2  - TTE as above normal EF 63%   - Continue Diltiazem 60mg TID for rate control  - Start Eliquis 5mg po BID for thromboembolic ppx, [id number] score of 1  - Appreciated ID recs  - Continue care per primary team    Alessio Conde PA-C  Patient to be staffed with attending. Please await attending addendum This is a 67-year-old man with a pmhx of lymphedema, previous hospitalization in 2019 for pneumonia c/b pAFib and patient self-converted who presented with fever, SOB, cough, fatigue, diarrhea found to have MSSA bacteremia. ID following and is started on antibiotics. EP consulted for Afib management. Patient is now in SR. EP will sign off.     Plan:  - Continuous telemetric monitoring  - Monitor electrolytes and replete K to 4 and Mg to 2  - TTE as above normal EF 63%   - Continue Diltiazem 60mg TID for rate control  - Start Eliquis 5mg po BID for thromboembolic ppx, [id number] score of 1  -Patient is schedule for an appointment on 11/11/2022 at 10:30 ( 4th floor Oncology building [facility] [address], [place], [place], [address] [phone] )  - Appreciated ID recs  - Continue care per primary team    Alessio Conde PA-C  Patient to be staffed with attending. Please await attending addendum

## 2022-10-18 NOTE — PROGRESS NOTE ADULT - PROBLEM SELECTOR PLAN 4
Hx of AF with spontaneous resolution previous admission also in setting of pneumonia. Pneumonia is again likely trigger.   - CHADVASC 1  - s/p heparin gtt, transitioned to lovenox therapeutic, will be discharged on Eliquis   - went into Afib, EP recommended starting Diltiazem 60mg TID  - CHADVASC low   - TTE wnl   - TSH wnl  - monitor HR  - recommend f/u with EP outpatient  - patient refusing OMI as he says "his heart is fine"

## 2022-10-18 NOTE — DISCHARGE NOTE NURSING/CASE MANAGEMENT/SOCIAL WORK - PATIENT PORTAL LINK FT
You can access the FollowMyHealth Patient Portal offered by [facility] by registering at the following website: [email or web address]. By joining [employer]’s FollowMyHealth portal, you will also be able to view your health information using other applications (apps) compatible with our system.

## 2022-10-18 NOTE — PROGRESS NOTE ADULT - ASSESSMENT
67y Male with L foot submet 3 wound to capsule  - Patient seen and evaluated.  - Afebrile, WBC 9.90  - R foot no wounds, no acute signs of infection. L foot s/p submet 3 bullae deroofed on 10/12, wound at submet 3 area to subQ with tracking sinus to 3rd MPJ capsule, tracking noted distally ~1cm, mild serosanguineous drainage, no pus.   - L foot MR: post traumatic vs septic arthritis/OM of 2nd proximal phalanx, osseous edema of 1st/2nd DP (inconsistent clinically as patient has a localized wound submet 3 of the L foot)  - L foot wound culture: MSSA (final).  - ID recs, appreciated  - Patient is stable for  from podiatry standpoint discharge   - F/u information and instructions can be found in the f/u section of d/c provider note   - Discussed with attending.

## 2022-10-18 NOTE — PROGRESS NOTE ADULT - PROBLEM SELECTOR PLAN 5
DVT PPx: lovenox therapeutic  Diet: regular  Dispo: home, most likely tomorrow 10/19 following PICC placement

## 2022-10-19 LAB
ALBUMIN SERPL ELPH-MCNC: 3.2 G/DL — LOW (ref 3.3–5)
ALP SERPL-CCNC: 64 U/L — SIGNIFICANT CHANGE UP (ref 40–120)
ALT FLD-CCNC: 18 U/L — SIGNIFICANT CHANGE UP (ref 4–41)
ANION GAP SERPL CALC-SCNC: 10 MMOL/L — SIGNIFICANT CHANGE UP (ref 7–14)
AST SERPL-CCNC: 24 U/L — SIGNIFICANT CHANGE UP (ref 4–40)
BILIRUB SERPL-MCNC: 0.4 MG/DL — SIGNIFICANT CHANGE UP (ref 0.2–1.2)
BUN SERPL-MCNC: 22 MG/DL — SIGNIFICANT CHANGE UP (ref 7–23)
CALCIUM SERPL-MCNC: 8.5 MG/DL — SIGNIFICANT CHANGE UP (ref 8.4–10.5)
CHLORIDE SERPL-SCNC: 100 MMOL/L — SIGNIFICANT CHANGE UP (ref 98–107)
CO2 SERPL-SCNC: 26 MMOL/L — SIGNIFICANT CHANGE UP (ref 22–31)
CREAT SERPL-MCNC: 0.87 MG/DL — SIGNIFICANT CHANGE UP (ref 0.5–1.3)
EGFR: 95 ML/MIN/1.73M2 — SIGNIFICANT CHANGE UP
GLUCOSE SERPL-MCNC: 109 MG/DL — HIGH (ref 70–99)
H CAPSUL AG SPEC-ACNC: SIGNIFICANT CHANGE UP
H CAPSUL AG UR IA-ACNC: SIGNIFICANT CHANGE UP NG/ML
H CAPSUL AG UR QL IA: SIGNIFICANT CHANGE UP
HCT VFR BLD CALC: 35.3 % — LOW (ref 39–50)
HGB BLD-MCNC: 11.2 G/DL — LOW (ref 13–17)
MAGNESIUM SERPL-MCNC: 2.2 MG/DL — SIGNIFICANT CHANGE UP (ref 1.6–2.6)
MCHC RBC-ENTMCNC: 29.9 PG — SIGNIFICANT CHANGE UP (ref 27–34)
MCHC RBC-ENTMCNC: 31.7 GM/DL — LOW (ref 32–36)
MCV RBC AUTO: 94.4 FL — SIGNIFICANT CHANGE UP (ref 80–100)
NRBC # BLD: 0 /100 WBCS — SIGNIFICANT CHANGE UP (ref 0–0)
NRBC # FLD: 0 K/UL — SIGNIFICANT CHANGE UP (ref 0–0)
PHOSPHATE SERPL-MCNC: 3.4 MG/DL — SIGNIFICANT CHANGE UP (ref 2.5–4.5)
PLATELET # BLD AUTO: 310 K/UL — SIGNIFICANT CHANGE UP (ref 150–400)
POTASSIUM SERPL-MCNC: 4.5 MMOL/L — SIGNIFICANT CHANGE UP (ref 3.5–5.3)
POTASSIUM SERPL-SCNC: 4.5 MMOL/L — SIGNIFICANT CHANGE UP (ref 3.5–5.3)
PROT SERPL-MCNC: 6.8 G/DL — SIGNIFICANT CHANGE UP (ref 6–8.3)
RBC # BLD: 3.74 M/UL — LOW (ref 4.2–5.8)
RBC # FLD: 14 % — SIGNIFICANT CHANGE UP (ref 10.3–14.5)
SODIUM SERPL-SCNC: 136 MMOL/L — SIGNIFICANT CHANGE UP (ref 135–145)
WBC # BLD: 9.72 K/UL — SIGNIFICANT CHANGE UP (ref 3.8–10.5)
WBC # FLD AUTO: 9.72 K/UL — SIGNIFICANT CHANGE UP (ref 3.8–10.5)

## 2022-10-19 PROCEDURE — 36573 INSJ PICC RS&I 5 YR+: CPT

## 2022-10-19 PROCEDURE — 99232 SBSQ HOSP IP/OBS MODERATE 35: CPT | Mod: GC

## 2022-10-19 RX ORDER — CHLORHEXIDINE GLUCONATE 213 G/1000ML
1 SOLUTION TOPICAL
Refills: 0 | Status: DISCONTINUED | OUTPATIENT
Start: 2022-10-19 | End: 2022-10-20

## 2022-10-19 RX ORDER — DILTIAZEM HCL 120 MG
1 CAPSULE, EXT RELEASE 24 HR ORAL
Qty: 14 | Refills: 0
Start: 2022-10-19 | End: 2022-11-01

## 2022-10-19 RX ORDER — SODIUM CHLORIDE 9 MG/ML
10 INJECTION INTRAMUSCULAR; INTRAVENOUS; SUBCUTANEOUS
Refills: 0 | Status: DISCONTINUED | OUTPATIENT
Start: 2022-10-19 | End: 2022-10-20

## 2022-10-19 RX ADMIN — Medication 100 MILLIGRAM(S): at 03:10

## 2022-10-19 RX ADMIN — ENOXAPARIN SODIUM 90 MILLIGRAM(S): 100 INJECTION SUBCUTANEOUS at 22:18

## 2022-10-19 RX ADMIN — ENOXAPARIN SODIUM 90 MILLIGRAM(S): 100 INJECTION SUBCUTANEOUS at 05:12

## 2022-10-19 RX ADMIN — MUPIROCIN 1 APPLICATION(S): 20 OINTMENT TOPICAL at 05:13

## 2022-10-19 RX ADMIN — Medication 60 MILLIGRAM(S): at 15:01

## 2022-10-19 RX ADMIN — Medication 60 MILLIGRAM(S): at 22:18

## 2022-10-19 RX ADMIN — Medication 100 MILLIGRAM(S): at 11:19

## 2022-10-19 RX ADMIN — Medication 100 MILLIGRAM(S): at 22:17

## 2022-10-19 RX ADMIN — Medication 60 MILLIGRAM(S): at 05:12

## 2022-10-19 NOTE — PROGRESS NOTE ADULT - SUBJECTIVE AND OBJECTIVE BOX
Patient is a 67y old  Male who presents with a chief complaint of shortness of breath, fever (18 Oct 2022 14:23)     INTERVAL HPI/OVERNIGHT EVENTS:  - No acute events overnight    SUBJECTIVE  - Patient seen and evaluated at bedside  - Patient reports presence of   - Patient reports absence of fevers, chills, HA, lightheadedness, dizziness, nausea, emesis, chest pain, dyspnea, palpitations, abd pain, diarrhea, urinary symptoms, skin color changes or rashes, or LE edema     MEDICATIONS  (STANDING):  ceFAZolin   IVPB 2000 milliGRAM(s) IV Intermittent every 8 hours  ceFAZolin   IVPB      diltiazem    Tablet 60 milliGRAM(s) Oral three times a day  enoxaparin Injectable 90 milliGRAM(s) SubCutaneous every 12 hours  influenza  Vaccine (HIGH DOSE) 0.7 milliLiter(s) IntraMuscular once  lidocaine   4% Patch 1 Patch Transdermal daily    MEDICATIONS  (PRN):  acetaminophen     Tablet .. 650 milliGRAM(s) Oral every 6 hours PRN Temp greater or equal to 38C (100.4F), Mild Pain (1 - 3), Moderate Pain (4 - 6), Severe Pain (7 - 10)  cyclobenzaprine 10 milliGRAM(s) Oral three times a day PRN Muscle Spasm  ibuprofen  Tablet. 600 milliGRAM(s) Oral three times a day PRN Mild Pain (1 - 3), Moderate Pain (4 - 6), Severe Pain (7 - 10)  lidocaine   4% Patch 1 Patch Transdermal daily PRN pain MSK    Allergies:  No Known Allergies    Intolerances:      REVIEW OF SYSTEMS: As indicated above; otherwise, negative    VITAL SIGNS:  T(F): 98 (10-19-22 @ 05:10), Max: 98.1 (10-18-22 @ 22:05)  HR: 81 (10-19-22 @ 05:10) (77 - 93)  BP: 121/77 (10-19-22 @ 05:10) (115/68 - 135/75)  RR: 18 (10-19-22 @ 05:10) (18 - 18)  SpO2: 96% (10-19-22 @ 05:10) (96% - 100%)    PHYSICAL EXAM:  General: NAD, well-groomed, well-developed  Eyes: Conjunctiva and sclera clear  ENMT: Moist mucous membranes  Neck: No palpable pre-auricular, post-auricular, occipital, mandibular, submental, supra-clavicular, or infra-clavicular lymph nodes   Chest: Clear to auscultation bilaterally; no rales, rhonchi, or wheezing  Heart: Regular rate and rhythm; normal S1 and S2; no murmurs, rubs, or gallops  Abd: Soft, nontender, nondistended  Nervous System: AAOX3  Psych: Appropriate affect  Ext: no peripheral LE edema bilaterally    LABS:                        11.2   9.72  )-----------( 310      ( 19 Oct 2022 06:28 )             35.3     19 Oct 2022 06:28    136    |  100    |  22     ----------------------------<  109    4.5     |  26     |  0.87     Ca    8.5        19 Oct 2022 06:28  Phos  3.4       19 Oct 2022 06:28  Mg     2.20      19 Oct 2022 06:28    TPro  6.8    /  Alb  3.2    /  TBili  0.4    /  DBili  x      /  AST  24     /  ALT  18     /  AlkPhos  64     19 Oct 2022 06:28    CAPILLARY BLOOD GLUCOSE        BLOOD CULTURE    RADIOLOGY & ADDITIONAL TESTS:    Imaging Personally Reviewed:  [X ] YES     Consultant(s) Notes Reviewed:  Yes    Care Discussed with Consultants/Other Providers: Yes Patient is a 67y old  Male who presents with a chief complaint of shortness of breath, fever (18 Oct 2022 14:23)     INTERVAL HPI/OVERNIGHT EVENTS:  - No acute events overnight    SUBJECTIVE  - Patient seen and evaluated at bedside. Patient states that he feels well and his neck pain has completely resolved.   - Patient reports absence of fevers, chills, headache, lightheadedness, dizziness, nausea, emesis, chest pain, dyspnea, palpitations, abdominal pain, diarrhea, urinary symptoms, skin color changes or rashes, or LE edema     MEDICATIONS  (STANDING):  ceFAZolin   IVPB 2000 milliGRAM(s) IV Intermittent every 8 hours  ceFAZolin   IVPB      diltiazem    Tablet 60 milliGRAM(s) Oral three times a day  enoxaparin Injectable 90 milliGRAM(s) SubCutaneous every 12 hours  influenza  Vaccine (HIGH DOSE) 0.7 milliLiter(s) IntraMuscular once  lidocaine   4% Patch 1 Patch Transdermal daily    MEDICATIONS  (PRN):  acetaminophen     Tablet .. 650 milliGRAM(s) Oral every 6 hours PRN Temp greater or equal to 38C (100.4F), Mild Pain (1 - 3), Moderate Pain (4 - 6), Severe Pain (7 - 10)  cyclobenzaprine 10 milliGRAM(s) Oral three times a day PRN Muscle Spasm  ibuprofen  Tablet. 600 milliGRAM(s) Oral three times a day PRN Mild Pain (1 - 3), Moderate Pain (4 - 6), Severe Pain (7 - 10)  lidocaine   4% Patch 1 Patch Transdermal daily PRN pain MSK    Allergies:  No Known Allergies    Intolerances:      REVIEW OF SYSTEMS: As indicated above; otherwise, negative    VITAL SIGNS:  T(F): 98 (10-19-22 @ 05:10), Max: 98.1 (10-18-22 @ 22:05)  HR: 81 (10-19-22 @ 05:10) (77 - 93)  BP: 121/77 (10-19-22 @ 05:10) (115/68 - 135/75)  RR: 18 (10-19-22 @ 05:10) (18 - 18)  SpO2: 96% (10-19-22 @ 05:10) (96% - 100%)    PHYSICAL EXAM:  General: NAD, well-groomed, well-developed  Eyes: Pupils equal, round, reactive to light, extraocular muscles intact  ENMT: Moist mucous membranes  Neck: no thyromegaly, no JVD, no lymphadenopathy   Chest: Clear to auscultation bilaterally; no rales, rhonchi, or wheezing  Heart: Regular rate and rhythm; normal S1 and S2; no murmurs, rubs, or gallops  Abdomen: Soft, nontender, nondistended, normoactive bowel sounds   Extremity: Left lower foot under dressing. Right knee non-tender  Nervous System: AOx3, no focal neurological deficits     LABS:                        11.2   9.72  )-----------( 310      ( 19 Oct 2022 06:28 )             35.3     19 Oct 2022 06:28    136    |  100    |  22     ----------------------------<  109    4.5     |  26     |  0.87     Ca    8.5        19 Oct 2022 06:28  Phos  3.4       19 Oct 2022 06:28  Mg     2.20      19 Oct 2022 06:28    TPro  6.8    /  Alb  3.2    /  TBili  0.4    /  DBili  x      /  AST  24     /  ALT  18     /  AlkPhos  64     19 Oct 2022 06:28    CAPILLARY BLOOD GLUCOSE        BLOOD CULTURE    RADIOLOGY & ADDITIONAL TESTS:    Imaging Personally Reviewed:  [X ] YES     Consultant(s) Notes Reviewed:  Yes    Care Discussed with Consultants/Other Providers: Yes

## 2022-10-19 NOTE — PROGRESS NOTE ADULT - PROBLEM SELECTOR PLAN 3
History of lymphedema treated by podiatry and [facility] vascular surgery. Patient notes redness of toe comes and goes, with swelling and warmth of toe 3 days prior to admission that is resolving.  - appreciate podiatry recs, possible source of infection given MSSA bacteremia  - c/w cefazolin iv q8 through 11/12/2022, will need echo prior to end of antibiotic course  - PICC ordered placed, will f/u  - Wound Cx: many S. aureus  - MRI L foot shows "osseous edema with areas of T1 marrow signal abnormality within the base of the second proximal phalanx and within the fragmented second metatarsal head, which may be posttraumatic or be related to septic arthritis/osteomyelitis" History of lymphedema treated by podiatry and [facility] vascular surgery. Patient notes redness of toe comes and goes, with swelling and warmth of toe 3 days prior to admission that is resolving.  - appreciate podiatry recs, possible source of infection given MSSA bacteremia  - c/w cefazolin iv q8 through 11/12/2022, will need echo prior to end of antibiotic course  - PICC placed today, 10/19  - Wound Cx: many S. aureus  - MRI L foot shows "osseous edema with areas of T1 marrow signal abnormality within the base of the second proximal phalanx and within the fragmented second metatarsal head, which may be posttraumatic or be related to septic arthritis/osteomyelitis"

## 2022-10-19 NOTE — PROGRESS NOTE ADULT - PROBLEM SELECTOR PLAN 5
DVT PPx: lovenox therapeutic  Diet: regular  Dispo: home, most likely tomorrow 10/19 following PICC placement DVT PPx: lovenox therapeutic  Diet: regular  Dispo: home, most likely tomorrow 10/20

## 2022-10-19 NOTE — PROGRESS NOTE ADULT - RESPIRATORY
clear to auscultation bilaterally/no respiratory distress
clear to auscultation bilaterally/no respiratory distress

## 2022-10-19 NOTE — CHART NOTE - NSCHARTNOTEFT_GEN_A_CORE
PRE-INTERVENTIONAL RADIOLOGY PROCEDURE NOTE      Patient Age: 67y    Patient Gender: Male    Procedure: PICC line placement     Diagnosis/Indication: MSSA Bacteremia     Interventional Radiology Attending Physician:    Ordering Attending Physician: Dr. Hoyos    Approved by: Jackie MEDINA     Pertinent Medical History:    Pertinent labs:                      11.2   9.72  )-----------( 310      ( 19 Oct 2022 06:28 )             35.3       10-19    136  |  100  |  22  ----------------------------<  109<H>  4.5   |  26  |  0.87    Ca    8.5      19 Oct 2022 06:28  Phos  3.4     10-19  Mg     2.20     10-19    TPro  6.8  /  Alb  3.2<L>  /  TBili  0.4  /  DBili  x   /  AST  24  /  ALT  18  /  AlkPhos  64  10-19              Patient and Family Aware ? Yes

## 2022-10-19 NOTE — PROGRESS NOTE ADULT - SUBJECTIVE AND OBJECTIVE BOX
CHICO WICK 67y [id number]    Patient is a 67y old  Male who presents with a chief complaint of shortness of breath, fever (19 Oct 2022 07:39)      Follow Up/CC:  ID following for bacteremia    Interval History/ROS: no fever, for PICC     Allergies    No Known Allergies    Intolerances        ANTIMICROBIALS:  ceFAZolin   IVPB    ceFAZolin   IVPB 2000 every 8 hours      MEDICATIONS  (STANDING):  ceFAZolin   IVPB      ceFAZolin   IVPB 2000 milliGRAM(s) IV Intermittent every 8 hours  diltiazem    Tablet 60 milliGRAM(s) Oral three times a day  enoxaparin Injectable 90 milliGRAM(s) SubCutaneous every 12 hours  influenza  Vaccine (HIGH DOSE) 0.7 milliLiter(s) IntraMuscular once  lidocaine   4% Patch 1 Patch Transdermal daily    MEDICATIONS  (PRN):  acetaminophen     Tablet .. 650 milliGRAM(s) Oral every 6 hours PRN Temp greater or equal to 38C (100.4F), Mild Pain (1 - 3), Moderate Pain (4 - 6), Severe Pain (7 - 10)  cyclobenzaprine 10 milliGRAM(s) Oral three times a day PRN Muscle Spasm  ibuprofen  Tablet. 600 milliGRAM(s) Oral three times a day PRN Mild Pain (1 - 3), Moderate Pain (4 - 6), Severe Pain (7 - 10)  lidocaine   4% Patch 1 Patch Transdermal daily PRN pain MSK        Vital Signs Last 24 Hrs  T(C): 36.7 (19 Oct 2022 05:10), Max: 36.7 (18 Oct 2022 22:05)  T(F): 98 (19 Oct 2022 05:10), Max: 98.1 (18 Oct 2022 22:05)  HR: 81 (19 Oct 2022 05:10) (77 - 86)  BP: 121/77 (19 Oct 2022 05:10) (117/80 - 135/75)  BP(mean): --  RR: 18 (19 Oct 2022 05:10) (18 - 18)  SpO2: 96% (19 Oct 2022 05:10) (96% - 99%)    Parameters below as of 19 Oct 2022 05:10  Patient On (Oxygen Delivery Method): room air        CBC Full  -  ( 19 Oct 2022 06:28 )  WBC Count : 9.72 K/uL  RBC Count : 3.74 M/uL  Hemoglobin : 11.2 g/dL  Hematocrit : 35.3 %  Platelet Count - Automated : 310 K/uL  Mean Cell Volume : 94.4 fL  Mean Cell Hemoglobin : 29.9 pg  Mean Cell Hemoglobin Concentration : 31.7 gm/dL  Auto Neutrophil # : x  Auto Lymphocyte # : x  Auto Monocyte # : x  Auto Eosinophil # : x  Auto Basophil # : x  Auto Neutrophil % : x  Auto Lymphocyte % : x  Auto Monocyte % : x  Auto Eosinophil % : x  Auto Basophil % : x    10-19    136  |  100  |  22  ----------------------------<  109<H>  4.5   |  26  |  0.87    Ca    8.5      19 Oct 2022 06:28  Phos  3.4     10-19  Mg     2.20     10-19    TPro  6.8  /  Alb  3.2<L>  /  TBili  0.4  /  DBili  x   /  AST  24  /  ALT  18  /  AlkPhos  64  10-19    LIVER FUNCTIONS - ( 19 Oct 2022 06:28 )  Alb: 3.2 g/dL / Pro: 6.8 g/dL / ALK PHOS: 64 U/L / ALT: 18 U/L / AST: 24 U/L / GGT: x               MICROBIOLOGY:  .Blood Blood-Peripheral  10-15-22   No growth to date.  --  --      .Blood Blood-Peripheral  10-15-22   No growth to date.  --  --      .Abscess left foot wound  10-12-22   Numerous Staphylococcus aureus  --  Staphylococcus aureus      .Blood Blood-Peripheral  10-12-22   Growth in aerobic and anaerobic bottles: Staphylococcus aureus  See previous culture [id number]  --    Growth in aerobic bottle: Gram Positive Cocci in Clusters  Growth in anaerobic bottle: Gram Positive Cocci in Clusters      .Blood Blood-Peripheral  10-12-22   Growth in aerobic and anaerobic bottles: Staphylococcus aureus  See previous culture [id number]  --    Growth in anaerobic bottle: Gram Positive Cocci in Clusters  Growth in aerobic bottle: Gram Positive Cocci in Clusters      Clean Catch Clean Catch (Midstream)  10-10-22   <10,000 CFU/mL Normal Urogenital Chikis  --  --      .Blood Blood-Venous  10-10-22   Growth in aerobic and anaerobic bottles: Staphylococcus aureus  ***Blood Panel PCR results on this specimen are available  approximately 3 hours after the Gram stain result.***  Gram stain, PCR, and/or culture results may not always  correspond dueto difference in methodologies.  ************************************************************  This PCR assay was performed by multiplex PCR. This  Assay tests for 66 bacterial and resistance gene targets.  Please refer to the [facility] test directory  at [email or web address] for details.  --  Blood Culture PCR  Staphylococcus aureus      .Blood Blood-Venous  10-10-22   Growth in aerobic and anaerobic bottles: Staphylococcus aureus  See previous culture [id number]  --    Growth in aerobic bottle: Gram Positive Cocci in Clusters  Growth in anaerobic bottle: Gram Positive Cocci in Clusters              v            RADIOLOGY

## 2022-10-20 VITALS
HEART RATE: 77 BPM | SYSTOLIC BLOOD PRESSURE: 133 MMHG | OXYGEN SATURATION: 97 % | TEMPERATURE: 98 F | DIASTOLIC BLOOD PRESSURE: 76 MMHG | RESPIRATION RATE: 18 BRPM

## 2022-10-20 LAB
ANION GAP SERPL CALC-SCNC: 11 MMOL/L — SIGNIFICANT CHANGE UP (ref 7–14)
BUN SERPL-MCNC: 22 MG/DL — SIGNIFICANT CHANGE UP (ref 7–23)
CALCIUM SERPL-MCNC: 8.8 MG/DL — SIGNIFICANT CHANGE UP (ref 8.4–10.5)
CHLORIDE SERPL-SCNC: 102 MMOL/L — SIGNIFICANT CHANGE UP (ref 98–107)
CO2 SERPL-SCNC: 26 MMOL/L — SIGNIFICANT CHANGE UP (ref 22–31)
CREAT SERPL-MCNC: 0.93 MG/DL — SIGNIFICANT CHANGE UP (ref 0.5–1.3)
CULTURE RESULTS: SIGNIFICANT CHANGE UP
CULTURE RESULTS: SIGNIFICANT CHANGE UP
EGFR: 91 ML/MIN/1.73M2 — SIGNIFICANT CHANGE UP
GLUCOSE SERPL-MCNC: 129 MG/DL — HIGH (ref 70–99)
HCT VFR BLD CALC: 37.8 % — LOW (ref 39–50)
HGB BLD-MCNC: 12.2 G/DL — LOW (ref 13–17)
MAGNESIUM SERPL-MCNC: 2.1 MG/DL — SIGNIFICANT CHANGE UP (ref 1.6–2.6)
MCHC RBC-ENTMCNC: 30.3 PG — SIGNIFICANT CHANGE UP (ref 27–34)
MCHC RBC-ENTMCNC: 32.3 GM/DL — SIGNIFICANT CHANGE UP (ref 32–36)
MCV RBC AUTO: 93.8 FL — SIGNIFICANT CHANGE UP (ref 80–100)
NRBC # BLD: 0 /100 WBCS — SIGNIFICANT CHANGE UP (ref 0–0)
NRBC # FLD: 0 K/UL — SIGNIFICANT CHANGE UP (ref 0–0)
PHOSPHATE SERPL-MCNC: 3.4 MG/DL — SIGNIFICANT CHANGE UP (ref 2.5–4.5)
PLATELET # BLD AUTO: 301 K/UL — SIGNIFICANT CHANGE UP (ref 150–400)
POTASSIUM SERPL-MCNC: 4.4 MMOL/L — SIGNIFICANT CHANGE UP (ref 3.5–5.3)
POTASSIUM SERPL-SCNC: 4.4 MMOL/L — SIGNIFICANT CHANGE UP (ref 3.5–5.3)
RBC # BLD: 4.03 M/UL — LOW (ref 4.2–5.8)
RBC # FLD: 13.8 % — SIGNIFICANT CHANGE UP (ref 10.3–14.5)
SODIUM SERPL-SCNC: 139 MMOL/L — SIGNIFICANT CHANGE UP (ref 135–145)
SPECIMEN SOURCE: SIGNIFICANT CHANGE UP
SPECIMEN SOURCE: SIGNIFICANT CHANGE UP
WBC # BLD: 7.85 K/UL — SIGNIFICANT CHANGE UP (ref 3.8–10.5)
WBC # FLD AUTO: 7.85 K/UL — SIGNIFICANT CHANGE UP (ref 3.8–10.5)

## 2022-10-20 PROCEDURE — 99239 HOSP IP/OBS DSCHRG MGMT >30: CPT | Mod: GC

## 2022-10-20 RX ADMIN — ENOXAPARIN SODIUM 90 MILLIGRAM(S): 100 INJECTION SUBCUTANEOUS at 11:00

## 2022-10-20 RX ADMIN — Medication 100 MILLIGRAM(S): at 06:08

## 2022-10-20 RX ADMIN — Medication 60 MILLIGRAM(S): at 06:07

## 2022-10-20 NOTE — PROGRESS NOTE ADULT - SUBJECTIVE AND OBJECTIVE BOX
Patient is a 67y old  Male who presents with a chief complaint of shortness of breath, fever (19 Oct 2022 13:16)     INTERVAL HPI/OVERNIGHT EVENTS:  - No acute events overnight    SUBJECTIVE  - Patient seen and evaluated at bedside  - Patient reports presence of   - Patient reports absence of fevers, chills, HA, lightheadedness, dizziness, nausea, emesis, chest pain, dyspnea, palpitations, abd pain, diarrhea, urinary symptoms, skin color changes or rashes, or LE edema     MEDICATIONS  (STANDING):  ceFAZolin   IVPB      ceFAZolin   IVPB 2000 milliGRAM(s) IV Intermittent every 8 hours  chlorhexidine 4% Liquid 1 Application(s) Topical <User Schedule>  diltiazem    Tablet 60 milliGRAM(s) Oral three times a day  enoxaparin Injectable 90 milliGRAM(s) SubCutaneous every 12 hours  influenza  Vaccine (HIGH DOSE) 0.7 milliLiter(s) IntraMuscular once  lidocaine   4% Patch 1 Patch Transdermal daily    MEDICATIONS  (PRN):  acetaminophen     Tablet .. 650 milliGRAM(s) Oral every 6 hours PRN Temp greater or equal to 38C (100.4F), Mild Pain (1 - 3), Moderate Pain (4 - 6), Severe Pain (7 - 10)  cyclobenzaprine 10 milliGRAM(s) Oral three times a day PRN Muscle Spasm  ibuprofen  Tablet. 600 milliGRAM(s) Oral three times a day PRN Mild Pain (1 - 3), Moderate Pain (4 - 6), Severe Pain (7 - 10)  lidocaine   4% Patch 1 Patch Transdermal daily PRN pain MSK  sodium chloride 0.9% lock flush 10 milliLiter(s) IV Push every 1 hour PRN Pre/post blood products, medications, blood draw, and to maintain line patency    Allergies:  No Known Allergies    Intolerances:      REVIEW OF SYSTEMS: As indicated above; otherwise, negative    VITAL SIGNS:  T(F): 97.9 (10-20-22 @ 06:00), Max: 98.1 (10-19-22 @ 15:00)  HR: 80 (10-20-22 @ 06:00) (80 - 92)  BP: 111/63 (10-20-22 @ 06:00) (111/63 - 149/73)  RR: 18 (10-20-22 @ 06:00) (18 - 20)  SpO2: 96% (10-20-22 @ 06:00) (96% - 98%)    PHYSICAL EXAM:  General: NAD, well-groomed, well-developed  Eyes: Conjunctiva and sclera clear  ENMT: Moist mucous membranes  Neck: No palpable pre-auricular, post-auricular, occipital, mandibular, submental, supra-clavicular, or infra-clavicular lymph nodes   Chest: Clear to auscultation bilaterally; no rales, rhonchi, or wheezing  Heart: Regular rate and rhythm; normal S1 and S2; no murmurs, rubs, or gallops  Abd: Soft, nontender, nondistended  Nervous System: AAOX3  Psych: Appropriate affect  Ext: no peripheral LE edema bilaterally    LABS:      Ca    8.5        19 Oct 2022 06:28      CAPILLARY BLOOD GLUCOSE        BLOOD CULTURE    RADIOLOGY & ADDITIONAL TESTS:    Imaging Personally Reviewed:  [X ] YES     Consultant(s) Notes Reviewed:  Yes    Care Discussed with Consultants/Other Providers: Yes Patient is a 67y old  Male who presents with a chief complaint of shortness of breath, fever (19 Oct 2022 13:16)     INTERVAL HPI/OVERNIGHT EVENTS:  - No acute events overnight    SUBJECTIVE  - Patient seen and evaluated at bedside. Patient reports that his neck pain has completely resolved.   - Patient reports absence of fevers, chills, headache, lightheadedness, dizziness, nausea, emesis, chest pain, dyspnea, palpitations, abdominal pain, diarrhea, urinary symptoms, skin color changes or rashes, or LE edema     MEDICATIONS  (STANDING):  ceFAZolin   IVPB      ceFAZolin   IVPB 2000 milliGRAM(s) IV Intermittent every 8 hours  chlorhexidine 4% Liquid 1 Application(s) Topical <User Schedule>  diltiazem    Tablet 60 milliGRAM(s) Oral three times a day  enoxaparin Injectable 90 milliGRAM(s) SubCutaneous every 12 hours  influenza  Vaccine (HIGH DOSE) 0.7 milliLiter(s) IntraMuscular once  lidocaine   4% Patch 1 Patch Transdermal daily    MEDICATIONS  (PRN):  acetaminophen     Tablet .. 650 milliGRAM(s) Oral every 6 hours PRN Temp greater or equal to 38C (100.4F), Mild Pain (1 - 3), Moderate Pain (4 - 6), Severe Pain (7 - 10)  cyclobenzaprine 10 milliGRAM(s) Oral three times a day PRN Muscle Spasm  ibuprofen  Tablet. 600 milliGRAM(s) Oral three times a day PRN Mild Pain (1 - 3), Moderate Pain (4 - 6), Severe Pain (7 - 10)  lidocaine   4% Patch 1 Patch Transdermal daily PRN pain MSK  sodium chloride 0.9% lock flush 10 milliLiter(s) IV Push every 1 hour PRN Pre/post blood products, medications, blood draw, and to maintain line patency    Allergies:  No Known Allergies    Intolerances:      REVIEW OF SYSTEMS: As indicated above; otherwise, negative    VITAL SIGNS:  T(F): 97.9 (10-20-22 @ 06:00), Max: 98.1 (10-19-22 @ 15:00)  HR: 80 (10-20-22 @ 06:00) (80 - 92)  BP: 111/63 (10-20-22 @ 06:00) (111/63 - 149/73)  RR: 18 (10-20-22 @ 06:00) (18 - 20)  SpO2: 96% (10-20-22 @ 06:00) (96% - 98%)    PHYSICAL EXAM:  General: NAD, well-groomed, well-developed  Eyes: Pupils equal, round, reactive to light, extraocular muscles intact  ENMT: Moist mucous membranes  Neck: no thyromegaly, no JVD, no lymphadenopathy   Chest: Clear to auscultation bilaterally; no rales, rhonchi, or wheezing  Heart: Regular rate and rhythm; normal S1 and S2; no murmurs, rubs, or gallops  Abdomen: Soft, nontender, nondistended, normoactive bowel sounds   Extremity: Left lower foot under dressing. Right knee non-tender  Nervous System: AOx3, no focal neurological deficits       LABS:      Ca    8.5        19 Oct 2022 06:28      CAPILLARY BLOOD GLUCOSE        BLOOD CULTURE    RADIOLOGY & ADDITIONAL TESTS:    Imaging Personally Reviewed:  [X ] YES     Consultant(s) Notes Reviewed:  Yes    Care Discussed with Consultants/Other Providers: Yes

## 2022-10-20 NOTE — PROGRESS NOTE ADULT - SUBJECTIVE AND OBJECTIVE BOX
Patient is a 67y old  Male who presents with a chief complaint of shortness of breath, fever (20 Oct 2022 07:30)       INTERVAL HPI/OVERNIGHT EVENTS:  Patient seen and evaluated at bedside.  Pt is resting comfortable in NAD. Denies N/V/F/C.      Allergies    No Known Allergies    Intolerances        Vital Signs Last 24 Hrs  T(C): 36.6 (20 Oct 2022 06:00), Max: 36.7 (19 Oct 2022 15:00)  T(F): 97.9 (20 Oct 2022 06:00), Max: 98.1 (19 Oct 2022 15:00)  HR: 80 (20 Oct 2022 06:00) (80 - 92)  BP: 111/63 (20 Oct 2022 06:00) (111/63 - 149/73)  BP(mean): 74 (19 Oct 2022 17:40) (74 - 90)  RR: 18 (20 Oct 2022 06:00) (18 - 20)  SpO2: 96% (20 Oct 2022 06:00) (96% - 98%)    Parameters below as of 20 Oct 2022 06:00  Patient On (Oxygen Delivery Method): room air        LABS:                        12.2   7.85  )-----------( 301      ( 20 Oct 2022 07:25 )             37.8     10-20    139  |  102  |  22  ----------------------------<  129<H>  4.4   |  26  |  0.93    Ca    8.8      20 Oct 2022 07:25  Phos  3.4     10-20  Mg     2.10     10-20    TPro  6.8  /  Alb  3.2<L>  /  TBili  0.4  /  DBili  x   /  AST  24  /  ALT  18  /  AlkPhos  64  10-19        CAPILLARY BLOOD GLUCOSE          Lower Extremity Physical Exam:  Vascular: DP/PT 2/4, B/L, CFT <3 seconds B/L, Temperature gradient warm to warm L, warm to cool R/.   Neuro: Epicritic sensation diminished to the level of midfoot, B/L.  Musculoskeletal/Ortho: pitting edema to ankle and lower leg B/L  Skin: R foot no wounds, no acute signs of infection. L foot wound at submet 3 area to subQ with tracking sinus to 3rd MPJ capsule, tracking noted distally ~1cm, mild serosanguineous drainage, no pus.     RADIOLOGY & ADDITIONAL TESTS:

## 2022-10-20 NOTE — PROGRESS NOTE ADULT - PROBLEM SELECTOR PLAN 1
Fever, cough, shortness of breath, diarrhea x 2 days. Likely 2/2 pneumonia. Now with MSSA bacteremia. Concern for possibly skin source, podiatry deroofed abscess on left erythematous toe.  - CT chest 10/11 w/ diffuse GGO c/f multifocal pneumonia  - BCx 10/10: MSSA  - BCx 10/12: Gram positive   - BCx 10/15: NGTD, pending final  - Wound Cx 10/12: Many S. aureus  - legionella negative  - f/u histoplasma urine antigen  - Chlamydia pneumonia IgG +, otherwise chlamydia labs negative  - ID, podiatry consulted, appreciate recs  - s/p CTX and azithro  - c/w cefazolin 2g IV q8 (10/12 - ) and levaquin 500 mg PO daily (10/12 - ) Fever, cough, shortness of breath, diarrhea x 2 days. Likely 2/2 pneumonia. Now with MSSA bacteremia. Concern for possibly skin source, podiatry deroofed abscess on left erythematous toe.  - CT chest 10/11 w/ diffuse GGO c/f multifocal pneumonia  - BCx 10/10: MSSA  - BCx 10/12: Gram positive   - BCx 10/15: NGTD, pending final  - Wound Cx 10/12: Many S. aureus  - legionella negative  - histoplasma urine antigen negative   - Chlamydia pneumonia IgG +, otherwise chlamydia labs negative  - ID, podiatry consulted, appreciate recs  - s/p CTX and azithro  - c/w cefazolin 2g IV q8 (10/12 - ) and levaquin 500 mg PO daily (10/12 - )

## 2022-10-20 NOTE — PROGRESS NOTE ADULT - ATTENDING COMMENTS
67 y/M PMH  of lymphedema, pneumonia (hospitalized 2019, c/b pAF presented with fever, dyspnea, cough, and diarrhea, here febrile to 101.5 with diffuse b/l GGOs on CT imaging.   Pt seen and examined by me.  Feels well- No new complaints. Left shoulder pain significantly improved.  Updated need for home IV abx. Pt comfortable administering IV Abx as he did it  in July     # Sepsis likely secondary to  multifocal PNA/ MSSA bacteremia  BCx 10/10 positive for MSSA. Repeat cultures from 10/12 also positive  BCx 10/15: NGTD   CT chest 10/11 w/ diffuse GGO  Legionella negative. f/u histoplasma urine antigen  Psittacosis serology-IG G +, but IgM negative- not suggestive of acute infection  Will need follow up imaging with pmd  ID on board- Advise to check OMI, Check urine histoplasmosis. Pt refusing OMI despite explaining reason for OMI.   Appreciate ID recs- “If pt refuses OMI, plan on cefazolin 2 gram iv q 8 through 11/12/2022. Weekly cbc, bmp fax to [phone]. Repeat TTE with his pmd prior to end of antibiotic course”     # Right  knee pain- Resolved. No pain or swelling or erythema notes.  # Right foot bullae- present on admission, now s/p I&D by podiatry  Wound Cx 10/12: Many S. aureus  L foot s/p bullae deroofed on 10/12,  mild serosanguineous drainage, no pus. L foot wound.  MRI foot reviewed-Soft tissue ulcer along the plantar aspect of the third toe with   surrounding cellulitis. No drainable fluid collection. Osseous edema  within the base of the second proximal phalanx and within the fragmented second metatarsal head, which may be   posttraumatic or be related to septic arthritis/osteomyelitis.  As per Podiatry-Follow this as an outpt.     #History of paroxysmal afib- Episode of RVR to 130 on 10/17   Started on Diltiazem, HR in  now rates better controlled.   TTE grossly unremarkable, TSH wnl.  [id number] score of 1 ( Age >65, no history of DM, HTN, TIA/CVA or HF).   Currently on Lovenox. Was started on Eliquis in 2019 by EP but was held on dc due to Hematuria.   Appreciate EP recs- Continue Diltiazem 60mg TID. Advise  Eliquis 5mg po BID for thromboembolic ppx  #DVT prox- on  Lovenox . Transition to Eliquis on dc   DW CM- Plan for home IV ABX set up.   Will need PICC  line prior to dc   DC Planning 33
Seen at bedside.  Site improving.  MRI pending.  Likely plan for local wound care.
67 year old male with history of lymphedema, pneumonia (hospitalized 2019, c/b pAF with spontaneous conversion back to NSR), now presented with fever, dyspnea, cough, and diarrhea, here febrile to 101.5 with diffuse b/l GGOs on CT imaging. Cultures sent, started on antibiotics with CAP coverage. BCx now positive for MSSA, abx switched to ancef. Repeat cultures pending. Appreciate ID consultation. Remains in afib on tele, rates better controlled, suspect afib triggered by acute infection. TTE grossly unremarkable, TSH pending. [id number] score of 1, currently on heparin drip, c/w risks/benefit discussions of AC. Also with new right knee pain, ill defined fluid seen on US, will further d/w ID regarding suspicion for septic joint, consider Ortho evaluation. Also with right foot bullae, now s/p I&D by podiatry, culture sent, awaiting foot MRI.
67 year old male with history of lymphedema, pneumonia (hospitalized 2019, c/b pAF with spontaneous conversion back to NSR), now presented with fever, dyspnea, cough, and diarrhea, here febrile to 101.5 with diffuse b/l GGOs on CT imaging. Cultures sent, started on antibiotics with CAP coverage. BCx now positive for MSSA, abx switched to ancef. Repeat cultures pending. Appreciate ID consultation. Remains in afib on tele with rates ~100, suspect triggered by acute infection. TTE grossly unremarkable, TSH to be collected. C/w tele. [id number] score of 1, currently on heparin drip, c/w risks/benefit discussions of AC.
67 y/M PMH  of lymphedema, pneumonia (hospitalized 2019, c/b pAF presented with fever, dyspnea, cough, and diarrhea, here febrile to 101.5 with diffuse b/l GGOs on CT imaging.   Pt seen and examined by me. No new events. Waiting to go home today, Waiting for his ride     # Sepsis likely secondary to  multifocal gram negative PNA/ MSSA bacteremia- RESOLVED   BCx 10/10 positive for MSSA. Repeat cultures from 10/12 positive. BCx 10/15: NGTD   CT chest 10/11 w/ diffuse GGO. Legionella negative.  Psittacosis serology-IG G +, but IgM negative- not suggestive of acute infection. Urine histoplasmosis - negative  ID on board- Advise to check OMI. Pt refusing OMI despite explaining reason for OMI.   Appreciate ID recs- “If pt refuses OMI, plan on cefazolin 2 gram iv q 8 through 11/12/2022. Weekly cbc, bmp fax to [phone]. Repeat TTE with his pmd prior to end of antibiotic course”. FU outpt      # Right foot bullae- present on admission, now s/p I&D by podiatry. Wound Cx 10/12: Many S. aureus  L foot s/p bullae deroofed on 10/12,  mild serosanguineous drainage, no pus. L foot wound.  MRI foot reviewed-Soft tissue ulcer along the plantar aspect of the third toe with   surrounding cellulitis. No drainable fluid collection. Osseous edema  within the base of the second proximal phalanx and within the fragmented second metatarsal head, which may be posttraumatic or be related to septic arthritis/osteomyelitis.  As per Podiatry-Follow this as an outpt.     #History of paroxysmal afib- Episode of RVR to 130 on 10/17   Started on Diltiazem, HR in  now rates better controlled. TTE grossly unremarkable, TSH wnl.  [id number] score of 1 ( Age >65, no history of DM, HTN, TIA/CVA or HF).   Currently on Lovenox. Was started on Eliquis in 2019 by EP but was held on dc due to Hematuria.   Appreciate EP recs- Continue Diltiazem 60mg TID. Advise  Eliquis 5mg po BID for thromboembolic ppx  Discussed with pt risks/benefits of AC- agreeable to AC    #DVT prox- on  Lovenox . Transition to Eliquis on dc.   DW CM- Dc home with home care.   Fu appointment made with [facility].   Pt advised he will need a FU ECHO prior to completion of f his Abx, and his FU of his labs and repeat imaging    DC Planning 33 mins.
67 year old male with history of lymphedema, pneumonia (hospitalized 2019, c/b pAF with spontaneous conversion back to NSR), now presented with fever, dyspnea, cough, and diarrhea, here febrile to 101.5 with diffuse b/l GGOs on CT imaging. Cultures sent, started on antibiotics with CAP coverage. BCx now positive for MSSA, abx switched to ancef plus levaquin, per ID. Repeat cultures from 10/12 also positive. Repeating BCx q48hrs until clear. Intermittently with right knee pain, septic arthritis considered but felt to be less likely given lack of warmth, full ROM, and minimal pain on exam, low threshold for Ortho evaluation if suspicion rises. Right foot bullae also present on admission, now s/p I&D by podiatry, culture sent, MRI foot pending. Patient also with history of paroxysmal afib, noticed during prior hospitalization with suspected trigger of infection at that time, here again with afib, now rates better controlled. TTE grossly unremarkable, TSH wnl. [id number] score of 1, currently on heparin drip, c/w risks/benefit discussions of AC.
67 y/M PMH  of lymphedema, pneumonia (hospitalized 2019, c/b pAF presented with fever, dyspnea, cough, and diarrhea, here febrile to 101.5 with diffuse b/l GGOs on CT imaging.   Pt seen and examined by me. No new events. Happy that he is going home. Awaitig PICC     # Sepsis likely secondary to  multifocal gram negative PNA/ MSSA bacteremia- RESOLVED   BCx 10/10 positive for MSSA. Repeat cultures from 10/12 positive. BCx 10/15: NGTD   CT chest 10/11 w/ diffuse GGO. Legionella negative.  Psittacosis serology-IG G +, but IgM negative- not suggestive of acute infection.   ID on board- Advise to check OMI, Check urine histoplasmosis. Pt refusing OMI despite explaining reason for OMI.   Appreciate ID recs- “If pt refuses OMI, plan on cefazolin 2 gram iv q 8 through 11/12/2022. Weekly cbc, bmp fax to [phone]. Repeat TTE with his pmd prior to end of antibiotic course” f/u histoplasma urine antigen- still pending . FU outpt      # Right foot bullae- present on admission, now s/p I&D by podiatry. Wound Cx 10/12: Many S. aureus  L foot s/p bullae deroofed on 10/12,  mild serosanguineous drainage, no pus. L foot wound.  MRI foot reviewed-Soft tissue ulcer along the plantar aspect of the third toe with   surrounding cellulitis. No drainable fluid collection. Osseous edema  within the base of the second proximal phalanx and within the fragmented second metatarsal head, which may be posttraumatic or be related to septic arthritis/osteomyelitis.  As per Podiatry-Follow this as an outpt.     #History of paroxysmal afib- Episode of RVR to 130 on 10/17   Started on Diltiazem, HR in  now rates better controlled. TTE grossly unremarkable, TSH wnl.  [id number] score of 1 ( Age >65, no history of DM, HTN, TIA/CVA or HF).   Currently on Lovenox. Was started on Eliquis in 2019 by EP but was held on dc due to Hematuria.   Appreciate EP recs- Continue Diltiazem 60mg TID. Advise  Eliquis 5mg po BID for thromboembolic ppx  Discussed with pt risks/benefits of AC- agreeable to AC    #DVT prox- on  Lovenox . Transition to Eliquis on dc.   DW CM- Dc home with home care. Await PICC placement    Email sent to [facility] for appointment, pt reports he received a call for appointment   Pt advised he will need a FU ECHO prior to completion of f his Abx, and his FU of his labs- Histoplasmosis which is pending and repeat imaging    DC Planning 33 mins
67 y/M PMH  of lymphedema, pneumonia (hospitalized 2019, c/b pAF with spontaneous conversion back to NSR), now presented with fever, dyspnea, cough, and diarrhea, here febrile to 101.5 with diffuse b/l GGOs on CT imaging.   # Sepsis likely secondary to  multifocal PNA/ MSSA bacteremia  BCx 10/10 positive for MSSA.Repeat cultures from 10/12 also positive  BCx 10/15: NGTD   CT chest 10/11 w/ diffuse GGO  Legionella negative. f/u histoplasma urine antigen   Chlamydia pneumonia IgG +, otherwise chlamydia labs negative   ID on board- Advise to check OMI, Check urine histoplasmosis, check psittacosis serology- Pt refusing OMI despite explaining reason for OMI.      # Right  knee pain- Resolved. No pain or swelling or erythema notes.  # Right foot bullae- present on admission, now s/p I&D by podiatry  Wound Cx 10/12: Many S. aureus  Appreciate Podiatry note- L foot s/p bullae deroofed on 10/12,  mild serosanguineous drainage, no pus. L foot wound. L foot MR pending. Plan local wound care vs left foot bone biopsy pending MRI.    #History of paroxysmal afib, noticed during prior hospitalization with suspected trigger of infection at that time, here again with afib, now rates better controlled. TTE grossly unremarkable, TSH wnl.   [id number] score of 1 ( Age >65, no history of DM, HTN, TIA/CVA or HF). Currently on Lovenox. Was started on Eliquis in 2019 by EP but was held on dc due to Hematuria.  [facility] EP- FU recs  #DVT prox- on  Lovenox
67 year old man with history of lymphedema, pneumonia (hospitalized 2019, c/b pAF with spontaneous conversion back to NSR), now presented with fever, dyspnea, cough, and diarrhea, here febrile to 101.5 with diffuse b/l GGOs on CT imaging. Cultures sent, started on antibiotics with CAP coverage. BCx now positive for MSSA, abx switched to ancef plus levaquin, per ID. Repeat cultures from 10/12 also positive. Repeating BCx q48hrs until clear. Intermittently with right knee pain, septic arthritis considered but felt to be less likely given lack of warmth, full ROM, and minimal pain on exam, low threshold for Ortho evaluation if suspicion rises. Right foot bullae also present on admission, now s/p I&D by podiatry, culture sent, MRI foot pending. Patient also with history of paroxysmal afib, noticed during prior hospitalization with suspected trigger of infection at that time, here again with afib, now rates better controlled. TTE grossly unremarkable, TSH wnl. [id number] score of 1, currently on heparin drip, c/w risks/benefit discussions of AC.
67 year old male with history of lymphedema, pneumonia (hospitalized 2019, c/b pAF with spontaneous conversion back to NSR), now presented with fever, dyspnea, cough, and diarrhea, here febrile to 101.5 with diffuse b/l GGOs on CT imaging. Cultures sent, started on antibiotics with CAP coverage. BCx now positive for MSSA, abx switched to ancef plus levaquin, per ID. Repeat cultures from 10/12 also positive. Repeating BCx q48hrs until clear. Intermittently with right knee pain, septic arthritis considered but felt to be less likely given lack of warmth, full ROM, and minimal pain on exam, low threshold for Ortho evaluation if suspicion rises. Right foot bullae also present on admission, now s/p I&D by podiatry, culture sent, MRI foot pending. Patient also with history of paroxysmal afib, noticed during prior hospitalization with suspected trigger of infection at that time, here again with afib, now rates better controlled. TTE grossly unremarkable, TSH wnl. [id number] score of 1, currently on heparin drip, c/w risks/benefit discussions of AC.

## 2022-10-20 NOTE — PROGRESS NOTE ADULT - PROBLEM SELECTOR PLAN 3
History of lymphedema treated by podiatry and [facility] vascular surgery. Patient notes redness of toe comes and goes, with swelling and warmth of toe 3 days prior to admission that is resolving.  - appreciate podiatry recs, possible source of infection given MSSA bacteremia  - c/w cefazolin iv q8 through 11/12/2022, will need echo prior to end of antibiotic course  - PICC placed today, 10/19  - Wound Cx: many S. aureus  - MRI L foot shows "osseous edema with areas of T1 marrow signal abnormality within the base of the second proximal phalanx and within the fragmented second metatarsal head, which may be posttraumatic or be related to septic arthritis/osteomyelitis"

## 2022-10-20 NOTE — PROGRESS NOTE ADULT - ASSESSMENT
67y Male with L foot submet 3 wound to capsule  - Patient seen and evaluated.  - Afebrile, WBC 7  - R foot no wounds, no acute signs of infection. L foot wound at submet 3 area to subQ with tracking sinus to 3rd MPJ capsule, tracking noted distally ~1cm, mild serosanguineous drainage, no pus.   - L foot MR: post traumatic vs septic arthritis/OM of 2nd proximal phalanx, osseous edema of 1st/2nd DP (inconsistent clinically as patient has a localized wound submet 3 of the L foot)  - L foot wound culture: MSSA (final).  - ID recs, appreciated  - Patient is stable for  from podiatry standpoint discharge   - F/u information and instructions can be found in the f/u section of d/c provider note   - Discussed with attending.

## 2022-10-20 NOTE — PROGRESS NOTE ADULT - PROBLEM SELECTOR PLAN 5
DVT PPx: lovenox therapeutic  Diet: regular  Dispo: home, most likely tomorrow 10/20 DVT PPx: lovenox therapeutic  Diet: regular  Dispo: home, discharged today, 10/20

## 2022-11-01 ENCOUNTER — APPOINTMENT (OUTPATIENT)
Dept: INTERNAL MEDICINE | Facility: CLINIC | Age: 67
End: 2022-11-01

## 2022-11-02 ENCOUNTER — APPOINTMENT (OUTPATIENT)
Dept: INTERNAL MEDICINE | Facility: CLINIC | Age: 67
End: 2022-11-02

## 2022-11-02 VITALS
OXYGEN SATURATION: 95 % | TEMPERATURE: 98.1 F | WEIGHT: 208 LBS | HEART RATE: 73 BPM | BODY MASS INDEX: 33.03 KG/M2 | DIASTOLIC BLOOD PRESSURE: 81 MMHG | SYSTOLIC BLOOD PRESSURE: 159 MMHG | HEIGHT: 66.5 IN

## 2022-11-02 DIAGNOSIS — Z00.00 ENCOUNTER FOR GENERAL ADULT MEDICAL EXAMINATION W/OUT ABNORMAL FINDINGS: ICD-10-CM

## 2022-11-02 DIAGNOSIS — Z82.5 FAMILY HISTORY OF ASTHMA AND OTHER CHRONIC LOWER RESPIRATORY DISEASES: ICD-10-CM

## 2022-11-02 DIAGNOSIS — Z87.891 PERSONAL HISTORY OF NICOTINE DEPENDENCE: ICD-10-CM

## 2022-11-02 DIAGNOSIS — Z87.01 PERSONAL HISTORY OF PNEUMONIA (RECURRENT): ICD-10-CM

## 2022-11-02 PROCEDURE — 99387 INIT PM E/M NEW PAT 65+ YRS: CPT

## 2022-11-02 RX ORDER — ASCORBIC ACID/MULTIVIT-MIN 1000 MG
EFFERVESCENT POWDER IN PACKET ORAL
Refills: 0 | Status: ACTIVE | COMMUNITY
Start: 2022-11-02

## 2022-11-02 RX ORDER — VITAMIN B COMPLEX
CAPSULE ORAL
Refills: 0 | Status: ACTIVE | COMMUNITY
Start: 2022-11-02

## 2022-11-02 RX ORDER — CYANOCOBALAMIN (VITAMIN B-12) 500 MCG
400 LOZENGE ORAL
Refills: 0 | Status: ACTIVE | COMMUNITY
Start: 2022-11-02

## 2022-11-02 NOTE — HEALTH RISK ASSESSMENT
[Former] : Former [5-9] : 5-9 [Yes] : Yes [4 or more  times a week (4 pts)] : 4 or more  times a week (4 points) [1 or 2 (0 pts)] : 1 or 2 (0 points) [Never (0 pts)] : Never (0 points) [No] : In the past 12 months have you used drugs other than those required for medical reasons? No [0] : 2) Feeling down, depressed, or hopeless: Not at all (0) [PHQ-2 Negative - No further assessment needed] : PHQ-2 Negative - No further assessment needed [With Family] : lives with family [Employed] : employed [] :  [Fully functional (bathing, dressing, toileting, transferring, walking, feeding)] : Fully functional (bathing, dressing, toileting, transferring, walking, feeding) [Fully functional (using the telephone, shopping, preparing meals, housekeeping, doing laundry, using] : Fully functional and needs no help or supervision to perform IADLs (using the telephone, shopping, preparing meals, housekeeping, doing laundry, using transportation, managing medications and managing finances) [YearQuit] : 1985 [de-identified] : TSA, lots of agents [de-identified] : 2 meals, fresh fruits. Little fried foods [[id number]] : 0 [Sexually Active] : not sexually active [Reports changes in hearing] : Reports no changes in hearing [Reports changes in vision] : Reports no changes in vision [Reports changes in dental health] : Reports no changes in dental health [FreeTextEntry2] : TSA

## 2022-11-02 NOTE — ASSESSMENT
[FreeTextEntry1] : HCM:\par - CXR in 2 months.\par - Discussed colonoscopy v. FIT test. Colonoscopy benefits include immediate polyp removal and not having to re-test for 10yrs v. convenience of yearly stool test. Discussed w/ pt if stool test is positive that they will have to get a colonoscopy anyways. Opted for FIT test\par - Will need lipids, defer currently. Will obtain with repeat labs in 2 months.\par - COVID vaccinated\par - PCV20 deferred in the setting of active PNA treatment\par - Shingrix recommended, advised to obtain after abx complete.\par \par RTC 2 months for f/u

## 2022-11-02 NOTE — HISTORY OF PRESENT ILLNESS
[de-identified] : Javier Washington is a 66yo M with PMhx of lymphedema and Afib (new dx) who presents to establish care.\par \par Admitted 10/11-10/20 with MSSA bacteremia. Course complicated by Afib, TTE normal. Discharged w/ Ancef through 11/12/22 and eliquis. \par Patient needs follow up imaging (CXR) for positive psittacosus IgG, per ID. Since discharge, he reports feeling well. Eating normal. \par \par HTN:\par 120-130 at home per patient report. Did not take his dilt today.\par \par a1c of 5.8%\par \par Meds:\par Dilt-ER 180mg\par Eliquis 5mg BID\par Vit E 400mg\par Vit B complex\par Emerg-C\par Probiotics\par \par Father: Emphysema\par Mother: COPD

## 2022-11-02 NOTE — DISCHARGE NOTE PROVIDER - HOSPITAL COURSE
- NPO for PVI ablation.  - Bedrest for four hours post-procedure.  - Xarelto tonight.   - Echo tomorrow for routine cardiac structure/function. Discharge Summary     Admission diagnoses:   ***    Discharge diagnoses:   ***    Hospital Course:   For full details, please see H&P, progress notes, consult notes and ancillary notes. Briefly, Javier Washington is a 67M history of lymphedema (s/p procedures per outpatient podiatry and vascular surgery) and admission for pneumonia in 2019 c/b spontaneously resolved atrial fibrillation presenting with 2 days of fever, cough, shortness of breath and 3 days of right toe erythema and swelling. CT with diffuse ground glass opacities initially concerning for multifocal pneumonia, started on ceftriaxone and azithromycin. Blood cultures with MSSA bacteremia. Abx switched to ancef and levaquin per Id. Right foot bullae deroofed per podiatry with S. aureus in wound culture. TTE was negative. OMI due to persistent bacteremia was ***. Patient is discharged on ***.    On day of discharge, patient is clinically stable with no new exam findings or acute symptoms compared to prior. The patient was seen by the attending physician on the date of discharge and deemed stable and acceptable for discharge. The patient's chronic medical conditions were treated accordingly per the patient's home medication regimen. The patient's medication reconciliation (with changes made to chronic medications), follow up appointments, discharge orders, instructions, and significant lab and diagnostic studies are as noted.     Discharge follow up action items:     1. Follow up with PCP in 1-2 weeks.   2. Medication changes ***    Patient's ordered code status: ***    Patient disposition: ***     Discharge Summary     Admission diagnoses:   Fever    Discharge diagnoses:   MSSA bacteremia    Hospital Course:   For full details, please see H&P, progress notes, consult notes and ancillary notes. Briefly, Javier Washington is a 67M history of lymphedema (s/p procedures per outpatient podiatry and vascular surgery) and admission for pneumonia in 2019 c/b spontaneously resolved atrial fibrillation presenting with 2 days of fever, cough, shortness of breath and 3 days of right toe erythema and swelling. CT with diffuse ground glass opacities initially concerning for multifocal pneumonia, started on ceftriaxone and azithromycin. Blood cultures with MSSA bacteremia. Abx switched to ancef and levaquin per Id. Right foot bullae deroofed per podiatry with S. aureus in wound culture. TTE was negative. OMI due to persistent bacteremia was ***. Patient is discharged on ***.    On day of discharge, patient is clinically stable with no new exam findings or acute symptoms compared to prior. The patient was seen by the attending physician on the date of discharge and deemed stable and acceptable for discharge. The patient's chronic medical conditions were treated accordingly per the patient's home medication regimen. The patient's medication reconciliation (with changes made to chronic medications), follow up appointments, discharge orders, instructions, and significant lab and diagnostic studies are as noted.     Discharge follow up action items:     1. Follow up with PCP in 1-2 weeks.   2. Medication changes ***    Patient's ordered code status: ***    Patient disposition: ***     Discharge Summary     Admission diagnoses:   Fever    Discharge diagnoses:   MSSA bacteremia    Hospital Course:   For full details, please see H&P, progress notes, consult notes and ancillary notes. Briefly, Javier Washington is a 67M history of lymphedema (s/p procedures per outpatient podiatry and vascular surgery) and admission for pneumonia in 2019 c/b spontaneously resolved atrial fibrillation presenting with 2 days of fever, cough, shortness of breath and 3 days of right toe erythema and swelling. CT with diffuse ground glass opacities initially concerning for multifocal pneumonia, started on ceftriaxone and azithromycin. Blood cultures with MSSA bacteremia. Abx switched to ancef which patient will need to receive till 11/12/22.  Received a total of 7 days of levaquin. Right foot bullae deroofed per podiatry with S. aureus in wound culture. TTE was negative. Patient refused to get OMI. Hospital course complicated with Afib, EP evaluated pt and started on diltiazem. Pt currently in NSR and will be discharged with Eliquis 5mg. Pt will need to get repeat TTE at the end of his abx course.     On day of discharge, patient is clinically stable with no new exam findings or acute symptoms compared to prior. The patient was seen by the attending physician on the date of discharge and deemed stable and acceptable for discharge. The patient's chronic medical conditions were treated accordingly per the patient's home medication regimen. The patient's medication reconciliation (with changes made to chronic medications), follow up appointments, discharge orders, instructions, and significant lab and diagnostic studies are as noted. PICC line placed and pt will receive IV abx till 11/12/22.        Discharge Summary     Admission diagnoses:   Fever    Discharge diagnoses:   MSSA bacteremia    Hospital Course:   For full details, please see H&P, progress notes, consult notes and ancillary notes. Briefly, Javier Washington is a 67M history of lymphedema (s/p procedures per outpatient podiatry and vascular surgery) and admission for pneumonia in 2019 c/b spontaneously resolved atrial fibrillation presenting with 2 days of fever, cough, shortness of breath and 3 days of right toe erythema and swelling. CT with diffuse ground glass opacities initially concerning for multifocal pneumonia, started on ceftriaxone and azithromycin. Blood cultures with MSSA bacteremia. Abx switched to ancef which patient will need to receive till 11/12/22.  Received a total of 7 days of levaquin. Right foot bullae deroofed per podiatry with S. aureus in wound culture. TTE was negative. Patient refused to get OMI. Hospital course complicated with Afib, EP evaluated pt and started on diltiazem. Pt currently in NSR and will be discharged with Eliquis 5mg. Pt will need to get repeat TTE at the end of his abx course.     On day of discharge, patient is clinically stable with no new exam findings or acute symptoms compared to prior. The patient was seen by the attending physician on the date of discharge and deemed stable and acceptable for discharge. The patient's chronic medical conditions were treated accordingly per the patient's home medication regimen. The patient's medication reconciliation (with changes made to chronic medications), follow up appointments, discharge orders, instructions, and significant lab and diagnostic studies are as noted. PICC line placed and pt will receive IV abx till 11/12/22. 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

## 2022-11-02 NOTE — PHYSICAL EXAM
[No Acute Distress] : no acute distress [Well Nourished] : well nourished [Well Developed] : well developed [Well-Appearing] : well-appearing [Normal Sclera/Conjunctiva] : normal sclera/conjunctiva [PERRL] : pupils equal round and reactive to light [EOMI] : extraocular movements intact [Normal Outer Ear/Nose] : the outer ears and nose were normal in appearance [Normal Oropharynx] : the oropharynx was normal [No JVD] : no jugular venous distention [No Lymphadenopathy] : no lymphadenopathy [Supple] : supple [No Respiratory Distress] : no respiratory distress  [No Accessory Muscle Use] : no accessory muscle use [Normal Rate] : normal rate  [Regular Rhythm] : with a regular rhythm [Normal S1, S2] : normal S1 and S2 [No Murmur] : no murmur heard [Soft] : abdomen soft [Non Tender] : non-tender [Non-distended] : non-distended [No Masses] : no abdominal mass palpated [Grossly Normal Strength/Tone] : grossly normal strength/tone [Normal Affect] : the affect was normal [Normal Insight/Judgement] : insight and judgment were intact [de-identified] : IMpacted cerumen left ear, right ear normal [de-identified] : ?rales in the right lower lobe [de-identified] : +1 pitting edema bilaterally [de-identified] : No ulcer appreciated on bottom of left foot [de-identified] : No facial asymmetry. Strength equal in the upper and lower extremities. Finger-to-nose normal, heel-to-shin normal

## 2022-11-02 NOTE — REVIEW OF SYSTEMS
[Cough] : cough [Fever] : no fever [Chills] : no chills [Chest Pain] : no chest pain [Shortness Of Breath] : no shortness of breath [Constipation] : no constipation [Diarrhea] : no diarrhea [Headache] : no headache [Dizziness] : no dizziness

## 2022-11-11 ENCOUNTER — APPOINTMENT (OUTPATIENT)
Dept: INFECTIOUS DISEASE | Facility: CLINIC | Age: 67
End: 2022-11-11

## 2022-11-11 ENCOUNTER — NON-APPOINTMENT (OUTPATIENT)
Age: 67
End: 2022-11-11

## 2022-11-11 ENCOUNTER — APPOINTMENT (OUTPATIENT)
Dept: ELECTROPHYSIOLOGY | Facility: CLINIC | Age: 67
End: 2022-11-11

## 2022-11-11 VITALS
BODY MASS INDEX: 33.07 KG/M2 | HEIGHT: 66.5 IN | SYSTOLIC BLOOD PRESSURE: 120 MMHG | HEART RATE: 84 BPM | DIASTOLIC BLOOD PRESSURE: 82 MMHG | OXYGEN SATURATION: 97 % | TEMPERATURE: 98 F

## 2022-11-11 VITALS — WEIGHT: 208 LBS | BODY MASS INDEX: 33.07 KG/M2

## 2022-11-11 VITALS
BODY MASS INDEX: 33.03 KG/M2 | DIASTOLIC BLOOD PRESSURE: 80 MMHG | WEIGHT: 208 LBS | SYSTOLIC BLOOD PRESSURE: 137 MMHG | HEIGHT: 66.5 IN | HEART RATE: 78 BPM | OXYGEN SATURATION: 98 % | TEMPERATURE: 98 F

## 2022-11-11 PROCEDURE — 99214 OFFICE O/P EST MOD 30 MIN: CPT

## 2022-11-11 PROCEDURE — 99213 OFFICE O/P EST LOW 20 MIN: CPT | Mod: 25

## 2022-11-11 PROCEDURE — 93000 ELECTROCARDIOGRAM COMPLETE: CPT

## 2022-11-11 RX ORDER — CLOTRIMAZOLE AND BETAMETHASONE DIPROPIONATE 10; .5 MG/G; MG/G
1-0.05 CREAM TOPICAL
Qty: 45 | Refills: 0 | Status: DISCONTINUED | COMMUNITY
Start: 2022-06-08

## 2022-11-11 RX ORDER — OXYCODONE AND ACETAMINOPHEN 5; 325 MG/1; MG/1
5-325 TABLET ORAL
Qty: 28 | Refills: 0 | Status: DISCONTINUED | COMMUNITY
Start: 2022-05-19

## 2022-11-11 RX ORDER — LOSARTAN POTASSIUM 100 MG/1
100 TABLET, FILM COATED ORAL
Qty: 30 | Refills: 0 | Status: DISCONTINUED | COMMUNITY
Start: 2022-05-19

## 2022-11-11 NOTE — DISCUSSION/SUMMARY
[EKG obtained to assist in diagnosis and management of assessed problem(s)] : EKG obtained to assist in diagnosis and management of assessed problem(s) [FreeTextEntry1] : H/o PAF.  Will obtain day monitor to check AF burden.  If no AF consider ILR to check for long term AF burden to determine anticoagulation regimen. For now continue diltiazem and Eliquis. \par \par Recent MSSA infection.  Refused OMI will be getting TTE scheduling now. \par \par Lymphema: likely hereditary and also employment required standing.

## 2022-11-11 NOTE — ASSESSMENT
[FreeTextEntry1] : 67 year old with history of left foot infection with  pulmonary opacities and MSSA bacteremia. \par \par 1) MSSA bacteremia\par cefazolin 2 gram iv q 8\par Blood culture from 10/15 without growth\par \par S/p 4 weeks of cefazolin\par Repeat echo with cardiology\par \par take keflex for 14 additional days\par \par 2) Follow up with podiatry re changes in imaging\par \par 3) Abnormal chest imaging\par S/p 5 days of Levaquin\par Repeat chest CT next month\par \par

## 2022-11-11 NOTE — HISTORY OF PRESENT ILLNESS
[FreeTextEntry1] : Mr. CHICO WICK is a 67 year old with a history of HTN, , lymphedema, MSSA bacteremia,pneumonia in 2019,  PAF and is now here for management of atrial fibrillation.  Recently hospitalized from October 11 to October 20, 2022 for dyspnea and fever and found to have MSSA bacteremia.  He was s/p procedures per outpatient podiatry and vascular surgery).  Patient had 2 days of fever, cough, shortness of breath and 3 days of right toe erythema and swelling. CT with diffuse ground glass opacities initially concerning for multifocal pneumonia, started on ceftriaxone and azithromycin. Blood cultures with MSSA bacteremia. Abx switched to ancef which patient will need to receive till 11/12/22.  Received a total of 7 days of Levaquin. Right foot bullae deroofed per podiatry with S. aureus in wound culture. TTE was negative. Patient refused to get OMI. Hospital course complicated with Afib, EP evaluated pt and started on diltiazem. Patient spontaneously converted to NSR and was discharged with Eliquis 5mg BID. Pt will need to get repeat TTE at the end of his abx course. PICC line placed and pt will receive IV abx till 11/12/22. 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 When stressed he notes his heart rate go up (manifests as cough and chest pressure), and his watch shows elevated heart rate. Feels funny in chest when exerts himself. No syncope, no dizziness.

## 2022-11-11 NOTE — HISTORY OF PRESENT ILLNESS
[FreeTextEntry1] : 67 year old with history of left foot previously admitted with fever, cough and shortness of breath.  Found to have new pulmonary opacities and MSSA bacteremia. \par \par Blood culture positive forr MSSA on 10/12. Cleared on 10/15. OMI was limitted - refused OMI. \par Plan for cefazolin through 11/12. \par \par Ct had nonspecific opacities in the chest. \par \par Denies SOB. No cough. \par Denies chest pain\par No fever\par \par

## 2022-11-11 NOTE — PHYSICAL EXAM
[General Appearance - Alert] : alert [General Appearance - In No Acute Distress] : in no acute distress [Sclera] : the sclera and conjunctiva were normal [Extraocular Movements] : extraocular movements were intact [Outer Ear] : the ears and nose were normal in appearance [Hearing Threshold Finger Rub Not [place]] : hearing was normal [Neck Appearance] : the appearance of the neck was normal [Neck Cervical Mass (___cm)] : no neck mass was observed [Exaggerated Use Of Accessory Muscles For Inspiration] : no accessory muscle use [Heart Rate And Rhythm] : heart rate was normal and rhythm regular [Heart Sounds] : normal S1 and S2 [Full Pulse] : the pedal pulses are present [Bowel Sounds] : normal bowel sounds [Abdomen Soft] : soft [Abdomen Tenderness] : non-tender [No Palpable Adenopathy] : no palpable adenopathy [Skin Color & Pigmentation] : normal skin color and pigmentation [] : no rash [Oriented To Time, Place, And Person] : oriented to person, place, and time [Affect] : the affect was normal

## 2022-11-14 ENCOUNTER — NON-APPOINTMENT (OUTPATIENT)
Age: 67
End: 2022-11-14

## 2022-11-15 ENCOUNTER — OUTPATIENT (OUTPATIENT)
Dept: OUTPATIENT SERVICES | Facility: HOSPITAL | Age: 67
LOS: 1 days | End: 2022-11-15

## 2022-11-15 ENCOUNTER — APPOINTMENT (OUTPATIENT)
Dept: CV DIAGNOSITCS | Facility: HOSPITAL | Age: 67
End: 2022-11-15

## 2022-11-15 DIAGNOSIS — R78.81 BACTEREMIA: ICD-10-CM

## 2022-11-15 PROCEDURE — 93306 TTE W/DOPPLER COMPLETE: CPT | Mod: 26

## 2023-01-01 ENCOUNTER — RX RENEWAL (OUTPATIENT)
Age: 68
End: 2023-01-01

## 2023-01-10 ENCOUNTER — APPOINTMENT (OUTPATIENT)
Dept: INTERNAL MEDICINE | Facility: CLINIC | Age: 68
End: 2023-01-10
Payer: COMMERCIAL

## 2023-01-10 VITALS
HEIGHT: 66.5 IN | OXYGEN SATURATION: 96 % | HEART RATE: 74 BPM | DIASTOLIC BLOOD PRESSURE: 70 MMHG | BODY MASS INDEX: 32.4 KG/M2 | SYSTOLIC BLOOD PRESSURE: 150 MMHG | TEMPERATURE: 97.6 F | WEIGHT: 204 LBS

## 2023-01-10 PROCEDURE — 99214 OFFICE O/P EST MOD 30 MIN: CPT

## 2023-01-10 RX ORDER — CEPHALEXIN 500 MG/1
500 CAPSULE ORAL EVERY 6 HOURS
Qty: 56 | Refills: 0 | Status: DISCONTINUED | COMMUNITY
Start: 2022-11-11 | End: 2023-01-10

## 2023-01-10 RX ORDER — FLUTICASONE PROPIONATE 50 UG/1
50 SPRAY, METERED NASAL TWICE DAILY
Qty: 1 | Refills: 0 | Status: ACTIVE | COMMUNITY
Start: 2023-01-10 | End: 1900-01-01

## 2023-01-10 NOTE — ASSESSMENT
[FreeTextEntry1] : HCM:\par - CBC, CMP, lipids\par - PCV20 discussed, will obtain later\par - Shingrix recommended previously, advised to obtain after well\par \par RTC 4 months for follow-up

## 2023-01-10 NOTE — PHYSICAL EXAM
[No Acute Distress] : no acute distress [Well Nourished] : well nourished [Well Developed] : well developed [Well-Appearing] : well-appearing [Normal Sclera/Conjunctiva] : normal sclera/conjunctiva [Normal Outer Ear/Nose] : the outer ears and nose were normal in appearance [No Respiratory Distress] : no respiratory distress  [No Accessory Muscle Use] : no accessory muscle use [Clear to Auscultation] : lungs were clear to auscultation bilaterally [Normal Rate] : normal rate  [Regular Rhythm] : with a regular rhythm [Normal S1, S2] : normal S1 and S2 [No Murmur] : no murmur heard [Non-distended] : non-distended [Grossly Normal Strength/Tone] : grossly normal strength/tone [Normal Affect] : the affect was normal [Normal Insight/Judgement] : insight and judgment were intact [de-identified] : Answering questions appropriately. Gets on exam table and ambulates without assistance

## 2023-01-10 NOTE — HISTORY OF PRESENT ILLNESS
[de-identified] : Javier Washington is a 66yo M with PMhx of lymphedema and Afib who presents to follow-up\par \par 1/4 started having runny nose, myalgias. 1/5 began coughing but overall improving. Cough is intermittent productive of yellow/green sputum, and rarely brown. No SOB, maybe had transient wheeze at onset but otherwise no wheezing. Does have some chest discmofrt with extensive coughing (when he feels he can't get the sputum up). No fevers, rhinorrhea has resolved.\par \par Asking about target HR. Did not take diltiazem for 2 days (Ran out)

## 2023-01-11 LAB
ALBUMIN SERPL ELPH-MCNC: 4.3 G/DL
ALP BLD-CCNC: 81 U/L
ALT SERPL-CCNC: 19 U/L
ANION GAP SERPL CALC-SCNC: 11 MMOL/L
AST SERPL-CCNC: 19 U/L
BASOPHILS # BLD AUTO: 0.05 K/UL
BASOPHILS NFR BLD AUTO: 0.6 %
BILIRUB SERPL-MCNC: 0.4 MG/DL
BUN SERPL-MCNC: 19 MG/DL
CALCIUM SERPL-MCNC: 9.2 MG/DL
CHLORIDE SERPL-SCNC: 101 MMOL/L
CHOLEST SERPL-MCNC: 181 MG/DL
CO2 SERPL-SCNC: 26 MMOL/L
CREAT SERPL-MCNC: 0.83 MG/DL
EGFR: 97 ML/MIN/1.73M2
EOSINOPHIL # BLD AUTO: 0.41 K/UL
EOSINOPHIL NFR BLD AUTO: 5.2 %
GLUCOSE SERPL-MCNC: 152 MG/DL
HCT VFR BLD CALC: 41.6 %
HDLC SERPL-MCNC: 39 MG/DL
HGB BLD-MCNC: 13 G/DL
IMM GRANULOCYTES NFR BLD AUTO: 0.6 %
LDLC SERPL CALC-MCNC: 118 MG/DL
LYMPHOCYTES # BLD AUTO: 1.34 K/UL
LYMPHOCYTES NFR BLD AUTO: 16.8 %
MAN DIFF?: NORMAL
MCHC RBC-ENTMCNC: 29.9 PG
MCHC RBC-ENTMCNC: 31.3 GM/DL
MCV RBC AUTO: 95.6 FL
MONOCYTES # BLD AUTO: 0.63 K/UL
MONOCYTES NFR BLD AUTO: 7.9 %
NEUTROPHILS # BLD AUTO: 5.48 K/UL
NEUTROPHILS NFR BLD AUTO: 68.9 %
NONHDLC SERPL-MCNC: 142 MG/DL
PLATELET # BLD AUTO: 201 K/UL
POTASSIUM SERPL-SCNC: 4.3 MMOL/L
PROT SERPL-MCNC: 7.2 G/DL
RBC # BLD: 4.35 M/UL
RBC # FLD: 12.9 %
SODIUM SERPL-SCNC: 138 MMOL/L
TRIGL SERPL-MCNC: 124 MG/DL
WBC # FLD AUTO: 7.96 K/UL

## 2023-03-10 ENCOUNTER — RX RENEWAL (OUTPATIENT)
Age: 68
End: 2023-03-10

## 2023-03-28 ENCOUNTER — APPOINTMENT (OUTPATIENT)
Dept: INTERNAL MEDICINE | Facility: CLINIC | Age: 68
End: 2023-03-28
Payer: COMMERCIAL

## 2023-03-28 VITALS
DIASTOLIC BLOOD PRESSURE: 76 MMHG | TEMPERATURE: 97.9 F | OXYGEN SATURATION: 96 % | SYSTOLIC BLOOD PRESSURE: 147 MMHG | HEIGHT: 67 IN | BODY MASS INDEX: 32.49 KG/M2 | WEIGHT: 207 LBS | HEART RATE: 71 BPM

## 2023-03-28 DIAGNOSIS — B95.61 BACTEREMIA: ICD-10-CM

## 2023-03-28 DIAGNOSIS — R78.81 BACTEREMIA: ICD-10-CM

## 2023-03-28 DIAGNOSIS — M25.519 PAIN IN UNSPECIFIED SHOULDER: ICD-10-CM

## 2023-03-28 DIAGNOSIS — R73.03 PREDIABETES.: ICD-10-CM

## 2023-03-28 PROCEDURE — 99214 OFFICE O/P EST MOD 30 MIN: CPT

## 2023-03-28 NOTE — PHYSICAL EXAM
[No Acute Distress] : no acute distress [Well Nourished] : well nourished [Well Developed] : well developed [Well-Appearing] : well-appearing [Normal Sclera/Conjunctiva] : normal sclera/conjunctiva [PERRL] : pupils equal round and reactive to light [Normal Outer Ear/Nose] : the outer ears and nose were normal in appearance [No Respiratory Distress] : no respiratory distress  [No Accessory Muscle Use] : no accessory muscle use [Clear to Auscultation] : lungs were clear to auscultation bilaterally [Normal Rate] : normal rate  [Regular Rhythm] : with a regular rhythm [Normal S1, S2] : normal S1 and S2 [No Murmur] : no murmur heard [Non-distended] : non-distended [Grossly Normal Strength/Tone] : grossly normal strength/tone [Normal Affect] : the affect was normal [Normal Insight/Judgement] : insight and judgment were intact [de-identified] : Mild nystagmus bilaterally [de-identified] : Cerumen in the left ear, right TM normal [de-identified] : No facial asymmetry. Strength equal in the upper and lower extremities. Finger-to-nose normal, heel-to-shin normal

## 2023-03-28 NOTE — HISTORY OF PRESENT ILLNESS
[de-identified] : Javier Washington is a 69yo M with PMhx of lymphedema and Afib who presents for dizziness.\par \par Dizziness:\par A few days ago was cooking, then felt dizzy after with turning his head to right. Sx resolved. 2-3 days later sx recurred again with turning head to right. Has had 3 episodes in total, lasting for a few seconds. Notes he wasn't drinking enough water or eating regularly. No associated SOB but did note his HR was on the lower side during these episodes low to mid 60s. Had some increased urinary frequency but reports this resolved. Wonders if this is related to his poor posture\par \par Multiple questions about afib, HTN, lipids

## 2023-03-29 LAB
ALBUMIN SERPL ELPH-MCNC: 4.6 G/DL
ALP BLD-CCNC: 71 U/L
ALT SERPL-CCNC: 17 U/L
ANION GAP SERPL CALC-SCNC: 11 MMOL/L
AST SERPL-CCNC: 21 U/L
BASOPHILS # BLD AUTO: 0.05 K/UL
BASOPHILS NFR BLD AUTO: 0.6 %
BILIRUB SERPL-MCNC: 0.8 MG/DL
BUN SERPL-MCNC: 20 MG/DL
CALCIUM SERPL-MCNC: 9.4 MG/DL
CHLORIDE SERPL-SCNC: 104 MMOL/L
CO2 SERPL-SCNC: 25 MMOL/L
CREAT SERPL-MCNC: 0.85 MG/DL
EGFR: 95 ML/MIN/1.73M2
EOSINOPHIL # BLD AUTO: 0.36 K/UL
EOSINOPHIL NFR BLD AUTO: 4.5 %
ESTIMATED AVERAGE GLUCOSE: 111 MG/DL
GLUCOSE SERPL-MCNC: 95 MG/DL
HBA1C MFR BLD HPLC: 5.5 %
HCT VFR BLD CALC: 45.3 %
HGB BLD-MCNC: 14.2 G/DL
IMM GRANULOCYTES NFR BLD AUTO: 0.4 %
LYMPHOCYTES # BLD AUTO: 1.4 K/UL
LYMPHOCYTES NFR BLD AUTO: 17.5 %
MAN DIFF?: NORMAL
MCHC RBC-ENTMCNC: 30.3 PG
MCHC RBC-ENTMCNC: 31.3 GM/DL
MCV RBC AUTO: 96.8 FL
MONOCYTES # BLD AUTO: 0.76 K/UL
MONOCYTES NFR BLD AUTO: 9.5 %
NEUTROPHILS # BLD AUTO: 5.41 K/UL
NEUTROPHILS NFR BLD AUTO: 67.5 %
PLATELET # BLD AUTO: 189 K/UL
POTASSIUM SERPL-SCNC: 4.5 MMOL/L
PROT SERPL-MCNC: 7.5 G/DL
RBC # BLD: 4.68 M/UL
RBC # FLD: 13.5 %
SODIUM SERPL-SCNC: 140 MMOL/L
WBC # FLD AUTO: 8.01 K/UL

## 2023-05-02 ENCOUNTER — APPOINTMENT (OUTPATIENT)
Dept: INTERNAL MEDICINE | Facility: CLINIC | Age: 68
End: 2023-05-02
Payer: COMMERCIAL

## 2023-05-02 ENCOUNTER — APPOINTMENT (OUTPATIENT)
Dept: INTERNAL MEDICINE | Facility: CLINIC | Age: 68
End: 2023-05-02

## 2023-05-02 VITALS
HEART RATE: 74 BPM | SYSTOLIC BLOOD PRESSURE: 122 MMHG | HEIGHT: 67 IN | OXYGEN SATURATION: 97 % | BODY MASS INDEX: 32.33 KG/M2 | DIASTOLIC BLOOD PRESSURE: 68 MMHG | TEMPERATURE: 97.8 F | WEIGHT: 206 LBS

## 2023-05-02 DIAGNOSIS — J30.9 ALLERGIC RHINITIS, UNSPECIFIED: ICD-10-CM

## 2023-05-02 PROCEDURE — 99213 OFFICE O/P EST LOW 20 MIN: CPT

## 2023-05-02 RX ORDER — OLOPATADINE HCL 1 MG/ML
0.1 SOLUTION/ DROPS OPHTHALMIC
Qty: 1 | Refills: 5 | Status: ACTIVE | COMMUNITY
Start: 2023-05-02 | End: 1900-01-01

## 2023-05-02 NOTE — ASSESSMENT
[FreeTextEntry1] : Pt w URI possibly viral, can't r/o Covid which may present w Conjunctivitis.\par DDx also incl  Seasonal allergies.\par Will do Resp swab\par send in Patanol drops\par Allegra-d bid pending swab 24 hrs.

## 2023-05-02 NOTE — HISTORY OF PRESENT ILLNESS
[FreeTextEntry8] : 69 yo man c/o 1 wk of URI sxs:\par hoarse voice, raspy throat, congestion, itchy eyes-red, watery w some yellow mucous this am. No fever/chills,cough.\par Hasn't tried any antihistamines or allergy meds. Used visine without relief.\par h/o MSSA bacteremia 10/22, hosp, complic by Afib; he  f/u w ID has been ok.\par h/o lymphedema, dizziness.\par

## 2023-05-02 NOTE — PHYSICAL EXAM
[No Acute Distress] : no acute distress [Well Nourished] : well nourished [Well Developed] : well developed [Well-Appearing] : well-appearing [Normal Sclera/Conjunctiva] : normal sclera/conjunctiva [PERRL] : pupils equal round and reactive to light [EOMI] : extraocular movements intact [Normal Outer Ear/Nose] : the outer ears and nose were normal in appearance [Normal Oropharynx] : the oropharynx was normal [Normal TMs] : both tympanic membranes were normal [No JVD] : no jugular venous distention [No Lymphadenopathy] : no lymphadenopathy [Supple] : supple [Thyroid Normal, No Nodules] : the thyroid was normal and there were no nodules present [No Respiratory Distress] : no respiratory distress  [No Accessory Muscle Use] : no accessory muscle use [Clear to Auscultation] : lungs were clear to auscultation bilaterally [Normal Rate] : normal rate  [Regular Rhythm] : with a regular rhythm [Normal S1, S2] : normal S1 and S2 [No Murmur] : no murmur heard [No Carotid Bruits] : no carotid bruits [No Abdominal Bruit] : a ~M bruit was not heard ~T in the abdomen [No Varicosities] : no varicosities [Pedal Pulses Present] : the pedal pulses are present [No Edema] : there was no peripheral edema [No Palpable Aorta] : no palpable aorta [No Extremity Clubbing/Cyanosis] : no extremity clubbing/cyanosis [Soft] : abdomen soft [Non Tender] : non-tender [Non-distended] : non-distended [No Masses] : no abdominal mass palpated [No HSM] : no HSM [Normal Bowel Sounds] : normal bowel sounds [Normal Posterior Cervical Nodes] : no posterior cervical lymphadenopathy [Normal Anterior Cervical Nodes] : no anterior cervical lymphadenopathy [No CVA Tenderness] : no CVA  tenderness [No Spinal Tenderness] : no spinal tenderness [No Joint Swelling] : no joint swelling [Grossly Normal Strength/Tone] : grossly normal strength/tone [No Rash] : no rash [Coordination Grossly Intact] : coordination grossly intact [No Focal Deficits] : no focal deficits [Normal Gait] : normal gait [Deep Tendon Reflexes (DTR)] : deep tendon reflexes were 2+ and symmetric [Normal Affect] : the affect was normal [Normal Insight/Judgement] : insight and judgment were intact [de-identified] : voice raspy, sl congested-sounding [de-identified] : conjunctiva red bilat without discharge

## 2023-05-03 LAB
RAPID RVP RESULT: NOT DETECTED
SARS-COV-2 RNA PNL RESP NAA+PROBE: NOT DETECTED

## 2023-05-09 NOTE — PROGRESS NOTE ADULT - PROBLEM SELECTOR PROBLEM 3
Atrial flutter, unspecified type Doxepin Counseling:  Patient advised that the medication is sedating and not to drive a car after taking this medication. Patient informed of potential adverse effects including but not limited to dry mouth, urinary retention, and blurry vision.  The patient verbalized understanding of the proper use and possible adverse effects of doxepin.  All of the patient's questions and concerns were addressed.

## 2023-05-23 NOTE — H&P ADULT - CARDIOVASCULAR DETAILS
Apixaban/Eliquis - Compliance/Apixaban/Eliquis - Dietary Advice/Apixaban/Eliquis - Follow up monitoring/Apixaban/Eliquis - Potential for adverse drug reactions and interactions irregular rate and rhythm/positive S2/A. flutter. Rate 140's/tachycardia/positive S1

## 2023-06-13 ENCOUNTER — APPOINTMENT (OUTPATIENT)
Dept: INTERNAL MEDICINE | Facility: CLINIC | Age: 68
End: 2023-06-13
Payer: COMMERCIAL

## 2023-06-13 VITALS
TEMPERATURE: 97.3 F | HEIGHT: 67 IN | OXYGEN SATURATION: 94 % | HEART RATE: 89 BPM | DIASTOLIC BLOOD PRESSURE: 79 MMHG | SYSTOLIC BLOOD PRESSURE: 148 MMHG | WEIGHT: 207 LBS | BODY MASS INDEX: 32.49 KG/M2

## 2023-06-13 DIAGNOSIS — Z23 ENCOUNTER FOR IMMUNIZATION: ICD-10-CM

## 2023-06-13 DIAGNOSIS — I10 ESSENTIAL (PRIMARY) HYPERTENSION: ICD-10-CM

## 2023-06-13 DIAGNOSIS — Z87.898 PERSONAL HISTORY OF OTHER SPECIFIED CONDITIONS: ICD-10-CM

## 2023-06-13 DIAGNOSIS — J06.9 ACUTE UPPER RESPIRATORY INFECTION, UNSPECIFIED: ICD-10-CM

## 2023-06-13 DIAGNOSIS — L97.529 NON-PRESSURE CHRONIC ULCER OF OTHER PART OF LEFT FOOT WITH UNSPECIFIED SEVERITY: ICD-10-CM

## 2023-06-13 PROCEDURE — 99214 OFFICE O/P EST MOD 30 MIN: CPT | Mod: 25

## 2023-06-13 PROCEDURE — 90677 PCV20 VACCINE IM: CPT

## 2023-06-13 PROCEDURE — G0009: CPT

## 2023-06-13 RX ORDER — FLUTICASONE PROPIONATE 50 UG/1
50 SPRAY, METERED NASAL TWICE DAILY
Qty: 1 | Refills: 1 | Status: ACTIVE | COMMUNITY
Start: 2023-06-13 | End: 1900-01-01

## 2023-06-13 NOTE — ASSESSMENT
[FreeTextEntry1] : HCM:\par - Lipids\par - PCV20 today\par - FIT test reminder\par - Will need to discuss tdap and Shingrix\par \par RTC 2-3 months for follow-up of multiple med complaints

## 2023-06-13 NOTE — HISTORY OF PRESENT ILLNESS
[de-identified] : Javier Washington is a 67yo M with PMhx of lymphedema, HTN, and Afib who presents for BP and HLD follow-up. \par \par Arrived 30 min for a 20min appointment, accommodated\par \par HTN:\par Checks BP at home occasionally, ranges from 120-130s SBP. Occasionally 140 SBP.\par \par Afib:\par Reports HR always 80s, wonder if he needs to be on Eliquis. Only taking eliquis once a day?????? Reports he was supposed to have a month-long monitor w/ the cardiologist but the appartus got lost in the mail?\par \par Requesting referral for PT\par

## 2023-06-13 NOTE — PHYSICAL EXAM
[No Acute Distress] : no acute distress [Well Developed] : well developed [Well-Appearing] : well-appearing [Normal Outer Ear/Nose] : the outer ears and nose were normal in appearance [No Respiratory Distress] : no respiratory distress  [No Accessory Muscle Use] : no accessory muscle use [Clear to Auscultation] : lungs were clear to auscultation bilaterally [Normal Rate] : normal rate  [Regular Rhythm] : with a regular rhythm [Normal S1, S2] : normal S1 and S2 [No Murmur] : no murmur heard [Grossly Normal Strength/Tone] : grossly normal strength/tone [Normal Affect] : the affect was normal [Normal Insight/Judgement] : insight and judgment were intact [de-identified] : Cerumen in the left ear, right TM normal [de-identified] : +1 pitting edema bialterally [de-identified] : No foot ulcers appreciated [de-identified] : Answering questions appropriately. Gets on exam table and ambulates without assistance

## 2023-06-14 LAB
CHOLEST SERPL-MCNC: 172 MG/DL
HDLC SERPL-MCNC: 43 MG/DL
LDLC SERPL CALC-MCNC: 109 MG/DL
NONHDLC SERPL-MCNC: 129 MG/DL
TRIGL SERPL-MCNC: 102 MG/DL

## 2023-08-22 ENCOUNTER — OUTPATIENT (OUTPATIENT)
Dept: OUTPATIENT SERVICES | Facility: HOSPITAL | Age: 68
LOS: 1 days | End: 2023-08-22
Payer: COMMERCIAL

## 2023-08-22 ENCOUNTER — APPOINTMENT (OUTPATIENT)
Age: 68
End: 2023-08-22
Payer: COMMERCIAL

## 2023-08-22 VITALS
BODY MASS INDEX: 32.02 KG/M2 | HEIGHT: 67 IN | OXYGEN SATURATION: 96 % | WEIGHT: 204 LBS | DIASTOLIC BLOOD PRESSURE: 80 MMHG | SYSTOLIC BLOOD PRESSURE: 130 MMHG | HEART RATE: 77 BPM

## 2023-08-22 DIAGNOSIS — R93.89 ABNORMAL FINDINGS ON DIAGNOSTIC IMAGING OF OTHER SPECIFIED BODY STRUCTURES: ICD-10-CM

## 2023-08-22 DIAGNOSIS — I10 ESSENTIAL (PRIMARY) HYPERTENSION: ICD-10-CM

## 2023-08-22 DIAGNOSIS — E78.2 MIXED HYPERLIPIDEMIA: ICD-10-CM

## 2023-08-22 DIAGNOSIS — I48.0 PAROXYSMAL ATRIAL FIBRILLATION: ICD-10-CM

## 2023-08-22 PROCEDURE — 99212 OFFICE O/P EST SF 10 MIN: CPT

## 2023-08-22 PROCEDURE — G0463: CPT

## 2023-08-22 NOTE — HISTORY OF PRESENT ILLNESS
[de-identified] : Javier Washington is a 67yo M with PMhx of lymphedema, HTN, and Afib who presents for BP and HLD follow-up.   Arrived 30 min late for a 20min appointment, accommodated -140s at home. Still taking eliquis.  Trying to watch his diet. Able to tell me he is supposed to get CT chest, see his cardiologist for holter, and submit FOBT, however he hasn't done anything due to being busy at work

## 2023-08-22 NOTE — PHYSICAL EXAM
[No Acute Distress] : no acute distress [Well Developed] : well developed [Well-Appearing] : well-appearing [Normal Outer Ear/Nose] : the outer ears and nose were normal in appearance [No Respiratory Distress] : no respiratory distress  [No Accessory Muscle Use] : no accessory muscle use [Clear to Auscultation] : lungs were clear to auscultation bilaterally [Normal Rate] : normal rate  [Regular Rhythm] : with a regular rhythm [Normal S1, S2] : normal S1 and S2 [No Murmur] : no murmur heard [Grossly Normal Strength/Tone] : grossly normal strength/tone [Normal Affect] : the affect was normal [Normal Insight/Judgement] : insight and judgment were intact [de-identified] : Answering questions appropriately. Gets on exam table and ambulates without assistance [de-identified] : +1 pitting edema bialterally

## 2023-08-25 DIAGNOSIS — E78.2 MIXED HYPERLIPIDEMIA: ICD-10-CM

## 2023-08-25 DIAGNOSIS — I48.0 PAROXYSMAL ATRIAL FIBRILLATION: ICD-10-CM

## 2023-08-25 DIAGNOSIS — R93.89 ABNORMAL FINDINGS ON DIAGNOSTIC IMAGING OF OTHER SPECIFIED BODY STRUCTURES: ICD-10-CM

## 2023-09-18 ENCOUNTER — RX CHANGE (OUTPATIENT)
Age: 68
End: 2023-09-18

## 2023-09-18 RX ORDER — DILTIAZEM HYDROCHLORIDE 180 MG/1
180 CAPSULE, EXTENDED RELEASE ORAL
Qty: 90 | Refills: 3 | Status: ACTIVE | COMMUNITY
Start: 2023-09-18 | End: 1900-01-01

## 2023-09-18 RX ORDER — DILTIAZEM HYDROCHLORIDE 180 MG/1
180 CAPSULE, EXTENDED RELEASE ORAL
Qty: 30 | Refills: 5 | Status: DISCONTINUED | COMMUNITY
Start: 2022-10-20 | End: 2023-09-18

## 2024-01-24 ENCOUNTER — APPOINTMENT (OUTPATIENT)
Dept: INTERNAL MEDICINE | Facility: CLINIC | Age: 69
End: 2024-01-24

## 2024-02-20 ENCOUNTER — APPOINTMENT (OUTPATIENT)
Dept: INTERNAL MEDICINE | Facility: CLINIC | Age: 69
End: 2024-02-20

## 2024-03-22 NOTE — HEALTH RISK ASSESSMENT
[de-identified] : TSA, lots of agents [de-identified] : 2 meals, fresh fruits. Little fried foods [[id number]] : 0 [Sexually Active] : not sexually active [Reports changes in vision] : Reports no changes in vision [Reports changes in hearing] : Reports no changes in hearing [FreeTextEntry2] : TSA [Reports changes in dental health] : Reports no changes in dental health

## 2024-03-22 NOTE — REVIEW OF SYSTEMS
[Fever] : no fever [Chest Pain] : no chest pain [Chills] : no chills [Shortness Of Breath] : no shortness of breath [Constipation] : no constipation [Diarrhea] : no diarrhea [Headache] : no headache [Dizziness] : no dizziness

## 2024-03-22 NOTE — HISTORY OF PRESENT ILLNESS
[de-identified] : Mr. CHICO WICK is a 69 year old White  male  with history of   lymphedema and Afib, HTN, dyslipidemia, preDM, shoulder pain presented today for comprehensive evaluation. Last seen by Dr. Carter 8/22/23. ----- Abnormal chest CT (793.2) (R93.89) A Was recommended repeat CT following his hospitalization 1 year ago.     - Repeat CT chest Mixed hyperlipidemia (272.2) (E78.2) Discussed findings of high cholesterol and possible need for statin. Has really been     working on diet, cutting cheese/meat, etc.     - Will check next visit (6 months total from last check) AF (paroxysmal atrial fibrillation) (427.31) (I48.0) Reinforced adherence to Eliquis BID and follow-up with cardiology. Reviewed stroke risk     of Afib. RTC 3-4 months for CPE. ----- Admitted 10/11-10/20 with MSSA bacteremia. Course complicated by Afib, TTE normal. Discharged w/ Ancef through 11/12/22 and eliquis.  Patient needs follow up imaging (CXR) for positive psittacosus IgG, per ID. Since discharge, he reports feeling well. Eating normal.   HTN: 120-130 at home per patient report. Did not take his dilt today.  a1c of 5.8%  Meds: Dilt-ER 180mg Eliquis 5mg BID Vit E 400mg Vit B complex Emerg-C Probiotics  Father: Emphysema Mother: COPD [FreeTextEntry1] : CPE

## 2024-03-22 NOTE — ASSESSMENT
[FreeTextEntry1] : Mr. CHICO WICK is a 69 year old White  male  with history of   lymphedema and Afib, HTN, dyslipidemia, preDM, shoulder pain presented today for comprehensive evaluation.  # HCM: - CXR in 2 months. - Discussed colonoscopy v. FIT test. Colonoscopy benefits include immediate polyp removal and not having to re-test for 10yrs v. convenience of yearly stool test. Discussed w/ pt if stool test is positive that they will have to get a colonoscopy anyways. Opted for FIT test - Will need lipids, defer currently. Will obtain with repeat labs in 2 months. - COVID vaccinated - PCV20 deferred in the setting of active PNA treatment - Shingrix recommended, advised to obtain after abx complete.  RTC 2 months for f/u

## 2024-03-22 NOTE — PHYSICAL EXAM
[de-identified] : IMpacted cerumen left ear, right ear normal [de-identified] : +1 pitting edema bilaterally [de-identified] : ?rales in the right lower lobe [de-identified] : No facial asymmetry. Strength equal in the upper and lower extremities. Finger-to-nose normal, heel-to-shin normal [de-identified] : No ulcer appreciated on bottom of left foot

## 2024-03-26 ENCOUNTER — APPOINTMENT (OUTPATIENT)
Dept: INTERNAL MEDICINE | Facility: CLINIC | Age: 69
End: 2024-03-26

## 2024-03-26 ENCOUNTER — NON-APPOINTMENT (OUTPATIENT)
Age: 69
End: 2024-03-26

## 2024-06-25 ENCOUNTER — RX RENEWAL (OUTPATIENT)
Age: 69
End: 2024-06-25

## 2024-06-25 RX ORDER — APIXABAN 5 MG/1
5 TABLET, FILM COATED ORAL
Qty: 60 | Refills: 5 | Status: ACTIVE | COMMUNITY
Start: 2022-10-20 | End: 1900-01-01

## 2024-09-13 ENCOUNTER — RX RENEWAL (OUTPATIENT)
Age: 69
End: 2024-09-13

## 2025-04-28 NOTE — ED CDU PROVIDER INITIAL DAY NOTE - FAMILY HISTORY
Yes Yes Mother  Still living? Unknown  Family history of COPD (chronic obstructive pulmonary disease), Age at diagnosis: Age Unknown

## 2025-06-26 ENCOUNTER — RX RENEWAL (OUTPATIENT)
Age: 70
End: 2025-06-26

## 2025-06-27 ENCOUNTER — NON-APPOINTMENT (OUTPATIENT)
Age: 70
End: 2025-06-27

## 2025-06-27 ENCOUNTER — APPOINTMENT (OUTPATIENT)
Dept: ELECTROPHYSIOLOGY | Facility: CLINIC | Age: 70
End: 2025-06-27
Payer: COMMERCIAL

## 2025-06-27 VITALS
TEMPERATURE: 97.9 F | WEIGHT: 193.13 LBS | RESPIRATION RATE: 15 BRPM | BODY MASS INDEX: 30.31 KG/M2 | DIASTOLIC BLOOD PRESSURE: 76 MMHG | OXYGEN SATURATION: 96 % | HEART RATE: 68 BPM | SYSTOLIC BLOOD PRESSURE: 153 MMHG | HEIGHT: 67 IN

## 2025-06-27 VITALS — DIASTOLIC BLOOD PRESSURE: 72 MMHG | SYSTOLIC BLOOD PRESSURE: 146 MMHG

## 2025-06-27 PROCEDURE — G2211 COMPLEX E/M VISIT ADD ON: CPT | Mod: NC

## 2025-06-27 PROCEDURE — 99214 OFFICE O/P EST MOD 30 MIN: CPT

## 2025-06-27 PROCEDURE — 93000 ELECTROCARDIOGRAM COMPLETE: CPT
